# Patient Record
Sex: MALE | Race: WHITE | NOT HISPANIC OR LATINO | Employment: OTHER | URBAN - METROPOLITAN AREA
[De-identification: names, ages, dates, MRNs, and addresses within clinical notes are randomized per-mention and may not be internally consistent; named-entity substitution may affect disease eponyms.]

---

## 2019-02-04 ENCOUNTER — HOSPITAL ENCOUNTER (OUTPATIENT)
Dept: NUCLEAR MEDICINE | Facility: HOSPITAL | Age: 79
Discharge: HOME | End: 2019-02-04
Attending: NURSE PRACTITIONER | Admitting: NURSE PRACTITIONER

## 2019-04-15 ENCOUNTER — CONVERSION ENCOUNTER (OUTPATIENT)
Dept: FAMILY MEDICINE CLINIC | Facility: CLINIC | Age: 79
End: 2019-04-15

## 2019-04-25 ENCOUNTER — HOSPITAL ENCOUNTER (OUTPATIENT)
Dept: LAB | Facility: HOSPITAL | Age: 79
Discharge: HOME | End: 2019-04-25
Attending: INTERNAL MEDICINE | Admitting: INTERNAL MEDICINE

## 2019-04-25 LAB
ANION GAP SERPL CALC-SCNC: 14.6 MMOL/L (ref 10–20)
BASOPHILS # BLD AUTO: 0 10*3/UL (ref 0–0.2)
BASOPHILS NFR BLD AUTO: 1 % (ref 0–2)
BUN SERPL-MCNC: 20 MG/DL (ref 8–20)
BUN/CREAT SERPL: 15.4 (ref 6.2–20.3)
CALCIUM SERPL-MCNC: 9.1 MG/DL (ref 8.9–10.3)
CHLORIDE SERPL-SCNC: 103 MMOL/L (ref 101–111)
CONV CO2: 23 MMOL/L (ref 22–32)
CREAT UR-MCNC: 1.3 MG/DL (ref 0.7–1.2)
DIFFERENTIAL METHOD BLD: (no result)
EOSINOPHIL # BLD AUTO: 0.2 10*3/UL (ref 0–0.3)
EOSINOPHIL # BLD AUTO: 4 % (ref 0–3)
ERYTHROCYTE [DISTWIDTH] IN BLOOD BY AUTOMATED COUNT: 14 % (ref 11.5–14.5)
GLUCOSE SERPL-MCNC: 98 MG/DL (ref 65–99)
HCT VFR BLD AUTO: 42.3 % (ref 40–54)
HGB BLD-MCNC: 14.2 G/DL (ref 14–18)
INR PPP: 1
LYMPHOCYTES # BLD AUTO: 1.7 10*3/UL (ref 0.8–4.8)
LYMPHOCYTES NFR BLD AUTO: 27 % (ref 18–42)
MCH RBC QN AUTO: 30.6 PG (ref 26–32)
MCHC RBC AUTO-ENTMCNC: 33.6 G/DL (ref 32–36)
MCV RBC AUTO: 91.1 FL (ref 80–94)
MONOCYTES # BLD AUTO: 0.7 10*3/UL (ref 0.1–1.3)
MONOCYTES NFR BLD AUTO: 11 % (ref 2–11)
NEUTROPHILS # BLD AUTO: 3.8 10*3/UL (ref 2.3–8.6)
NEUTROPHILS NFR BLD AUTO: 57 % (ref 50–75)
NRBC BLD AUTO-RTO: 0 /100{WBCS}
NRBC/RBC NFR BLD MANUAL: 0 10*3/UL
PLATELET # BLD AUTO: 299 10*3/UL (ref 150–450)
PMV BLD AUTO: 8 FL (ref 7.4–10.4)
POTASSIUM SERPL-SCNC: 4.6 MMOL/L (ref 3.6–5.1)
PROTHROMBIN TIME: 10.3 SEC (ref 9.6–11.7)
RBC # BLD AUTO: 4.64 10*6/UL (ref 4.6–6)
SODIUM SERPL-SCNC: 136 MMOL/L (ref 136–144)
WBC # BLD AUTO: 6.5 10*3/UL (ref 4.5–11.5)

## 2019-06-04 VITALS
HEIGHT: 71 IN | HEART RATE: 61 BPM | BODY MASS INDEX: 29.15 KG/M2 | DIASTOLIC BLOOD PRESSURE: 68 MMHG | SYSTOLIC BLOOD PRESSURE: 119 MMHG | RESPIRATION RATE: 18 BRPM | WEIGHT: 208.2 LBS | OXYGEN SATURATION: 97 %

## 2019-07-15 ENCOUNTER — OFFICE VISIT (OUTPATIENT)
Dept: CARDIOLOGY | Facility: CLINIC | Age: 79
End: 2019-07-15

## 2019-07-15 VITALS
SYSTOLIC BLOOD PRESSURE: 115 MMHG | WEIGHT: 210 LBS | DIASTOLIC BLOOD PRESSURE: 70 MMHG | HEART RATE: 75 BPM | RESPIRATION RATE: 18 BRPM | BODY MASS INDEX: 29.4 KG/M2 | HEIGHT: 71 IN

## 2019-07-15 DIAGNOSIS — E78.5 HYPERLIPIDEMIA LDL GOAL <70: ICD-10-CM

## 2019-07-15 DIAGNOSIS — I25.118 CORONARY ARTERY DISEASE OF NATIVE ARTERY OF NATIVE HEART WITH STABLE ANGINA PECTORIS (HCC): Primary | ICD-10-CM

## 2019-07-15 DIAGNOSIS — I10 ESSENTIAL HYPERTENSION: ICD-10-CM

## 2019-07-15 PROCEDURE — 99214 OFFICE O/P EST MOD 30 MIN: CPT | Performed by: INTERNAL MEDICINE

## 2019-07-15 RX ORDER — RANOLAZINE 500 MG/1
500 TABLET, EXTENDED RELEASE ORAL 2 TIMES DAILY
COMMUNITY
End: 2019-07-15 | Stop reason: SDUPTHER

## 2019-07-15 RX ORDER — ISOSORBIDE MONONITRATE 60 MG/1
30 TABLET, EXTENDED RELEASE ORAL DAILY
COMMUNITY
End: 2023-03-10 | Stop reason: HOSPADM

## 2019-07-15 RX ORDER — METOPROLOL TARTRATE 100 MG/1
50 TABLET ORAL 2 TIMES DAILY
COMMUNITY

## 2019-07-15 RX ORDER — FENOFIBRATE 54 MG/1
54 TABLET ORAL DAILY
COMMUNITY
End: 2020-01-16

## 2019-07-15 RX ORDER — ATORVASTATIN CALCIUM 20 MG/1
20 TABLET, FILM COATED ORAL DAILY
COMMUNITY
End: 2020-01-16

## 2019-07-15 RX ORDER — AMLODIPINE BESYLATE 2.5 MG/1
2.5 TABLET ORAL NIGHTLY
Status: ON HOLD | COMMUNITY
End: 2022-01-27

## 2019-07-15 RX ORDER — RANOLAZINE 500 MG/1
500 TABLET, EXTENDED RELEASE ORAL 2 TIMES DAILY
Qty: 180 TABLET | Refills: 3 | Status: SHIPPED | OUTPATIENT
Start: 2019-07-15 | End: 2020-01-16

## 2019-07-15 NOTE — PROGRESS NOTES
Subjective:     Encounter Date:07/15/2019      Patient ID: Darrick Andrade is a 79 y.o. male.    Chief Complaint:      It is my pleasure seeing patient Darrick Andrade  in the office today.  is a pleasant 79-year-old male patient with past medical history that is significant for history of  hypertension hyperlipidemia, currently on maximal medical therapy including aspirin   statin beta-blocker and Imdur continued to have recurrent episodes of chest discomfort of the new suspicious for anginal equivalent.      he was tried on medical therapy including beta-blocker and isosorbide with the continued symptoms      echocardiogram with normal LV systolic function  Patient cardiac catheterization showed severe 2 vessel coronary artery disease involving LAD and ostium of the right coronary artery   Likely LAD is a culprit lesion for patient's symptoms of angina   Lad lesion is somewhat risky in terms of intervention plan to treat him medically at this time   patient likes to try conservative therapy at this time   continue Ranexa and close monitoring  If the patient continues to be symptomatic will consider high-risk PCI to the LAD   need for close monitoring and follow-up discussed with the patient   cath films reviewed with the patient  Patient denies any further symptoms of chest discomfort with Ranexa  Continue Ranexa and maximal medical therapy for now  Follow-up with primary care physician for labs   follow-up in office in 6 months        The following portions of the patient's history were reviewed and updated as appropriate: allergies, current medications, past family history, past medical history, past social history, past surgical history and problem list.    No Known Allergies      Current Outpatient Medications:   •  amLODIPine (NORVASC) 2.5 MG tablet, Take 2.5 mg by mouth Daily., Disp: , Rfl:   •  atorvastatin (LIPITOR) 20 MG tablet, Take 20 mg by mouth Daily., Disp: , Rfl:   •  fenofibrate (TRICOR) 54  MG tablet, Take 54 mg by mouth Daily., Disp: , Rfl:   •  isosorbide mononitrate (IMDUR) 60 MG 24 hr tablet, Take 60 mg by mouth Daily., Disp: , Rfl:   •  metoprolol tartrate (LOPRESSOR) 100 MG tablet, Take 100 mg by mouth 2 (Two) Times a Day., Disp: , Rfl:   •  ranolazine (RANEXA) 500 MG 12 hr tablet, Take 500 mg by mouth 2 (Two) Times a Day., Disp: , Rfl:     Family History   Problem Relation Age of Onset   • Hyperlipidemia Mother    • Hyperlipidemia Father    • Brain cancer Father        Past Medical History:   Diagnosis Date   • Glaucoma    • Hyperlipidemia    • Hypertension    • Skin cancer        Past Surgical History:   Procedure Laterality Date   • CARDIAC CATHETERIZATION  04/26/2019    No stents placed   • HERNIA REPAIR     • OTHER SURGICAL HISTORY      lump on back of head removed   • TOE SURGERY         Social History     Socioeconomic History   • Marital status:      Spouse name: Not on file   • Number of children: Not on file   • Years of education: Not on file   • Highest education level: Not on file   Tobacco Use   • Smoking status: Former Smoker   • Smokeless tobacco: Never Used   Substance and Sexual Activity   • Alcohol use: No     Frequency: Never   • Drug use: No       Review of Systems   Constitution: Negative for chills, decreased appetite and malaise/fatigue.   HENT: Negative for congestion.    Eyes: Negative for blurred vision and double vision.   Cardiovascular: Negative for chest pain, dyspnea on exertion, leg swelling, near-syncope, orthopnea, palpitations and syncope.   Respiratory: Negative for cough and shortness of breath.    Hematologic/Lymphatic: Negative for adenopathy. Does not bruise/bleed easily.   Skin: Negative for rash.   Gastrointestinal: Negative for bloating and abdominal pain.   Neurological: Negative for dizziness and focal weakness.            Objective:         Vitals:    07/15/19 1412   BP: 115/70   Pulse: 75   Resp: 18       Physical Exam   Constitutional: He is  oriented to person, place, and time. He appears well-developed and well-nourished.   HENT:   Head: Normocephalic and atraumatic.   Eyes: Conjunctivae are normal. Pupils are equal, round, and reactive to light.   Neck: Normal range of motion. Neck supple. No thyromegaly present.   Cardiovascular: Normal rate, regular rhythm, S1 normal, S2 normal and intact distal pulses.   Pulmonary/Chest: Effort normal and breath sounds normal.   Abdominal: Soft. Bowel sounds are normal.   Musculoskeletal: He exhibits no edema.   Neurological: He is alert and oriented to person, place, and time.   Skin: Skin is warm.   Nursing note and vitals reviewed.

## 2019-07-26 ENCOUNTER — TELEPHONE (OUTPATIENT)
Dept: CARDIOLOGY | Facility: CLINIC | Age: 79
End: 2019-07-26

## 2019-07-26 RX ORDER — RANOLAZINE 500 MG/1
500 TABLET, EXTENDED RELEASE ORAL 2 TIMES DAILY
Qty: 60 TABLET | Refills: 6 | Status: SHIPPED | OUTPATIENT
Start: 2019-07-26 | End: 2020-07-13

## 2020-01-16 ENCOUNTER — OFFICE VISIT (OUTPATIENT)
Dept: CARDIOLOGY | Facility: CLINIC | Age: 80
End: 2020-01-16

## 2020-01-16 VITALS
BODY MASS INDEX: 29.06 KG/M2 | DIASTOLIC BLOOD PRESSURE: 67 MMHG | RESPIRATION RATE: 18 BRPM | SYSTOLIC BLOOD PRESSURE: 127 MMHG | HEART RATE: 66 BPM | HEIGHT: 71 IN | WEIGHT: 207.6 LBS

## 2020-01-16 DIAGNOSIS — E78.2 MIXED HYPERLIPIDEMIA: ICD-10-CM

## 2020-01-16 DIAGNOSIS — I10 ESSENTIAL HYPERTENSION: ICD-10-CM

## 2020-01-16 DIAGNOSIS — I25.118 CORONARY ARTERY DISEASE OF NATIVE ARTERY OF NATIVE HEART WITH STABLE ANGINA PECTORIS (HCC): ICD-10-CM

## 2020-01-16 DIAGNOSIS — I20.9 ANGINA PECTORIS (HCC): Primary | ICD-10-CM

## 2020-01-16 PROCEDURE — 93000 ELECTROCARDIOGRAM COMPLETE: CPT | Performed by: INTERNAL MEDICINE

## 2020-01-16 PROCEDURE — 99214 OFFICE O/P EST MOD 30 MIN: CPT | Performed by: INTERNAL MEDICINE

## 2020-01-16 RX ORDER — MOXIFLOXACIN 5 MG/ML
SOLUTION/ DROPS OPHTHALMIC
COMMUNITY
Start: 2020-01-15 | End: 2020-05-09

## 2020-01-16 RX ORDER — LEVOTHYROXINE SODIUM 0.05 MG/1
50 TABLET ORAL DAILY
COMMUNITY

## 2020-01-16 RX ORDER — ASPIRIN 325 MG
325 TABLET ORAL DAILY
COMMUNITY
End: 2020-05-10 | Stop reason: HOSPADM

## 2020-01-16 RX ORDER — ROSUVASTATIN CALCIUM 20 MG/1
20 TABLET, COATED ORAL NIGHTLY
COMMUNITY
Start: 2020-01-14

## 2020-01-16 RX ORDER — OLMESARTAN MEDOXOMIL 40 MG/1
40 TABLET ORAL DAILY
COMMUNITY
Start: 2020-01-05 | End: 2020-05-09

## 2020-01-16 NOTE — PROGRESS NOTES
Cardiology Office Visit      Encounter Date:  01/16/2020    Patient ID:   Darrick Andrade is a 79 y.o. male.    Reason For Followup:  Coronary artery disease  Stable angina  Hypertension  Hyperlipidemia    Brief Clinical History:  Dear Emily Etienne APRN    I had the pleasure of seeing Darrick Andrade today. As you are well aware, this is a 79 y.o. male with past medical history that is significant for history of  hypertension hyperlipidemia, currently on maximal medical therapy including aspirin statin beta-blocker and Imdur continued to have recurrent episodes of chest discomfort of the new suspicious for anginal equivalent.     Echocardiogram with normal LV systolic function  Patient cardiac catheterization showed severe 2 vessel coronary artery disease involving LAD and ostium of the right coronary artery/ Likely LAD is a culprit lesion for patient's symptoms of angina    Interval History:  Patient denies any further symptoms of chest discomfort  Able to exercise without any significant limitation    Assessment & Plan    Impressions:  Coronary artery disease  Stable angina  Hypertension  Hyperlipidemia    Recommendations:  Lad lesion is somewhat risky in terms of intervention plan to treat him medically at this time   patient likes to try conservative therapy at this time   continue Ranexa and close monitoring  If the patient continues to be symptomatic will consider high-risk PCI to the LAD   need for close monitoring and follow-up discussed with the patient   cath films reviewed with the patient  Patient denies any further symptoms of chest discomfort with Ranexa  Continue Ranexa and maximal medical therapy for now  Patient brought labs with the LDL still around 100 recent increase in the dose of statin  Follow-up with primary care physician for labs  Follow-up in office in 6 months    Objective:    Vitals:  Vitals:    01/16/20 1320   BP: 127/67   BP Location: Left arm   Pulse: 66   Resp: 18  "  Weight: 94.2 kg (207 lb 9.6 oz)   Height: 179.1 cm (70.5\")       Physical Exam:    General: Alert, cooperative, no distress, appears stated age  Head:  Normocephalic, atraumatic, mucous membranes moist  Eyes:  Conjunctiva/corneas clear, EOM's intact     Neck:  Supple,  no adenopathy;      Lungs: Clear to auscultation bilaterally, no wheezes rhonchi rales are noted  Chest wall: No tenderness  Heart::  Regular rate and rhythm, S1 and S2 normal, no murmur, rub or gallop  Abdomen: Soft, non-tender, nondistended bowel sounds active  Extremities: No cyanosis, clubbing, or edema  Pulses: 2+ and symmetric all extremities  Skin:  No rashes or lesions  Neuro/psych: A&O x3. CN II through XII are grossly intact with appropriate affect      Allergies:  No Known Allergies    Medication Review:     Current Outpatient Medications:   •  amLODIPine (NORVASC) 2.5 MG tablet, Take 2.5 mg by mouth Daily., Disp: , Rfl:   •  aspirin 325 MG tablet, Take 325 mg by mouth Daily., Disp: , Rfl:   •  DULoxetine (CYMBALTA) 30 MG capsule, Take 30 mg by mouth Daily., Disp: , Rfl:   •  isosorbide mononitrate (IMDUR) 60 MG 24 hr tablet, Take 60 mg by mouth Daily., Disp: , Rfl:   •  levothyroxine (SYNTHROID, LEVOTHROID) 50 MCG tablet, Take 50 mcg by mouth Daily., Disp: , Rfl:   •  metoprolol tartrate (LOPRESSOR) 100 MG tablet, Take 100 mg by mouth 2 (Two) Times a Day., Disp: , Rfl:   •  moxifloxacin (VIGAMOX) 0.5 % ophthalmic solution, , Disp: , Rfl:   •  olmesartan (BENICAR) 40 MG tablet, Take 40 mg by mouth Daily. 1/2 tablet po at night, Disp: , Rfl:   •  ranolazine (RANEXA) 500 MG 12 hr tablet, Take 1 tablet by mouth 2 (Two) Times a Day., Disp: 60 tablet, Rfl: 6  •  rosuvastatin (CRESTOR) 20 MG tablet, Take 20 mg by mouth Every Night., Disp: , Rfl:     Family History:  Family History   Problem Relation Age of Onset   • Hyperlipidemia Mother    • Hyperlipidemia Father    • Brain cancer Father        Past Medical History:  Past Medical History: "   Diagnosis Date   • Coronary artery disease of native artery of native heart with stable angina pectoris (CMS/[facility]) 7/15/2019   • Essential hypertension 7/15/2019   • Glaucoma    • Hyperlipidemia    • Hyperlipidemia LDL goal <70 7/15/2019   • Hypertension    • Hypothyroidism    • Skin cancer        Past surgical History:  Past Surgical History:   Procedure Laterality Date   • CARDIAC CATHETERIZATION  04/26/2019    No stents placed   • HERNIA REPAIR     • OTHER SURGICAL HISTORY      lump on back of head removed   • TOE SURGERY         Social History:  Social History     Socioeconomic History   • Marital status:      Spouse name: Not on file   • Number of children: Not on file   • Years of education: Not on file   • Highest education level: Not on file   Tobacco Use   • Smoking status: Former Smoker   • Smokeless tobacco: Never Used   Substance and Sexual Activity   • Alcohol use: No     Frequency: Never   • Drug use: No   • Sexual activity: Defer       Review of Systems:  The following systems were reviewed as they relate to the cardiovascular system: Constitutional, Eyes, ENT, Cardiovascular, Respiratory, Gastrointestinal, Integumentary, Neurological, Psychiatric, Hematologic, Endocrine, Musculoskeletal, and Genitourinary. The pertinent cardiovascular findings are reported above with all other cardiovascular points within those systems being negative.    Diagnostic Study Review:     Current Electrocardiogram:    ECG 12 Lead  Date/Time: 1/16/2020 5:27 PM  Performed by: Hien Parisi MD  Authorized by: Hien Parisi MD   Comparison: compared with previous ECG   Similar to previous ECG  Rhythm: sinus rhythm  Rate: normal  BPM: 66  Conduction: conduction normal  T inversion: III and aVF  QRS axis: normal  Other findings: non-specific ST-T wave changes and left ventricular hypertrophy    Clinical impression: abnormal EKG              NOTE: The following portions of the patient's history were  reviewed and updated this visit as appropriate: allergies, current medications, past family history, past medical history, past social history, past surgical history and problem list.

## 2020-05-09 ENCOUNTER — APPOINTMENT (OUTPATIENT)
Dept: CT IMAGING | Facility: HOSPITAL | Age: 80
End: 2020-05-09

## 2020-05-09 ENCOUNTER — HOSPITAL ENCOUNTER (OUTPATIENT)
Facility: HOSPITAL | Age: 80
Discharge: HOME | End: 2020-05-10
Attending: INTERNAL MEDICINE | Admitting: INTERNAL MEDICINE

## 2020-05-09 DIAGNOSIS — R10.30 LOWER ABDOMINAL PAIN: ICD-10-CM

## 2020-05-09 DIAGNOSIS — K92.2 GASTROINTESTINAL HEMORRHAGE, UNSPECIFIED GASTROINTESTINAL HEMORRHAGE TYPE: Primary | ICD-10-CM

## 2020-05-09 DIAGNOSIS — K92.1 GASTROINTESTINAL HEMORRHAGE WITH MELENA: ICD-10-CM

## 2020-05-09 PROBLEM — I20.9 ANGINA PECTORIS: Status: ACTIVE | Noted: 2019-04-15

## 2020-05-09 PROBLEM — E78.5 HYPERLIPIDEMIA LDL GOAL <70: Chronic | Status: ACTIVE | Noted: 2019-07-15

## 2020-05-09 PROBLEM — I25.10 CORONARY ARTERY DISEASE: Status: ACTIVE | Noted: 2019-05-13

## 2020-05-09 PROBLEM — I10 ESSENTIAL HYPERTENSION: Chronic | Status: ACTIVE | Noted: 2019-07-15

## 2020-05-09 PROBLEM — R07.9 CHEST PAIN: Status: ACTIVE | Noted: 2019-04-16

## 2020-05-09 PROBLEM — I20.9 ANGINA PECTORIS: Chronic | Status: ACTIVE | Noted: 2019-04-15

## 2020-05-09 PROBLEM — I25.118 CORONARY ARTERY DISEASE OF NATIVE ARTERY OF NATIVE HEART WITH STABLE ANGINA PECTORIS: Chronic | Status: ACTIVE | Noted: 2019-07-15

## 2020-05-09 LAB
ABO GROUP BLD: NORMAL
ALBUMIN SERPL-MCNC: 4.2 G/DL (ref 3.5–5.2)
ALBUMIN/GLOB SERPL: 1.6 G/DL
ALP SERPL-CCNC: 36 U/L (ref 39–117)
ALT SERPL W P-5'-P-CCNC: 13 U/L (ref 1–41)
ANION GAP SERPL CALCULATED.3IONS-SCNC: 10 MMOL/L (ref 5–15)
AST SERPL-CCNC: 17 U/L (ref 1–40)
BASOPHILS # BLD AUTO: 0 10*3/MM3 (ref 0–0.2)
BASOPHILS NFR BLD AUTO: 0.4 % (ref 0–1.5)
BILIRUB SERPL-MCNC: 0.2 MG/DL (ref 0.2–1.2)
BILIRUB UR QL STRIP: NEGATIVE
BLD GP AB SCN SERPL QL: NEGATIVE
BUN BLD-MCNC: 32 MG/DL (ref 8–23)
BUN/CREAT SERPL: 37.2 (ref 7–25)
CALCIUM SPEC-SCNC: 8.5 MG/DL (ref 8.6–10.5)
CHLORIDE SERPL-SCNC: 100 MMOL/L (ref 98–107)
CLARITY UR: CLEAR
CO2 SERPL-SCNC: 25 MMOL/L (ref 22–29)
COLOR UR: YELLOW
CREAT BLD-MCNC: 0.86 MG/DL (ref 0.76–1.27)
DEPRECATED RDW RBC AUTO: 45.1 FL (ref 37–54)
EOSINOPHIL # BLD AUTO: 0.1 10*3/MM3 (ref 0–0.4)
EOSINOPHIL NFR BLD AUTO: 1.4 % (ref 0.3–6.2)
ERYTHROCYTE [DISTWIDTH] IN BLOOD BY AUTOMATED COUNT: 14 % (ref 12.3–15.4)
GFR SERPL CREATININE-BSD FRML MDRD: 86 ML/MIN/1.73
GLOBULIN UR ELPH-MCNC: 2.7 GM/DL
GLUCOSE BLD-MCNC: 144 MG/DL (ref 65–99)
GLUCOSE UR STRIP-MCNC: NEGATIVE MG/DL
HCT VFR BLD AUTO: 24.7 % (ref 37.5–51)
HGB BLD-MCNC: 8.7 G/DL (ref 13–17.7)
HGB UR QL STRIP.AUTO: NEGATIVE
HOLD SPECIMEN: NORMAL
INR PPP: 1.01 (ref 0.9–1.1)
KETONES UR QL STRIP: NEGATIVE
LEUKOCYTE ESTERASE UR QL STRIP.AUTO: NEGATIVE
LIPASE SERPL-CCNC: 42 U/L (ref 13–60)
LYMPHOCYTES # BLD AUTO: 1.6 10*3/MM3 (ref 0.7–3.1)
LYMPHOCYTES NFR BLD AUTO: 18.9 % (ref 19.6–45.3)
MCH RBC QN AUTO: 32.1 PG (ref 26.6–33)
MCHC RBC AUTO-ENTMCNC: 35.3 G/DL (ref 31.5–35.7)
MCV RBC AUTO: 90.9 FL (ref 79–97)
MONOCYTES # BLD AUTO: 0.7 10*3/MM3 (ref 0.1–0.9)
MONOCYTES NFR BLD AUTO: 7.6 % (ref 5–12)
NEUTROPHILS # BLD AUTO: 6.2 10*3/MM3 (ref 1.7–7)
NEUTROPHILS NFR BLD AUTO: 71.7 % (ref 42.7–76)
NITRITE UR QL STRIP: NEGATIVE
NRBC BLD AUTO-RTO: 0 /100 WBC (ref 0–0.2)
PH UR STRIP.AUTO: 6 [PH] (ref 5–8)
PLATELET # BLD AUTO: 232 10*3/MM3 (ref 140–450)
PMV BLD AUTO: 7.3 FL (ref 6–12)
POTASSIUM BLD-SCNC: 4.2 MMOL/L (ref 3.5–5.2)
PROT SERPL-MCNC: 6.9 G/DL (ref 6–8.5)
PROT UR QL STRIP: NEGATIVE
PROTHROMBIN TIME: 10.6 SECONDS (ref 9.6–11.7)
RBC # BLD AUTO: 2.72 10*6/MM3 (ref 4.14–5.8)
RH BLD: POSITIVE
SODIUM BLD-SCNC: 135 MMOL/L (ref 136–145)
SP GR UR STRIP: 1.02 (ref 1–1.03)
T&S EXPIRATION DATE: NORMAL
UROBILINOGEN UR QL STRIP: NORMAL
WBC NRBC COR # BLD: 8.7 10*3/MM3 (ref 3.4–10.8)
WHOLE BLOOD HOLD SPECIMEN: NORMAL
WHOLE BLOOD HOLD SPECIMEN: NORMAL

## 2020-05-09 PROCEDURE — 0 IOPAMIDOL PER 1 ML: Performed by: PHYSICIAN ASSISTANT

## 2020-05-09 PROCEDURE — G0378 HOSPITAL OBSERVATION PER HR: HCPCS

## 2020-05-09 PROCEDURE — 74177 CT ABD & PELVIS W/CONTRAST: CPT

## 2020-05-09 PROCEDURE — 86900 BLOOD TYPING SEROLOGIC ABO: CPT

## 2020-05-09 PROCEDURE — 96374 THER/PROPH/DIAG INJ IV PUSH: CPT

## 2020-05-09 PROCEDURE — 85025 COMPLETE CBC W/AUTO DIFF WBC: CPT | Performed by: PHYSICIAN ASSISTANT

## 2020-05-09 PROCEDURE — 99284 EMERGENCY DEPT VISIT MOD MDM: CPT

## 2020-05-09 PROCEDURE — 99219 PR INITIAL OBSERVATION CARE/DAY 50 MINUTES: CPT | Performed by: STUDENT IN AN ORGANIZED HEALTH CARE EDUCATION/TRAINING PROGRAM

## 2020-05-09 PROCEDURE — 85610 PROTHROMBIN TIME: CPT | Performed by: PHYSICIAN ASSISTANT

## 2020-05-09 PROCEDURE — 81003 URINALYSIS AUTO W/O SCOPE: CPT | Performed by: PHYSICIAN ASSISTANT

## 2020-05-09 PROCEDURE — 80053 COMPREHEN METABOLIC PANEL: CPT | Performed by: PHYSICIAN ASSISTANT

## 2020-05-09 PROCEDURE — 86850 RBC ANTIBODY SCREEN: CPT | Performed by: PHYSICIAN ASSISTANT

## 2020-05-09 PROCEDURE — 86901 BLOOD TYPING SEROLOGIC RH(D): CPT

## 2020-05-09 PROCEDURE — 86901 BLOOD TYPING SEROLOGIC RH(D): CPT | Performed by: PHYSICIAN ASSISTANT

## 2020-05-09 PROCEDURE — 93005 ELECTROCARDIOGRAM TRACING: CPT | Performed by: PHYSICIAN ASSISTANT

## 2020-05-09 PROCEDURE — 83690 ASSAY OF LIPASE: CPT | Performed by: STUDENT IN AN ORGANIZED HEALTH CARE EDUCATION/TRAINING PROGRAM

## 2020-05-09 PROCEDURE — 96361 HYDRATE IV INFUSION ADD-ON: CPT

## 2020-05-09 PROCEDURE — 86900 BLOOD TYPING SEROLOGIC ABO: CPT | Performed by: PHYSICIAN ASSISTANT

## 2020-05-09 RX ORDER — POTASSIUM CHLORIDE 20 MEQ/1
40 TABLET, EXTENDED RELEASE ORAL AS NEEDED
Status: DISCONTINUED | OUTPATIENT
Start: 2020-05-09 | End: 2020-05-10 | Stop reason: HOSPADM

## 2020-05-09 RX ORDER — ACETAMINOPHEN 325 MG/1
650 TABLET ORAL EVERY 4 HOURS PRN
Status: DISCONTINUED | OUTPATIENT
Start: 2020-05-09 | End: 2020-05-10 | Stop reason: HOSPADM

## 2020-05-09 RX ORDER — MAGNESIUM SULFATE HEPTAHYDRATE 40 MG/ML
2 INJECTION, SOLUTION INTRAVENOUS AS NEEDED
Status: DISCONTINUED | OUTPATIENT
Start: 2020-05-09 | End: 2020-05-10 | Stop reason: HOSPADM

## 2020-05-09 RX ORDER — LOSARTAN POTASSIUM 25 MG/1
12.5 TABLET ORAL NIGHTLY
COMMUNITY
End: 2023-03-10 | Stop reason: HOSPADM

## 2020-05-09 RX ORDER — SODIUM CHLORIDE 0.9 % (FLUSH) 0.9 %
10 SYRINGE (ML) INJECTION AS NEEDED
Status: DISCONTINUED | OUTPATIENT
Start: 2020-05-09 | End: 2020-05-10 | Stop reason: HOSPADM

## 2020-05-09 RX ORDER — PANTOPRAZOLE SODIUM 40 MG/10ML
80 INJECTION, POWDER, LYOPHILIZED, FOR SOLUTION INTRAVENOUS ONCE
Status: COMPLETED | OUTPATIENT
Start: 2020-05-09 | End: 2020-05-09

## 2020-05-09 RX ORDER — CHOLECALCIFEROL (VITAMIN D3) 125 MCG
5 CAPSULE ORAL NIGHTLY PRN
Status: DISCONTINUED | OUTPATIENT
Start: 2020-05-09 | End: 2020-05-10 | Stop reason: HOSPADM

## 2020-05-09 RX ORDER — BISACODYL 10 MG
10 SUPPOSITORY, RECTAL RECTAL DAILY PRN
Status: DISCONTINUED | OUTPATIENT
Start: 2020-05-09 | End: 2020-05-10 | Stop reason: HOSPADM

## 2020-05-09 RX ORDER — ONDANSETRON 4 MG/1
4 TABLET, FILM COATED ORAL EVERY 6 HOURS PRN
Status: DISCONTINUED | OUTPATIENT
Start: 2020-05-09 | End: 2020-05-10 | Stop reason: HOSPADM

## 2020-05-09 RX ORDER — METOPROLOL TARTRATE 50 MG/1
100 TABLET, FILM COATED ORAL 2 TIMES DAILY
Status: DISCONTINUED | OUTPATIENT
Start: 2020-05-09 | End: 2020-05-10 | Stop reason: HOSPADM

## 2020-05-09 RX ORDER — ACETAMINOPHEN 160 MG/5ML
650 SOLUTION ORAL EVERY 4 HOURS PRN
Status: DISCONTINUED | OUTPATIENT
Start: 2020-05-09 | End: 2020-05-10 | Stop reason: HOSPADM

## 2020-05-09 RX ORDER — RANOLAZINE 500 MG/1
500 TABLET, EXTENDED RELEASE ORAL 2 TIMES DAILY
Status: DISCONTINUED | OUTPATIENT
Start: 2020-05-09 | End: 2020-05-10 | Stop reason: HOSPADM

## 2020-05-09 RX ORDER — SODIUM CHLORIDE 0.9 % (FLUSH) 0.9 %
10 SYRINGE (ML) INJECTION EVERY 12 HOURS SCHEDULED
Status: DISCONTINUED | OUTPATIENT
Start: 2020-05-09 | End: 2020-05-10 | Stop reason: HOSPADM

## 2020-05-09 RX ORDER — SODIUM CHLORIDE 9 MG/ML
100 INJECTION, SOLUTION INTRAVENOUS CONTINUOUS
Status: DISCONTINUED | OUTPATIENT
Start: 2020-05-09 | End: 2020-05-10 | Stop reason: HOSPADM

## 2020-05-09 RX ORDER — NITROGLYCERIN 0.4 MG/1
0.4 TABLET SUBLINGUAL
Status: DISCONTINUED | OUTPATIENT
Start: 2020-05-09 | End: 2020-05-10 | Stop reason: HOSPADM

## 2020-05-09 RX ORDER — ONDANSETRON 2 MG/ML
4 INJECTION INTRAMUSCULAR; INTRAVENOUS EVERY 6 HOURS PRN
Status: DISCONTINUED | OUTPATIENT
Start: 2020-05-09 | End: 2020-05-10 | Stop reason: HOSPADM

## 2020-05-09 RX ORDER — MAGNESIUM SULFATE 1 G/100ML
1 INJECTION INTRAVENOUS AS NEEDED
Status: DISCONTINUED | OUTPATIENT
Start: 2020-05-09 | End: 2020-05-10 | Stop reason: HOSPADM

## 2020-05-09 RX ORDER — ISOSORBIDE MONONITRATE 60 MG/1
60 TABLET, EXTENDED RELEASE ORAL DAILY
Status: DISCONTINUED | OUTPATIENT
Start: 2020-05-10 | End: 2020-05-10 | Stop reason: HOSPADM

## 2020-05-09 RX ORDER — ROSUVASTATIN CALCIUM 10 MG/1
20 TABLET, COATED ORAL NIGHTLY
Status: DISCONTINUED | OUTPATIENT
Start: 2020-05-09 | End: 2020-05-10 | Stop reason: HOSPADM

## 2020-05-09 RX ORDER — LOSARTAN POTASSIUM 25 MG/1
12.5 TABLET ORAL NIGHTLY
Status: DISCONTINUED | OUTPATIENT
Start: 2020-05-09 | End: 2020-05-10 | Stop reason: HOSPADM

## 2020-05-09 RX ORDER — POTASSIUM CHLORIDE 1.5 G/1.77G
40 POWDER, FOR SOLUTION ORAL AS NEEDED
Status: DISCONTINUED | OUTPATIENT
Start: 2020-05-09 | End: 2020-05-10 | Stop reason: HOSPADM

## 2020-05-09 RX ORDER — POTASSIUM CHLORIDE 7.45 MG/ML
10 INJECTION INTRAVENOUS
Status: DISCONTINUED | OUTPATIENT
Start: 2020-05-09 | End: 2020-05-10 | Stop reason: HOSPADM

## 2020-05-09 RX ORDER — DULOXETIN HYDROCHLORIDE 30 MG/1
30 CAPSULE, DELAYED RELEASE ORAL DAILY
Status: DISCONTINUED | OUTPATIENT
Start: 2020-05-10 | End: 2020-05-10 | Stop reason: HOSPADM

## 2020-05-09 RX ORDER — LEVOTHYROXINE SODIUM 0.05 MG/1
50 TABLET ORAL
Status: DISCONTINUED | OUTPATIENT
Start: 2020-05-10 | End: 2020-05-10 | Stop reason: HOSPADM

## 2020-05-09 RX ORDER — PANTOPRAZOLE SODIUM 40 MG/10ML
40 INJECTION, POWDER, LYOPHILIZED, FOR SOLUTION INTRAVENOUS EVERY 12 HOURS SCHEDULED
Status: DISCONTINUED | OUTPATIENT
Start: 2020-05-10 | End: 2020-05-10

## 2020-05-09 RX ORDER — AMLODIPINE BESYLATE 5 MG/1
2.5 TABLET ORAL NIGHTLY
Status: DISCONTINUED | OUTPATIENT
Start: 2020-05-09 | End: 2020-05-10 | Stop reason: HOSPADM

## 2020-05-09 RX ORDER — ACETAMINOPHEN 650 MG/1
650 SUPPOSITORY RECTAL EVERY 4 HOURS PRN
Status: DISCONTINUED | OUTPATIENT
Start: 2020-05-09 | End: 2020-05-10 | Stop reason: HOSPADM

## 2020-05-09 RX ADMIN — RANOLAZINE 500 MG: 500 TABLET, FILM COATED, EXTENDED RELEASE ORAL at 22:22

## 2020-05-09 RX ADMIN — IOPAMIDOL 100 ML: 755 INJECTION, SOLUTION INTRAVENOUS at 20:02

## 2020-05-09 RX ADMIN — METOPROLOL TARTRATE 100 MG: 50 TABLET, FILM COATED ORAL at 22:22

## 2020-05-09 RX ADMIN — Medication 10 ML: at 19:34

## 2020-05-09 RX ADMIN — AMLODIPINE BESYLATE 2.5 MG: 5 TABLET ORAL at 22:22

## 2020-05-09 RX ADMIN — ROSUVASTATIN CALCIUM 20 MG: 10 TABLET, FILM COATED ORAL at 22:22

## 2020-05-09 RX ADMIN — SODIUM CHLORIDE 100 ML/HR: 900 INJECTION, SOLUTION INTRAVENOUS at 22:23

## 2020-05-09 RX ADMIN — SODIUM CHLORIDE 1000 ML: 900 INJECTION, SOLUTION INTRAVENOUS at 19:34

## 2020-05-09 RX ADMIN — PANTOPRAZOLE SODIUM 80 MG: 40 INJECTION, POWDER, FOR SOLUTION INTRAVENOUS at 19:34

## 2020-05-09 RX ADMIN — Medication 10 ML: at 22:23

## 2020-05-09 RX ADMIN — LOSARTAN POTASSIUM 12.5 MG: 25 TABLET, FILM COATED ORAL at 22:22

## 2020-05-09 NOTE — ED NOTES
Pt c/o black stools, abd discomfort x1 wk with mild dizziness x2 days.     Sussy Monterroso, RN  05/09/20 8889

## 2020-05-09 NOTE — ED PROVIDER NOTES
"Subjective   History of Present Illness  Patient is a 80-year-old male [facility] significant for hypertension hyperlipidemia CAD glaucoma who presents with complaints of black stools for the past week.  Patient denies any diarrhea.  Does report some intermittent lower abdominal discomfort that he rates at 3/10 in severity.  Denies any radiation of the pain from his abdomen.  Patient reports some slight \"loss of balance type dizziness\" but states this is been chronic for several years and states his primary care provider thinks it is due to his high blood pressure has any recent falls,injuries, or syncopal episodes.  Patient also denies any chest pain shortness of breath urinary symptoms include dysuria hematuria recent travel or recent antibiotic use.  Patient also denies any fever, history of GI bleeds.  Patient states he currently takes aspirin otherwise no other blood thinners.  Patient states he is never had a colonoscopy.   Review of Systems   Constitutional: Negative.    Respiratory: Negative.    Cardiovascular: Negative.    Gastrointestinal: Positive for abdominal pain and blood in stool. Negative for abdominal distention, constipation, diarrhea, nausea and vomiting.   Genitourinary: Negative.    Skin: Negative.    Neurological: Positive for dizziness. Negative for tremors, seizures, syncope, facial asymmetry, speech difficulty, weakness, light-headedness, numbness and headaches.       Past Medical History:   Diagnosis Date   • Coronary artery disease of native artery of native heart with stable angina pectoris (CMS/[facility]) 7/15/2019   • Essential hypertension 7/15/2019   • Glaucoma    • Hyperlipidemia    • Hyperlipidemia LDL goal <70 7/15/2019   • Hypertension    • Hypothyroidism    • Skin cancer        No Known Allergies    Past Surgical History:   Procedure Laterality Date   • CARDIAC CATHETERIZATION  04/26/2019    No stents placed   • HERNIA REPAIR     • OTHER SURGICAL HISTORY      lump on back of head removed   • " TOE SURGERY         Family History   Problem Relation Age of Onset   • Hyperlipidemia Mother    • Hyperlipidemia Father    • Brain cancer Father        Social History     Socioeconomic History   • Marital status:      Spouse name: Not on file   • Number of children: Not on file   • Years of education: Not on file   • Highest education level: Not on file   Tobacco Use   • Smoking status: Former Smoker   • Smokeless tobacco: Never Used   Substance and Sexual Activity   • Alcohol use: No     Frequency: Never   • Drug use: No   • Sexual activity: Defer           Objective   Physical Exam   Constitutional: He is oriented to person, place, and time. He appears well-developed and well-nourished. No distress.   HENT:   Head: Normocephalic and atraumatic.   Mouth/Throat: No oropharyngeal exudate.   Eyes: Pupils are equal, round, and reactive to light. EOM are normal. No scleral icterus.   Cardiovascular: Normal rate, regular rhythm and normal heart sounds. Exam reveals no gallop and no friction rub.   No murmur heard.  Pulmonary/Chest: Effort normal and breath sounds normal. No stridor. No respiratory distress. He has no wheezes. He has no rales.   Abdominal: Soft. Normal appearance and bowel sounds are normal. He exhibits no distension, no fluid wave and no mass. There is tenderness in the right lower quadrant, suprapubic area and left lower quadrant. There is no rigidity, no rebound, no guarding, no CVA tenderness, no tenderness at McBurney's point and negative Crump's sign.   Neurological: He is alert and oriented to person, place, and time. No cranial nerve deficit or sensory deficit.   Skin: Skin is warm. Capillary refill takes less than 2 seconds. No rash noted. He is not diaphoretic. No erythema. No pallor.   Psychiatric: He has a normal mood and affect. His behavior is normal.   Nursing note and vitals reviewed.      Procedures           ED Course    /68 (BP Location: Left arm, Patient Position:  "Sitting)   Pulse 66   Temp 97.3 °F (36.3 °C) (Oral)   Resp 20   Ht 177.8 cm (70\")   Wt 95.8 kg (211 lb 3.2 oz)   SpO2 97%   BMI 30.30 kg/m²   Medications   sodium chloride 0.9 % flush 10 mL (10 mL Intravenous Given 5/9/20 1934)   sodium chloride 0.9 % bolus 1,000 mL (1,000 mL Intravenous New Bag 5/9/20 1934)   pantoprazole (PROTONIX) injection 80 mg (80 mg Intravenous Given 5/9/20 1934)   iopamidol (ISOVUE-370) 76 % injection 100 mL (100 mL Intravenous Given 5/9/20 2002)     Labs Reviewed   COMPREHENSIVE METABOLIC PANEL - Abnormal; Notable for the following components:       Result Value    Glucose 144 (*)     BUN 32 (*)     Sodium 135 (*)     Calcium 8.5 (*)     Alkaline Phosphatase 36 (*)     BUN/Creatinine Ratio 37.2 (*)     All other components within normal limits    Narrative:     GFR Normal >60  Chronic Kidney Disease <60  Kidney Failure <15     CBC WITH AUTO DIFFERENTIAL - Abnormal; Notable for the following components:    RBC 2.72 (*)     Hemoglobin 8.7 (*)     Hematocrit 24.7 (*)     Lymphocyte % 18.9 (*)     All other components within normal limits   PROTIME-INR - Normal   RAINBOW DRAW    Narrative:     The following orders were created for panel order [place] Draw.  Procedure                               Abnormality         Status                     ---------                               -----------         ------                     Light Blue Top[[id number]]                                   Final result               Green Top (Gel)[[id number]]                                  Final result               Lavender Top[[id number]]                                     Final result               Gold Top - SST[[id number]]                                   In process                   Please view results for these tests on the individual orders.   URINALYSIS W/ MICROSCOPIC IF INDICATED (NO CULTURE)   TYPE AND SCREEN   BB ARMBAND CHECK   CBC AND DIFFERENTIAL    Narrative:     The following orders were " created for panel order CBC & Differential.  Procedure                               Abnormality         Status                     ---------                               -----------         ------                     CBC Auto Differential[[id number]]        Abnormal            Final result                 Please view results for these tests on the individual orders.   LIGHT BLUE TOP   GREEN TOP   LAVENDER TOP   GOLD TOP - SST   EXTRA TUBES    Narrative:     The following orders were created for panel order Extra Tubes.  Procedure                               Abnormality         Status                     ---------                               -----------         ------                     Green Top (Gel)[[id number]]                                  In process                   Please view results for these tests on the individual orders.   GREEN TOP     Ct Abdomen Pelvis With Contrast    Result Date: 5/9/2020  Impression: 1. Active gastrointestinal bleeding is not identified on this study. 2. Borderline cardiomegaly with coronary artery calcifications. 3. Diverticulosis. 4. Moderate constipation. 5. Small fat-containing right-sided periumbilical hernia. Slot 63 Electronically signed by:  Jaguar West M.D.  5/9/2020 6:18 PM                                           MDM  Number of Diagnoses or Management Options  Gastrointestinal hemorrhage, unspecified gastrointestinal hemorrhage type:   Lower abdominal pain:   Diagnosis management comments: Chart Review:  Comorbidity: Hypertension hyperlipidemia CAD glaucoma hypothyroidism  Differentials: Upper or lower GI bleed, intra-abdominal infection, dissection, peptic ulcer disease,  ischemic bowel, bowel obstruction;this list is not all inclusive and does not constitute the entirety of considered causes  ECG: Turbid by myself and Dr. Strange shows sinus rhythm rate 63 with borderline prolonged ME interval previous EKG was reviewed from 4/25/2019  Labs: CBC shows WBC 8.7  hemoglobin 8.7 hematocrit 24.7 platelets 232.  CMP shows glucose 144 BUN 32 creatinine 0.86 sodium 135 potassium 4.2.  Pro time 10.6 INR 1.01.  Type and screen as described above.  Urinalysis pending upon admission  Imaging: Was interpreted by physician and reviewed by myself:  Ct Abdomen Pelvis With Contrast  Result Date: 5/9/2020  Impression: 1. Active gastrointestinal bleeding is not identified on this study. 2. Borderline cardiomegaly with coronary artery calcifications. 3. Diverticulosis. 4. Moderate constipation. 5. Small fat-containing right-sided periumbilical hernia. Slot 63 Electronically signed by:  Jaguar West M.D.  5/9/2020 6:18 PM    Disposition/Treatment:  Appropriate PPE was worn during exam and throughout all encounters with the patient.  While in the ED IV was placed and labs were obtained.  Patient was given fluids and Protonix patient's bedside Hemoccult was positive patient was given fluids and Protonix.  Patient was not hypotensive upon arrival to the ED was also afebrile and appeared nontoxic.  Patient was placed on monitor lab results as described above hemoglobin 8.7 hematocrit 24.7 CT of abdomen and pelvis as described above.  Lab results and findings were discussed with the patient and family via telephone per his request voiced understanding admission through hospitalist group.  Spoke to Tangela QUIGLEY for Dr. Ozuna who agreed for admission.  GI was also consulted spoke to Dr. Esparza who agreed to see the patient in the morning.  Patient remained stable throughout ED stay.       Amount and/or Complexity of Data Reviewed  Clinical lab tests: reviewed  Tests in the radiology section of CPT®: reviewed  Tests in the medicine section of CPT®: reviewed        Final diagnoses:   Gastrointestinal hemorrhage, unspecified gastrointestinal hemorrhage type   Lower abdominal pain            Radha Haynes PA  05/09/20 2032

## 2020-05-10 ENCOUNTER — ON CAMPUS - OUTPATIENT (OUTPATIENT)
Dept: URBAN - METROPOLITAN AREA HOSPITAL 85 | Facility: HOSPITAL | Age: 80
End: 2020-05-10

## 2020-05-10 ENCOUNTER — ANESTHESIA EVENT (OUTPATIENT)
Dept: GASTROENTEROLOGY | Facility: HOSPITAL | Age: 80
End: 2020-05-10

## 2020-05-10 ENCOUNTER — ANESTHESIA (OUTPATIENT)
Dept: GASTROENTEROLOGY | Facility: HOSPITAL | Age: 80
End: 2020-05-10

## 2020-05-10 VITALS — HEART RATE: 65 BPM | OXYGEN SATURATION: 100 % | SYSTOLIC BLOOD PRESSURE: 87 MMHG | DIASTOLIC BLOOD PRESSURE: 51 MMHG

## 2020-05-10 VITALS
TEMPERATURE: 97.4 F | WEIGHT: 207.67 LBS | HEIGHT: 70 IN | RESPIRATION RATE: 13 BRPM | DIASTOLIC BLOOD PRESSURE: 66 MMHG | OXYGEN SATURATION: 92 % | SYSTOLIC BLOOD PRESSURE: 106 MMHG | HEART RATE: 62 BPM | BODY MASS INDEX: 29.73 KG/M2

## 2020-05-10 DIAGNOSIS — K25.9 GASTRIC ULCER, UNSPECIFIED AS ACUTE OR CHRONIC, WITHOUT HEMO: ICD-10-CM

## 2020-05-10 DIAGNOSIS — K59.00 CONSTIPATION, UNSPECIFIED: ICD-10-CM

## 2020-05-10 DIAGNOSIS — K57.90 DIVERTICULOSIS OF INTESTINE, PART UNSPECIFIED, WITHOUT PERFO: ICD-10-CM

## 2020-05-10 DIAGNOSIS — K44.9 DIAPHRAGMATIC HERNIA WITHOUT OBSTRUCTION OR GANGRENE: ICD-10-CM

## 2020-05-10 DIAGNOSIS — K92.1 MELENA: ICD-10-CM

## 2020-05-10 DIAGNOSIS — K22.2 ESOPHAGEAL OBSTRUCTION: ICD-10-CM

## 2020-05-10 LAB
ANION GAP SERPL CALCULATED.3IONS-SCNC: 8 MMOL/L (ref 5–15)
BASOPHILS # BLD AUTO: 0 10*3/MM3 (ref 0–0.2)
BASOPHILS NFR BLD AUTO: 0.3 % (ref 0–1.5)
BUN BLD-MCNC: 20 MG/DL (ref 8–23)
BUN/CREAT SERPL: 25 (ref 7–25)
CALCIUM SPEC-SCNC: 8.5 MG/DL (ref 8.6–10.5)
CHLORIDE SERPL-SCNC: 106 MMOL/L (ref 98–107)
CO2 SERPL-SCNC: 26 MMOL/L (ref 22–29)
CREAT BLD-MCNC: 0.8 MG/DL (ref 0.76–1.27)
DEPRECATED RDW RBC AUTO: 45.1 FL (ref 37–54)
EOSINOPHIL # BLD AUTO: 0.1 10*3/MM3 (ref 0–0.4)
EOSINOPHIL NFR BLD AUTO: 2.1 % (ref 0.3–6.2)
ERYTHROCYTE [DISTWIDTH] IN BLOOD BY AUTOMATED COUNT: 13.9 % (ref 12.3–15.4)
GFR SERPL CREATININE-BSD FRML MDRD: 93 ML/MIN/1.73
GLUCOSE BLD-MCNC: 102 MG/DL (ref 65–99)
HCT VFR BLD AUTO: 24.3 % (ref 37.5–51)
HGB BLD-MCNC: 8.3 G/DL (ref 13–17.7)
HGB BLD-MCNC: 8.6 G/DL (ref 13–17.7)
LYMPHOCYTES # BLD AUTO: 1.4 10*3/MM3 (ref 0.7–3.1)
LYMPHOCYTES NFR BLD AUTO: 20.4 % (ref 19.6–45.3)
MAGNESIUM SERPL-MCNC: 1.8 MG/DL (ref 1.6–2.4)
MCH RBC QN AUTO: 32.5 PG (ref 26.6–33)
MCHC RBC AUTO-ENTMCNC: 35.6 G/DL (ref 31.5–35.7)
MCV RBC AUTO: 91.2 FL (ref 79–97)
MONOCYTES # BLD AUTO: 0.6 10*3/MM3 (ref 0.1–0.9)
MONOCYTES NFR BLD AUTO: 9.3 % (ref 5–12)
NEUTROPHILS # BLD AUTO: 4.7 10*3/MM3 (ref 1.7–7)
NEUTROPHILS NFR BLD AUTO: 67.9 % (ref 42.7–76)
NRBC BLD AUTO-RTO: 0 /100 WBC (ref 0–0.2)
PLATELET # BLD AUTO: 218 10*3/MM3 (ref 140–450)
PMV BLD AUTO: 7.9 FL (ref 6–12)
POTASSIUM BLD-SCNC: 4.2 MMOL/L (ref 3.5–5.2)
RBC # BLD AUTO: 2.66 10*6/MM3 (ref 4.14–5.8)
SODIUM BLD-SCNC: 140 MMOL/L (ref 136–145)
TSH SERPL DL<=0.05 MIU/L-ACNC: 2.98 UIU/ML (ref 0.27–4.2)
WBC NRBC COR # BLD: 6.8 10*3/MM3 (ref 3.4–10.8)

## 2020-05-10 PROCEDURE — 88342 IMHCHEM/IMCYTCHM 1ST ANTB: CPT | Performed by: INTERNAL MEDICINE

## 2020-05-10 PROCEDURE — 80048 BASIC METABOLIC PNL TOTAL CA: CPT | Performed by: STUDENT IN AN ORGANIZED HEALTH CARE EDUCATION/TRAINING PROGRAM

## 2020-05-10 PROCEDURE — 63710000001 PANTOPRAZOLE 40 MG TABLET DELAYED-RELEASE: Performed by: INTERNAL MEDICINE

## 2020-05-10 PROCEDURE — 83036 HEMOGLOBIN GLYCOSYLATED A1C: CPT | Performed by: STUDENT IN AN ORGANIZED HEALTH CARE EDUCATION/TRAINING PROGRAM

## 2020-05-10 PROCEDURE — 84443 ASSAY THYROID STIM HORMONE: CPT | Performed by: STUDENT IN AN ORGANIZED HEALTH CARE EDUCATION/TRAINING PROGRAM

## 2020-05-10 PROCEDURE — G0378 HOSPITAL OBSERVATION PER HR: HCPCS

## 2020-05-10 PROCEDURE — 96376 TX/PRO/DX INJ SAME DRUG ADON: CPT

## 2020-05-10 PROCEDURE — 85025 COMPLETE CBC W/AUTO DIFF WBC: CPT | Performed by: STUDENT IN AN ORGANIZED HEALTH CARE EDUCATION/TRAINING PROGRAM

## 2020-05-10 PROCEDURE — A9270 NON-COVERED ITEM OR SERVICE: HCPCS | Performed by: INTERNAL MEDICINE

## 2020-05-10 PROCEDURE — 83735 ASSAY OF MAGNESIUM: CPT | Performed by: STUDENT IN AN ORGANIZED HEALTH CARE EDUCATION/TRAINING PROGRAM

## 2020-05-10 PROCEDURE — 88305 TISSUE EXAM BY PATHOLOGIST: CPT | Performed by: INTERNAL MEDICINE

## 2020-05-10 PROCEDURE — 25010000002 PROPOFOL 10 MG/ML EMULSION: Performed by: ANESTHESIOLOGY

## 2020-05-10 PROCEDURE — 85018 HEMOGLOBIN: CPT | Performed by: STUDENT IN AN ORGANIZED HEALTH CARE EDUCATION/TRAINING PROGRAM

## 2020-05-10 PROCEDURE — 43239 EGD BIOPSY SINGLE/MULTIPLE: CPT | Mod: 59 | Performed by: INTERNAL MEDICINE

## 2020-05-10 PROCEDURE — 99217 PR OBSERVATION CARE DISCHARGE MANAGEMENT: CPT | Performed by: INTERNAL MEDICINE

## 2020-05-10 RX ORDER — PANTOPRAZOLE SODIUM 40 MG/1
40 TABLET, DELAYED RELEASE ORAL
Qty: 60 TABLET | Refills: 2 | Status: SHIPPED | OUTPATIENT
Start: 2020-05-10

## 2020-05-10 RX ORDER — ONDANSETRON 2 MG/ML
4 INJECTION INTRAMUSCULAR; INTRAVENOUS EVERY 6 HOURS PRN
Status: DISCONTINUED | OUTPATIENT
Start: 2020-05-10 | End: 2020-05-10

## 2020-05-10 RX ORDER — PANTOPRAZOLE SODIUM 40 MG/1
40 TABLET, DELAYED RELEASE ORAL
Status: DISCONTINUED | OUTPATIENT
Start: 2020-05-10 | End: 2020-05-10 | Stop reason: HOSPADM

## 2020-05-10 RX ORDER — PROPOFOL 10 MG/ML
VIAL (ML) INTRAVENOUS AS NEEDED
Status: DISCONTINUED | OUTPATIENT
Start: 2020-05-10 | End: 2020-05-10

## 2020-05-10 RX ORDER — ONDANSETRON 4 MG/1
4 TABLET, FILM COATED ORAL EVERY 6 HOURS PRN
Status: DISCONTINUED | OUTPATIENT
Start: 2020-05-10 | End: 2020-05-10

## 2020-05-10 RX ORDER — PROPOFOL 10 MG/ML
VIAL (ML) INTRAVENOUS AS NEEDED
Status: DISCONTINUED | OUTPATIENT
Start: 2020-05-10 | End: 2020-05-10 | Stop reason: SURG

## 2020-05-10 RX ADMIN — PANTOPRAZOLE SODIUM 40 MG: 40 TABLET, DELAYED RELEASE ORAL at 17:38

## 2020-05-10 RX ADMIN — PANTOPRAZOLE SODIUM 40 MG: 40 INJECTION, POWDER, FOR SOLUTION INTRAVENOUS at 09:25

## 2020-05-10 RX ADMIN — ISOSORBIDE MONONITRATE 60 MG: 60 TABLET, EXTENDED RELEASE ORAL at 09:23

## 2020-05-10 RX ADMIN — PROPOFOL 100 MG: 10 INJECTION, EMULSION INTRAVENOUS at 12:26

## 2020-05-10 RX ADMIN — PROPOFOL 25 MG: 10 INJECTION, EMULSION INTRAVENOUS at 12:28

## 2020-05-10 RX ADMIN — LEVOTHYROXINE SODIUM 50 MCG: 50 TABLET ORAL at 09:22

## 2020-05-10 RX ADMIN — SODIUM CHLORIDE 100 ML/HR: 900 INJECTION, SOLUTION INTRAVENOUS at 10:28

## 2020-05-10 RX ADMIN — RANOLAZINE 500 MG: 500 TABLET, FILM COATED, EXTENDED RELEASE ORAL at 09:21

## 2020-05-10 RX ADMIN — DULOXETINE HYDROCHLORIDE 30 MG: 30 CAPSULE, DELAYED RELEASE ORAL at 09:23

## 2020-05-10 RX ADMIN — METOPROLOL TARTRATE 100 MG: 50 TABLET, FILM COATED ORAL at 09:21

## 2020-05-10 NOTE — OP NOTE
ESOPHAGOGASTRODUODENOSCOPY Procedure Report    Patient Name:  Darrick Andrade  YOB: 1940    Date of Surgery:  5/10/2020     Pre-Op Diagnosis:  Melena  Acute blood loss anemia    Post-Op Diagnosis:  Nonobstructing distal esophageal stricture  3 cm hiatal hernia  Prepyloric ulcer without bleeding  Duodenal ulcer without bleeding    Procedure/CPT® Codes:  EGD with biopsy    Staff:  Surgeon(s):  Chava Wood MD         Anesthesia: Monitored Anesthesia Care    Implants:    Nothing was implanted during the procedure    Specimen:        See Below    Complications:  None    Description of Procedure:  Informed consent was obtained for the procedure, including sedation.  Risks of perforation, hemorrhage, adverse drug reaction and aspiration were discussed.  The patient was brought into the endoscopy suite. Continuous cardiopulmonary monitoring was performed. The patient was placed in the left lateral decubitus position.  The bite block was inserted into the patient's mouth. After adequate sedation was attained, the Olympus gastroscope was inserted into the patient's mouth and advanced to the second portion of the duodenum without difficulty.  Circumferential examination was performed. A retroflex exam was performed in the patient's stomach.  On completion of the exam, the bowel was decompressed, the scope was removed from the patient, the patient tolerated the procedure well, there were no immediate post-operative complications.     Examination of the esophagus: GE junction at 35 cm with asymptomatic stricture with approximate diameter of 15 mm.  3 cm hiatal hernia was also noted.    Examination of the stomach: Chronic appearing, oblong ulceration in prepyloric area, clean-based, no bleeding, nonobstructing.  Cold forceps biopsies were performed for histology.  Examination of the duodenum: 8 mm, chronic appearing duodenal ulcer, clean base, not bleeding.  Diffuse  duodenitis      Impression:  80-year-old male presenting with melena and hemoglobin of 8.6.  EGD shows ulcers in stomach and duodenum likely the source of anemia and melena.  No active bleeding.  Low risk for rebleeding.    Recommendations:  Follow-up on pathology  If H. pylori is positive we will treat with twice daily PPI Pylera x10 days next number pantoprazole 40 mg p.o. twice daily x2 to 3 months  Repeat EGD in 2 to 3 months with screening colonoscopy at that time  Resume diet  Lesions are low risk for rebleeding so patient may be discharged home today or in a.m., please call with questions    We appreciate the referral    Chava Wood MD     Date: 5/10/2020  Time: 12:36

## 2020-05-10 NOTE — CONSULTS
GI CONSULT  NOTE:    Referring Provider:  [place]    Chief complaint: Melena    Subjective .     History of present illness: Darrick Andrade is a 80 y.o. male who presents with 7 to 10 days of black tarry stools.  Yesterday, he had moderately severe lower abdominal cramping and passed more black tarry stools.  He has never had GI bleeding before.  He denies taking NSAIDs.  He has a history of coronary disease and is on aspirin 81 mg daily.  He denies a history of heartburn, dysphasia, unintentional weight loss, constipation, or diarrhea.  No fevers, cough.  Hemoglobin was 8.7.  BUN was elevated.  CT abdomen pelvis was unremarkable except for a small fat-containing umbilical hernia.  He has never had an EGD and denies previous colon cancer screening.      Endo History:  None    Past Medical History:  Past Medical History:   Diagnosis Date   • Coronary artery disease of native artery of native heart with stable angina pectoris (CMS/[facility]) 7/15/2019   • Essential hypertension 7/15/2019   • Glaucoma    • Hyperlipidemia    • Hyperlipidemia LDL goal <70 7/15/2019   • Hypertension    • Hypothyroidism    • Skin cancer        Past Surgical History:  Past Surgical History:   Procedure Laterality Date   • CARDIAC CATHETERIZATION  04/26/2019    No stents placed   • HERNIA REPAIR     • OTHER SURGICAL HISTORY      lump on back of head removed   • TOE SURGERY         Social History:  Social History     Tobacco Use   • Smoking status: Former Smoker   • Smokeless tobacco: Never Used   Substance Use Topics   • Alcohol use: No     Frequency: Never   • Drug use: No       Family History:  Family History   Problem Relation Age of Onset   • Hyperlipidemia Mother    • Hyperlipidemia Father    • Brain cancer Father        Medications:  Medications Prior to Admission   Medication Sig Dispense Refill Last Dose   • amLODIPine (NORVASC) 2.5 MG tablet Take 2.5 mg by mouth Every Night.   5/8/2020 at Unknown time   • aspirin 325 MG tablet Take 325  mg by mouth Daily.   5/9/2020 at Unknown time   • DULoxetine HCl (DRIZALMA) 20 MG capsule Take 30 mg by mouth Daily.   5/9/2020 at Unknown time   • isosorbide mononitrate (IMDUR) 60 MG 24 hr tablet Take 60 mg by mouth Daily.   5/9/2020 at Unknown time   • levothyroxine (SYNTHROID, LEVOTHROID) 50 MCG tablet Take 50 mcg by mouth Daily.   5/9/2020 at Unknown time   • losartan (COZAAR) 25 MG tablet Take 12.5 mg by mouth Every Night.   5/8/2020 at Unknown time   • metoprolol tartrate (LOPRESSOR) 100 MG tablet Take 100 mg by mouth 2 (Two) Times a Day.   5/9/2020 at Unknown time   • ranolazine (RANEXA) 500 MG 12 hr tablet Take 1 tablet by mouth 2 (Two) Times a Day. 60 tablet 6 5/9/2020 at Unknown time   • rosuvastatin (CRESTOR) 20 MG tablet Take 20 mg by mouth Every Night.   5/8/2020 at Unknown time       Scheduled Meds:  amLODIPine 2.5 mg Oral Nightly   DULoxetine 30 mg Oral Daily   isosorbide mononitrate 60 mg Oral Daily   levothyroxine 50 mcg Oral Q AM   losartan 12.5 mg Oral Nightly   metoprolol tartrate 100 mg Oral BID   pantoprazole 40 mg Intravenous Q12H   ranolazine 500 mg Oral BID   rosuvastatin 20 mg Oral Nightly   sodium chloride 10 mL Intravenous Q12H     Continuous Infusions:  sodium chloride 100 mL/hr Last Rate: 100 mL/hr (05/10/20 1028)     PRN Meds:.•  acetaminophen **OR** acetaminophen **OR** acetaminophen  •  bisacodyl  •  magnesium sulfate **OR** magnesium sulfate in D5W 1g/100mL (PREMIX)  •  melatonin  •  nitroglycerin  •  ondansetron **OR** ondansetron  •  potassium chloride **OR** potassium chloride **OR** potassium chloride  •  sodium chloride  •  sodium chloride    ALLERGIES:  Patient has no known allergies.    ROS:  The following systems were reviewed and negative;   Constitution:  No fevers, chills, no unintentional weight loss  Skin: no rash, no jaundice  Eyes:  No blurry vision, no eye pain  HENT:  No change in hearing or smell  Resp:  No dyspnea or cough  CV:  No chest pain or palpitations  :  " No dysuria, hematuria  Musculoskeletal:  No leg cramps or arthralgias  Neuro:  No tremor, no numbness  Psych:  No depression or confusion    Objective     Vital Signs:   Vitals:    05/09/20 2154 05/10/20 0059 05/10/20 0420 05/10/20 0851   BP: 150/74 135/71 112/66 117/69   BP Location: Left arm Left arm Left arm Left arm   Patient Position: Lying Lying Lying Lying   Pulse: 78 74 70 68   Resp: 21 18 16 16   Temp: 97.6 °F (36.4 °C) 97.9 °F (36.6 °C) 98 °F (36.7 °C) 98.2 °F (36.8 °C)   TempSrc: Oral Oral Oral Oral   SpO2: 95% 96% 93% 97%   Weight: 94 kg (207 lb 3.7 oz)  94.2 kg (207 lb 10.8 oz)    Height: 177.8 cm (70\")          Physical Exam:       General Appearance:    Awake and alert, in no acute distress   Head:    Normocephalic, without obvious abnormality, atraumatic   Throat:   No oral lesions, no thrush, oral mucosa moist   Lungs:     Respirations regular, even and unlabored   Chest Wall:    No abnormalities observed   Abdomen:     Soft, non-tender, no rebound or guarding, non-distended, no hepatosplenomegaly -reducible umbilical hernia nontender   Rectal:     Deferred   Extremities:   Moves all extremities, no edema, no cyanosis   Pulses:   Pulses palpable and equal bilaterally   Skin:   No rash, no jaundice, normal palpation   Lymph nodes:   No cervical, supraclavicular or submandibular palpable adenopathy   Neurologic:   Cranial nerves 2 - 12 grossly intact, no asterixis       Results Review:   I reviewed the patient's labs and imaging.  Lab Results (last 24 hours)     Procedure Component Value Units Date/Time    TSH [[id number]]  (Normal) Collected:  05/10/20 0704    Specimen:  Blood Updated:  05/10/20 0854     TSH 2.980 uIU/mL      Comment: TSH results may be falsely decreased if patient taking Biotin.       Basic Metabolic Panel [[id number]]  (Abnormal) Collected:  05/10/20 0704    Specimen:  Blood Updated:  05/10/20 0847     Glucose 102 mg/dL      BUN 20 mg/dL      Creatinine 0.80 mg/dL      Sodium 140 " mmol/L      Potassium 4.2 mmol/L      Chloride 106 mmol/L      CO2 26.0 mmol/L      Calcium 8.5 mg/dL      eGFR Non African Amer 93 mL/min/1.73      BUN/Creatinine Ratio 25.0     Anion Gap 8.0 mmol/L     Narrative:       GFR Normal >60  Chronic Kidney Disease <60  Kidney Failure <15      Magnesium [[id number]]  (Normal) Collected:  05/10/20 0704    Specimen:  Blood Updated:  05/10/20 0847     Magnesium 1.8 mg/dL     CBC Auto Differential [[id number]]  (Abnormal) Collected:  05/10/20 0704    Specimen:  Blood Updated:  05/10/20 0818     WBC 6.80 10*3/mm3      RBC 2.66 10*6/mm3      Hemoglobin 8.6 g/dL      Hematocrit 24.3 %      MCV 91.2 fL      MCH 32.5 pg      MCHC 35.6 g/dL      RDW 13.9 %      RDW-SD 45.1 fl      MPV 7.9 fL      Platelets 218 10*3/mm3      Neutrophil % 67.9 %      Lymphocyte % 20.4 %      Monocyte % 9.3 %      Eosinophil % 2.1 %      Basophil % 0.3 %      Neutrophils, Absolute 4.70 10*3/mm3      Lymphocytes, Absolute 1.40 10*3/mm3      Monocytes, Absolute 0.60 10*3/mm3      Eosinophils, Absolute 0.10 10*3/mm3      Basophils, Absolute 0.00 10*3/mm3      nRBC 0.0 /100 WBC     Hemoglobin A1c [[id number]] Collected:  05/10/20 0704    Specimen:  Blood Updated:  05/10/20 0815    Hemoglobin [[id number]]  (Abnormal) Collected:  05/10/20 0018    Specimen:  Blood Updated:  05/10/20 0037     Hemoglobin 8.3 g/dL     Lipase [[id number]]  (Normal) Collected:  05/09/20 1901    Specimen:  Blood Updated:  05/09/20 2229     Lipase 42 U/L     Urinalysis With Microscopic If Indicated (No Culture) - Urine, Clean Catch [[id number]]  (Normal) Collected:  05/09/20 2022    Specimen:  Urine, Clean Catch Updated:  05/09/20 2032     Color, UA Yellow     Appearance, UA Clear     pH, UA 6.0     Specific Gravity, UA 1.020     Glucose, UA Negative     Ketones, UA Negative     Bilirubin, UA Negative     Blood, UA Negative     Protein, UA Negative     Leuk Esterase, UA Negative     Nitrite, UA Negative     Urobilinogen, UA 0.2 E.U./dL     Narrative:       Urine microscopic not indicated.    [place] Draw [[id number]] Collected:  05/09/20 1901    Specimen:  Blood Updated:  05/09/20 2030    Narrative:       The following orders were created for panel order [place] Draw.  Procedure                               Abnormality         Status                     ---------                               -----------         ------                     Light Blue Top[[id number]]                                   Final result               Green Top (Gel)[[id number]]                                  Final result               Lavender Top[[id number]]                                     Final result               Gold Top - SST[[id number]]                                   Final result                 Please view results for these tests on the individual orders.    Gold Top - SST [[id number]] Collected:  05/09/20 1927    Specimen:  Blood from Arm, Left Updated:  05/09/20 2030     Extra Tube Hold for add-ons.     Comment: Auto resulted.       Extra Tubes [[id number]] Collected:  05/09/20 1927    Specimen:  Blood from Arm, Left Updated:  05/09/20 2030    Narrative:       The following orders were created for panel order Extra Tubes.  Procedure                               Abnormality         Status                     ---------                               -----------         ------                     Green Top (Gel)[[id number]]                                  Final result                 Please view results for these tests on the individual orders.    Green Top (Gel) [[id number]] Collected:  05/09/20 1927    Specimen:  Blood from Arm, Left Updated:  05/09/20 2030     Extra Tube Hold for add-ons.     Comment: Auto resulted.       Light Blue Top [[id number]] Collected:  05/09/20 1901    Specimen:  Blood Updated:  05/09/20 2015     Extra Tube hold for add-on     Comment: Auto resulted       Green Top (Gel) [[id number]] Collected:  05/09/20 1901    Specimen:  Blood Updated:   05/09/20 2015     Extra Tube Hold for add-ons.     Comment: Auto resulted.       Lavender Top [[id number]] Collected:  05/09/20 1901    Specimen:  Blood Updated:  05/09/20 2015     Extra Tube hold for add-on     Comment: Auto resulted       Protime-INR [[id number]]  (Normal) Collected:  05/09/20 1901    Specimen:  Blood Updated:  05/09/20 1940     Protime 10.6 Seconds      INR 1.01    Comprehensive Metabolic Panel [[id number]]  (Abnormal) Collected:  05/09/20 1901    Specimen:  Blood Updated:  05/09/20 1923     Glucose 144 mg/dL      BUN 32 mg/dL      Creatinine 0.86 mg/dL      Sodium 135 mmol/L      Potassium 4.2 mmol/L      Chloride 100 mmol/L      CO2 25.0 mmol/L      Calcium 8.5 mg/dL      Total Protein 6.9 g/dL      Albumin 4.20 g/dL      ALT (SGPT) 13 U/L      AST (SGOT) 17 U/L      Alkaline Phosphatase 36 U/L      Total Bilirubin 0.2 mg/dL      eGFR Non African Amer 86 mL/min/1.73      Globulin 2.7 gm/dL      A/G Ratio 1.6 g/dL      BUN/Creatinine Ratio 37.2     Anion Gap 10.0 mmol/L     Narrative:       GFR Normal >60  Chronic Kidney Disease <60  Kidney Failure <15      CBC & Differential [[id number]] Collected:  05/09/20 1901    Specimen:  Blood Updated:  05/09/20 1908    Narrative:       The following orders were created for panel order CBC & Differential.  Procedure                               Abnormality         Status                     ---------                               -----------         ------                     CBC Auto Differential[[id number]]        Abnormal            Final result                 Please view results for these tests on the individual orders.    CBC Auto Differential [[id number]]  (Abnormal) Collected:  05/09/20 1901    Specimen:  Blood Updated:  05/09/20 1908     WBC 8.70 10*3/mm3      RBC 2.72 10*6/mm3      Hemoglobin 8.7 g/dL      Hematocrit 24.7 %      MCV 90.9 fL      MCH 32.1 pg      MCHC 35.3 g/dL      RDW 14.0 %      RDW-SD 45.1 fl      MPV 7.3 fL      Platelets 232  10*3/mm3      Neutrophil % 71.7 %      Lymphocyte % 18.9 %      Monocyte % 7.6 %      Eosinophil % 1.4 %      Basophil % 0.4 %      Neutrophils, Absolute 6.20 10*3/mm3      Lymphocytes, Absolute 1.60 10*3/mm3      Monocytes, Absolute 0.70 10*3/mm3      Eosinophils, Absolute 0.10 10*3/mm3      Basophils, Absolute 0.00 10*3/mm3      nRBC 0.0 /100 WBC           Imaging Results (Last 24 Hours)     Procedure Component Value Units Date/Time    CT Abdomen Pelvis With Contrast [[id number]] Collected:  05/09/20 1813     Updated:  05/09/20 2019    Narrative:       Exam: CT abdomen and pelvis with contrast    Date: May 9, 2020    History: Abdominal pain, possible gastrointestinal bleeding    Comparison: None available    Technique: Contiguous axial CT images were obtained of the abdomen and pelvis following the uneventful administration of 100 mL Isovue-370 contrast. Additionally, sagittal and coronal reformatted images were obtained.  CT dose lowering techniques were   used, to include: automated exposure control, adjustment for patient size, and or use of iterative reconstruction.    Findings:    Lung bases: There is bandlike atelectasis in the right lower lobe. The heart size is prominent. There are coronary artery calcifications.    Liver: The liver is hypodense. There is a 2.2 cm cyst in the left lobe of the liver.    Spleen: Normal.    Pancreas: Normal.    Adrenal glands: Normal.    Gallbladder: Normal.    Kidneys: There are multiple small bilateral renal cysts. There is no hydronephrosis or obstructive uropathy.    Abdominal mesentery: There is a fat-containing right-sided periumbilical hernia. There is no pathologic intra-abdominal mass or adenopathy.    Bowel loops: There is extensive colonic diverticulosis. There is at least a moderate amount retained colonic fecal debris. Correlate for constipation. The appendix is normal. There is no small bowel obstruction. Active gastrointestinal bleeding is not   identified on  this study.    CT PELVIS:  The genitourinary structures are intact.    Abdominal aorta: There are diffuse atherosclerotic changes. There is no aneurysm or dissection.    Osseous structures: There is degenerative grade 1 anterolisthesis of L4 on L5. There is degenerative grade 1 retrolisthesis of L1 on L2. There are moderate degenerative changes throughout the spine. There are mild-to-moderate degenerative changes   involving both hips.      Impression:       Impression:  1. Active gastrointestinal bleeding is not identified on this study.  2. Borderline cardiomegaly with coronary artery calcifications.  3. Diverticulosis.  4. Moderate constipation.  5. Small fat-containing right-sided periumbilical hernia.      Slot 63    Electronically signed by:  Jaguar [place] M.D.    5/9/2020 6:18 PM             ASSESSMENT AND PLAN:  Acute blood loss anemia with melena -differential diagnosis includes peptic ulcer, AVM, Dieulafoy lesion, esophagitis, small bowel bleed, or right colon lesion    Principal Problem:    Gastrointestinal hemorrhage  Active Problems:    Coronary artery disease of native artery of native heart with stable angina pectoris (CMS/HCC)    Essential hypertension    Hyperlipidemia LDL goal <70    Angina pectoris (CMS/HCC)    Gastrointestinal hemorrhage with melena     Twice daily PPI  Monitor hemoglobin  Hold aspirin  EGD today  If negative will consider colonoscopy, otherwise recommend outpatient screening colonoscopy when appropriate      I discussed the patients findings and my recommendations with the patient.    We appreciate the referral    Chava Wood MD  05/10/20  11:10    Time:

## 2020-05-10 NOTE — ANESTHESIA POSTPROCEDURE EVALUATION
Patient: Darrick Andrade    Procedure Summary     Date:  05/10/20 Room / Location:  [facility] ENDOSCOPY 1 / [facility] ENDOSCOPY    Anesthesia Start:  1218 Anesthesia Stop:  1237    Procedure:  ESOPHAGOGASTRODUODENOSCOPY (N/A ) Diagnosis:       Gastrointestinal hemorrhage with melena      (Gastrointestinal hemorrhage with melena [K92.1])    Surgeon:  Chava Wood MD Provider:  Ezekiel Kelly MD    Anesthesia Type:  MAC ASA Status:  3          Anesthesia Type: MAC    Vitals  Vitals Value Taken Time   BP 87/51 5/10/2020 12:32 PM   Temp     Pulse 65 5/10/2020 12:33 PM   Resp     SpO2 100 % 5/10/2020 12:33 PM           Post Anesthesia Care and Evaluation    Patient location during evaluation: PACU  Patient participation: complete - patient participated  Level of consciousness: awake  Pain scale: See nurse's notes for pain score.  Pain management: adequate  Airway patency: patent  Anesthetic complications: No anesthetic complications  PONV Status: none  Cardiovascular status: acceptable  Respiratory status: acceptable  Hydration status: acceptable    Comments: Patient seen and examined postoperatively; vital signs stable; SpO2 greater than or equal to 90%; cardiopulmonary status stable; nausea/vomiting adequately controlled; pain adequately controlled; no apparent anesthesia complications; patient discharged from anesthesia care when discharge criteria were met

## 2020-05-10 NOTE — ANESTHESIA PREPROCEDURE EVALUATION
Anesthesia Evaluation     Patient summary reviewed and Nursing notes reviewed   NPO Solid Status: > 8 hours  NPO Liquid Status: > 8 hours           Airway   Mallampati: II  TM distance: >3 FB  Neck ROM: full  No difficulty expected  Dental - normal exam     Pulmonary - normal exam   Cardiovascular - normal exam    ECG reviewed    (+) hypertension, angina, hyperlipidemia,       Neuro/Psych  GI/Hepatic/Renal/Endo    (+)  GI bleeding ,     Musculoskeletal     Abdominal  - normal exam    Bowel sounds: normal.   Substance History      OB/GYN          Other      history of cancer                    Anesthesia Plan    ASA 3     MAC     intravenous induction     Anesthetic plan, all risks, benefits, and alternatives have been provided, discussed and informed consent has been obtained with: patient.

## 2020-05-10 NOTE — H&P
"      [facility] Medicine Services      Patient Name: Darrick Andrade  : 1940  MRN: [id number]  Primary Care Physician: Emily Batres APRN  Date of admission: 2020    Patient Care Team:  Emily Batres APRN as PCP - General  Emily Batres APRN as PCP - Family Medicine          Subjective   History Present Illness     Chief Complaint:   Chief Complaint   Patient presents with   • Rectal Bleeding       Mr. Andrade is a 80 y.o. male who  presents to [facility] ED with a history of coronary artery disease with angina, hypertension, hyperlipidemia complaining of melena.         Mr. Andrade is a 80 y.o. male who presents to [facility] ED with a history of coronary artery disease with angina, hypertension, and hyperlipidemia complaining of melena for approximately 1 week with associated intermittent, diffuse, crampy, lower abdominal pain. Patient states he assumed his dark-colored stool was from something he ate like chocolate peanut M&Ms or chocolate ice cream. When it did not dissipate he came to the ER.  He denies hematochezia, hematemesis, nausea, and vomiting.  He states he has had no changes to his normal bowel movements. He denies any unusual straining with bowel movements. He takes over-the-counter stool softeners 2 in the AM and 2 in the PM because he is, \"not as active as he used to be\".  He denies fever, chills, and shortness of breath. Patient states he thinks he is on the blood thinner but cannot remember the name.    In the ED, Hemoccult stool positive, hemoglobin 8.7, hematocrit 24.7, WBC 8.70, glucose 144, sodium 135, creatinine 0.86, BUN 32, alk phos 36. EKG shows SR 63, CT abdomen/ pelvis shows no active gastrointestinal bleeding, diverticulosis, moderate constipation, small fat-containing right-sided periumbilical hernia.  Patient admitted for further evaluation and treatment.      Review of Systems   Constitution: " Negative for chills and fever.   Cardiovascular: Positive for leg swelling. Negative for chest pain.   Respiratory: Negative for cough and shortness of breath.    Gastrointestinal: Positive for abdominal pain, constipation and melena. Negative for change in bowel habit, diarrhea, hematemesis, hematochezia, nausea and vomiting.   Genitourinary: Positive for frequency. Negative for dysuria and urgency.   All other systems reviewed and are negative.        Personal History     Past Medical History:   Past Medical History:   Diagnosis Date   • Coronary artery disease of native artery of native heart with stable angina pectoris (CMS/HCC) 7/15/2019   • Essential hypertension 7/15/2019   • Glaucoma    • Hyperlipidemia    • Hyperlipidemia LDL goal <70 7/15/2019   • Hypertension    • Hypothyroidism    • Skin cancer        Surgical History:      Past Surgical History:   Procedure Laterality Date   • CARDIAC CATHETERIZATION  04/26/2019    No stents placed   • HERNIA REPAIR     • OTHER SURGICAL HISTORY      lump on back of head removed   • TOE SURGERY           Family History: family history includes Brain cancer in his father; Hyperlipidemia in his father and mother. Otherwise pertinent FHx was reviewed and unremarkable.     Social History:  reports that he has quit smoking. He has never used smokeless tobacco. He reports that he does not drink alcohol or use drugs.      Medications:  Prior to Admission medications    Medication Sig Start Date End Date Taking? Authorizing Provider   amLODIPine (NORVASC) 2.5 MG tablet Take 2.5 mg by mouth Every Night.   Yes Martin Field MD   aspirin 325 MG tablet Take 325 mg by mouth Daily.   Yes ProviderMartin MD   DULoxetine HCl (DRIZALMA) 20 MG capsule Take 30 mg by mouth Daily.   Yes Martin Field MD   isosorbide mononitrate (IMDUR) 60 MG 24 hr tablet Take 60 mg by mouth Daily.   Yes Martin Field MD   levothyroxine (SYNTHROID, LEVOTHROID) 50 MCG tablet Take  50 mcg by mouth Daily.   Yes Provider, MD Martin   losartan (COZAAR) 25 MG tablet Take 12.5 mg by mouth Every Night.   Yes ProviderMartin MD   metoprolol tartrate (LOPRESSOR) 100 MG tablet Take 100 mg by mouth 2 (Two) Times a Day.   Yes ProviderMartin MD   ranolazine (RANEXA) 500 MG 12 hr tablet Take 1 tablet by mouth 2 (Two) Times a Day. 7/26/19  Yes Hien Parisi MD   rosuvastatin (CRESTOR) 20 MG tablet Take 20 mg by mouth Every Night. 1/14/20  Yes Martin Field MD   moxifloxacin (VIGAMOX) 0.5 % ophthalmic solution  1/15/20 5/9/20  Martin Field MD   olmesartan (BENICAR) 40 MG tablet Take 40 mg by mouth Daily. 1/2 tablet po at night 1/5/20 5/9/20  Martin Field MD       Allergies:  No Known Allergies    Objective   Objective     Vital Signs  Temp:  [97.3 °F (36.3 °C)] 97.3 °F (36.3 °C)  Heart Rate:  [66-74] 74  Resp:  [20] 20  BP: (124-143)/(68) 124/68  SpO2:  [97 %-99 %] 99 %  on   ;   Device (Oxygen Therapy): room air  Body mass index is 30.3 kg/m².    Physical Exam   Constitutional: He is oriented to person, place, and time. He appears well-developed. No distress.   HENT:   Head: Normocephalic and atraumatic.   Mouth/Throat: Oropharynx is clear and moist.   Eyes: Pupils are equal, round, and reactive to light. Conjunctivae and EOM are normal.   Neck: Normal range of motion. Neck supple.   Cardiovascular: Normal rate, regular rhythm, normal heart sounds and intact distal pulses.   Pulmonary/Chest: Effort normal and breath sounds normal. No respiratory distress.   Abdominal: Soft. He exhibits no distension. There is no tenderness.   Hyperactive bowel sounds   Musculoskeletal: Normal range of motion. He exhibits edema.   Neurological: He is alert and oriented to person, place, and time.   Skin: Skin is warm and dry. Capillary refill takes 2 to 3 seconds. He is not diaphoretic.   Psychiatric: He has a normal mood and affect. His behavior is normal. Judgment and  thought content normal.   Vitals reviewed.      Results Review:  I have personally reviewed most recent cardiac tracings, lab results and radiology images and interpretations and agree with findings.    Results from last 7 days   Lab Units 05/09/20 1901   WBC 10*3/mm3 8.70   HEMOGLOBIN g/dL 8.7*   HEMATOCRIT % 24.7*   PLATELETS 10*3/mm3 232   INR  1.01     Results from last 7 days   Lab Units 05/09/20 1901   SODIUM mmol/L 135*   POTASSIUM mmol/L 4.2   CHLORIDE mmol/L 100   CO2 mmol/L 25.0   BUN mg/dL 32*   CREATININE mg/dL 0.86   GLUCOSE mg/dL 144*   CALCIUM mg/dL 8.5*   ALT (SGPT) U/L 13   AST (SGOT) U/L 17     Estimated Creatinine Clearance: 80.9 mL/min (by C-G formula based on SCr of 0.86 mg/dL).  Brief Urine Lab Results  (Last result in the past 365 days)      Color   Clarity   Blood   Leuk Est   Nitrite   Protein   CREAT   Urine HCG        05/09/20 2022 Yellow Clear Negative Negative Negative Negative               Microbiology Results (last 10 days)     ** No results found for the last 240 hours. **          ECG/EMG Results (most recent)     Procedure Component Value Units Date/Time    ECG 12 Lead [[id number]] Collected:  05/09/20 1916     Updated:  05/09/20 1918    Narrative:       HEART RATE= 63  bpm  RR Interval= 960  ms  KY Interval= 213  ms  P Horizontal Axis= 23  deg  P Front Axis= 53  deg  QRSD Interval= 95  ms  QT Interval= 421  ms  QRS Axis= -29  deg  T Wave Axis= 17  deg  - BORDERLINE ECG -  Sinus rhythm  Borderline prolonged KY interval  Electronically Signed By:   Date and Time of Study: 2020-05-09 19:16:50                  Ct Abdomen Pelvis With Contrast    Result Date: 5/9/2020  Impression: 1. Active gastrointestinal bleeding is not identified on this study. 2. Borderline cardiomegaly with coronary artery calcifications. 3. Diverticulosis. 4. Moderate constipation. 5. Small fat-containing right-sided periumbilical hernia. Slot 63 Electronically signed by:  Jaguar West M.D.  5/9/2020 6:18  PM        Estimated Creatinine Clearance: 80.9 mL/min (by C-G formula based on SCr of 0.86 mg/dL).    Assessment/Plan   Assessment/Plan       Active Hospital Problems    Diagnosis  POA   • **Gastrointestinal hemorrhage [K92.2]  Yes     Priority: High   • Coronary artery disease of native artery of native heart with stable angina pectoris (CMS/HCC) [I25.118]  Yes   • Essential hypertension [I10]  Yes   • Hyperlipidemia LDL goal <70 [E78.5]  Yes   • Angina pectoris (CMS/HCC) [I20.9]  Yes      Resolved Hospital Problems   No resolved problems to display.     GI Bleed  -Lipase  -Hgb 8.7, HCT 24.7, trend  -Type and Screen complete  -IV Protonix 40mg BID  -GI Consult  -NS @ 100cc/hr  -Hold anticoags  -NPO    Essential Hypertension, Chronic, Controlled   -Continue home Norvasc, losartan, Lopressor  - Monitor with routine vital signs     Hypothyroidism  -TSH   -Continue levothyroxine, give on an empty stomach at least 30 minutes before meals or calcium carbonate therapy    CAD with chronic angina    --Patient thinks he is on a blood thinner however unable to obtain name from pharmacy fill history or old records  -Continue Imdur, Ranexa  -Hold aspirin    Elevated blood glucose level  -Glucose 144  -A1C in a.m.  -Repeat BMP in am    Hyperlipidemia  -Continue rosuvastatin    Anxiety/Depression  -Continue duloxetine      VTE Prophylaxis -   Mechanical Order History:     SCDs      Pharmalogical Order History:     None          CODE STATUS:    Code Status and Medical Interventions:   Ordered at: 05/09/20 9706     Code Status:    CPR     Medical Interventions (Level of Support Prior to Arrest):    Full       This patient has been examined wearing appropriate Personal Protective Equipment. 05/09/20      I discussed the patient's findings and my recommendations with patient.        Electronically signed by LALI Crowell, 05/09/20, 8:55 PM.  [facility] Hospitalist Team

## 2020-05-10 NOTE — DISCHARGE INSTR - APPOINTMENTS
Follow up with [facility] GI in 2 months for follow up GI studies.   Will call for ant test results    [phone]

## 2020-05-10 NOTE — DISCHARGE SUMMARY
[facility] Medicine Services  DISCHARGE SUMMARY        Prepared For PCP:  Emily Batres APRN    Patient Name: Darrick Andrade  : 1940  MRN: [id number]      Date of Admission:   2020    Date of Discharge:  5/10/2020    Length of stay:  LOS: 0 days     Hospital Course     Presenting Problem:   Lower abdominal pain [R10.30]  Gastrointestinal hemorrhage, unspecified gastrointestinal hemorrhage type [K92.2]      Active Hospital Problems    Diagnosis  POA   • **Gastrointestinal hemorrhage [K92.2]  Yes   • Gastrointestinal hemorrhage with melena [K92.1]  Unknown   • Coronary artery disease of native artery of native heart with stable angina pectoris (CMS/HCC) [I25.118]  Yes   • Essential hypertension [I10]  Yes   • Hyperlipidemia LDL goal <70 [E78.5]  Yes   • Angina pectoris (CMS/HCC) [I20.9]  Yes      Resolved Hospital Problems   No resolved problems to display.     GI Bleed  -Hgb 8.7, HCT 24.7, trend hgl 8.6  - Protonix 40mg BID  -GI Consulted  -Hold anticoags     Essential Hypertension, Chronic, Controlled   -Continue home Norvasc, losartan, Lopressor     Hypothyroidism-tsh2.9  -Continue levothyroxine, give on an empty stomach at least 30 minutes before meals or calcium carbonate therapy     CAD with chronic angina    --Patient thinks he is on a blood thinner however unable to obtain name from pharmacy fill history or old records  -Continue Imdur, Ranexa  -Hold aspirin     Elevated blood glucose level  -Glucose 144     Hyperlipidemia  -Continue rosuvastatin     Anxiety/Depression  -Continue duloxetine        Hospital Course:  Darrick Andrade is a 80 y.o. male who presents to [facility] ED with a history of coronary artery disease with angina, hypertension, and hyperlipidemia complaining of melena for approximately 1 week with associated intermittent, diffuse, crampy, lower abdominal pain. Patient states he assumed his dark-colored stool was from something he  "ate like chocolate peanut M&Ms or chocolate ice cream. When it did not dissipate he came to the ER.  He denies hematochezia, hematemesis, nausea, and vomiting.  He states he has had no changes to his normal bowel movements. He denies any unusual straining with bowel movements. He takes over-the-counter stool softeners 2 in the AM and 2 in the PM because he is, \"not as active as he used to be\".  He denies fever, chills, and shortness of breath. Patient states he thinks he is on the blood thinner but cannot remember the name.     In the ED, Hemoccult stool positive, hemoglobin 8.7, hematocrit 24.7, WBC 8.70, glucose 144, sodium 135, creatinine 0.86, BUN 32, alk phos 36. EKG shows SR 63, CT abdomen/ pelvis shows no active gastrointestinal bleeding, diverticulosis, moderate constipation, small fat-containing right-sided periumbilical hernia.  Patient admitted for further evaluation and treatment.        5/10- EGD shows ulcers in stomach and duodenum likely the source of anemia and melena.  No active bleeding.  Low risk for rebleeding.    Will dc home        Reasons For Change In Medications and Indications for New Medications:    protonix bid    Day of Discharge     HPI:       Vital Signs:   Temp:  [97.3 °F (36.3 °C)-98.2 °F (36.8 °C)] 97.4 °F (36.3 °C)  Heart Rate:  [60-78] 62  Resp:  [10-21] 13  BP: ()/(46-74) 106/66     Physical Exam:  Physical Exam   Constitutional: He is oriented to person, place, and time. He appears well-developed and well-nourished. No distress.   HENT:   Head: Normocephalic and atraumatic.   Mouth/Throat: No oropharyngeal exudate.   Eyes: Pupils are equal, round, and reactive to light. Conjunctivae and EOM are normal.   Neck: Normal range of motion.   Cardiovascular: Normal rate, regular rhythm, normal heart sounds and intact distal pulses.   Pulmonary/Chest: Effort normal and breath sounds normal.   Abdominal: Soft. Bowel sounds are normal.   Musculoskeletal: Normal range of motion. He " exhibits no edema.   Neurological: He is alert and oriented to person, place, and time. Coordination normal.   Skin: Skin is warm and dry. He is not diaphoretic. No erythema. There is pallor.   Psychiatric: He has a normal mood and affect. His behavior is normal.   Nursing note and vitals reviewed.      Pertinent  and/or Most Recent Results     Results from last 7 days   Lab Units 05/10/20  0704 05/10/20  0018 05/09/20  1901   WBC 10*3/mm3 6.80  --  8.70   HEMOGLOBIN g/dL 8.6* 8.3* 8.7*   HEMATOCRIT % 24.3*  --  24.7*   PLATELETS 10*3/mm3 218  --  232   SODIUM mmol/L 140  --  135*   POTASSIUM mmol/L 4.2  --  4.2   CHLORIDE mmol/L 106  --  100   CO2 mmol/L 26.0  --  25.0   BUN mg/dL 20  --  32*   CREATININE mg/dL 0.80  --  0.86   GLUCOSE mg/dL 102*  --  144*   CALCIUM mg/dL 8.5*  --  8.5*     Results from last 7 days   Lab Units 05/09/20  1901   BILIRUBIN mg/dL 0.2   ALK PHOS U/L 36*   ALT (SGPT) U/L 13   AST (SGOT) U/L 17   PROTIME Seconds 10.6   INR  1.01           Invalid input(s): TG, LDLCALC, LDLREALC  Results from last 7 days   Lab Units 05/10/20  0704   TSH uIU/mL 2.980       Brief Urine Lab Results  (Last result in the past 365 days)      Color   Clarity   Blood   Leuk Est   Nitrite   Protein   CREAT   Urine HCG        05/09/20 2022 Yellow Clear Negative Negative Negative Negative               Microbiology Results Abnormal     None          Ct Abdomen Pelvis With Contrast    Result Date: 5/9/2020  Impression: Impression: 1. Active gastrointestinal bleeding is not identified on this study. 2. Borderline cardiomegaly with coronary artery calcifications. 3. Diverticulosis. 4. Moderate constipation. 5. Small fat-containing right-sided periumbilical hernia. Slot 63 Electronically signed by:  Jaguar West M.D.  5/9/2020 6:18 PM                             Test Results Pending at Discharge   Order Current Status    Tissue Pathology Exam Collected (05/10/20 1230)    Hemoglobin A1c In process            Procedures  Performed  Procedure(s):  ESOPHAGOGASTRODUODENOSCOPY         Consults:   Consults     Date and Time Order Name Status Description    5/9/2020 2022 Gastroenterology (on-call MD unless specified) Completed     5/9/2020 2022 Hospitalist (on-call MD unless specified) Completed         Principal Problem:    Gastrointestinal hemorrhage  Active Problems:    Coronary artery disease of native artery of native heart with stable angina pectoris (CMS/HCC)    Essential hypertension    Hyperlipidemia LDL goal <70    Angina pectoris (CMS/HCC)    Gastrointestinal hemorrhage with melena     Twice daily PPI  Monitor hemoglobin  Hold aspirin  EGD today  If negative will consider colonoscopy, otherwise recommend outpatient screening colonoscopy when appropriate     I discussed the patients findings and my recommendations with the patient.  We appreciate the referral  Chava Wood MD  05/10/20    Impression:  80-year-old male presenting with melena and hemoglobin of 8.6.  EGD shows ulcers in stomach and duodenum likely the source of anemia and melena.  No active bleeding.  Low risk for rebleeding.     Recommendations:  Follow-up on pathology  If H. pylori is positive we will treat with twice daily PPI Pylera x10 days next number pantoprazole 40 mg p.o. twice daily x2 to 3 months  Repeat EGD in 2 to 3 months with screening colonoscopy at that time  Resume diet  Lesions are low risk for rebleeding so patient may be discharged home today or in a.m., please call with questions     We appreciate the referral     Chava Wood MD      Date: 5/10/2020  Time: 12:36     Discharge Details        Discharge Medications      ASK your doctor about these medications      Instructions Start Date   amLODIPine 2.5 MG tablet  Commonly known as:  NORVASC   2.5 mg, Oral, Nightly      aspirin 325 MG tablet   325 mg, Oral, Daily      DULoxetine HCl 20 MG capsule  Commonly known as:  DRIZALMA   30 mg, Oral, Daily      isosorbide  mononitrate 60 MG 24 hr tablet  Commonly known as:  IMDUR   60 mg, Oral, Daily      levothyroxine 50 MCG tablet  Commonly known as:  SYNTHROID, LEVOTHROID   50 mcg, Oral, Daily      losartan 25 MG tablet  Commonly known as:  COZAAR   12.5 mg, Oral, Nightly      metoprolol tartrate 100 MG tablet  Commonly known as:  LOPRESSOR   100 mg, Oral, 2 Times Daily      ranolazine 500 MG 12 hr tablet  Commonly known as:  Ranexa   500 mg, Oral, 2 Times Daily      rosuvastatin 20 MG tablet  Commonly known as:  CRESTOR   20 mg, Oral, Nightly             No Known Allergies      Discharge Disposition:      Diet:  Hospital:  Diet Order   Procedures   • Diet Cardiac; Healthy Heart         Discharge Activity:         CODE STATUS:    Code Status and Medical Interventions:   Ordered at: 05/09/20 2118     Code Status:    CPR     Medical Interventions (Level of Support Prior to Arrest):    Full         Follow-up Appointments  Future Appointments   Date Time Provider Department Center   7/16/2020  1:45 PM Hien Parisi MD [facility] None             Condition on Discharge:      Stable      This patient has been examined wearing appropriate Personal Protective Equipment and discussed with hospital infection control department. 05/10/20      Electronically signed by Dinesh Ozuna MD, 05/10/20, 3:11 PM.      Time: I spent  34  minutes on this discharge activity which included face-to-face encounter with the patient/reviewing the data in the system/coordination of the care with the nursing staff as well as consultants/documentation/entering orders.

## 2020-05-10 NOTE — NURSING NOTE
Routine consult called to Dr Wood's  Office to see pt in the am. Message left with the answering service.

## 2020-05-11 LAB — HBA1C MFR BLD: 5.4 % (ref 3.5–5.6)

## 2020-05-11 NOTE — PROGRESS NOTES
Case Management Discharge Note      Final Note: Discharged home self care                   Final Discharge Disposition Code: 01 - home or self-care

## 2020-05-12 ENCOUNTER — NURSE TRIAGE (OUTPATIENT)
Dept: CALL CENTER | Facility: HOSPITAL | Age: 80
End: 2020-05-12

## 2020-05-12 LAB
LAB AP CASE REPORT: NORMAL
PATH REPORT.FINAL DX SPEC: NORMAL
PATH REPORT.GROSS SPEC: NORMAL

## 2020-05-12 NOTE — TELEPHONE ENCOUNTER
"Caller states that his prescription was sent to the wrong pharmacy.  He states that he called back with the correct insurance and which pharmacy it should go to but it still isn't there.  Epic records reviewed, prescription called to the correct pharmacy for patient.  Prescription information in Epic records.  Pantoprazole 40mg bid before meal dispense 60 with 2 refills called to Esthela Perez at [facility] in [facility] [place], read back.  Caller notified that prescription has been called in.    Reason for Disposition  • Caller requesting a refill, no triage required, and triager able to refill per unit policy    Additional Information  • Negative: Drug overdose and nurse unable to answer question  • Negative: Caller requesting information not related to medicine  • Negative: Caller requesting a prescription for Strep throat and has a positive culture result  • Negative: Rash while taking a medication or within 3 days of stopping it  • Negative: Immunization reaction suspected  • Negative: [1] Asthma and [2] having symptoms of asthma (cough, wheezing, etc)  • Negative: MORE THAN A DOUBLE DOSE of a prescription or over-the-counter (OTC) drug  • Negative: [1] DOUBLE DOSE (an extra dose or lesser amount) of over-the-counter (OTC) drug AND [2] any symptoms (e.g., dizziness, nausea, pain, sleepiness)  • Negative: [1] DOUBLE DOSE (an extra dose or lesser amount) of prescription drug AND [2] any symptoms (e.g., dizziness, nausea, pain, sleepiness)  • Negative: Took another person's prescription drug  • Negative: [1] DOUBLE DOSE (an extra dose or lesser amount) of prescription drug AND [2] NO symptoms (Exception: a double dose of antibiotics)  • Negative: Diabetes drug error or overdose (e.g., insulin or extra dose)  • Negative: [1] Request for URGENT new prescription or refill of \"essential\" medication (i.e., likelihood of harm to patient if not taken) AND [2] triager unable to fill per unit policy  • Negative: [1] " "Prescription not at pharmacy AND [2] was prescribed today by PCP  • Negative: Pharmacy calling with prescription questions and triager unable to answer question  • Negative: Caller has URGENT medication question about med that PCP prescribed and triager unable to answer question  • Negative: Caller has NON-URGENT medication question about med that PCP prescribed and triager unable to answer question  • Negative: Caller requesting a NON-URGENT new prescription or refill and triager unable to refill per unit policy  • Negative: Caller has medication question about med not prescribed by PCP and triager unable to answer question (e.g., compatibility with other med, storage)  • Negative: [1] DOUBLE DOSE (an extra dose or lesser amount) of over-the-counter (OTC) drug AND [2] NO symptoms  • Negative: [1] DOUBLE DOSE (an extra dose or lesser amount) of antibiotic drug AND [2] NO symptoms  • Negative: Caller has medication question only, adult not sick, and triager answers question  • Negative: Caller has medication question, adult has minor symptoms, caller declines triage, and triager answers question  • Negative: Caller requesting information about medication during pregnancy; adult is not ill and triager answers question  • Negative: Caller requesting information about medication use with breastfeeding; neither adult nor infant is ill, and triager answers question  • Negative: Pharmacy calling with prescription question and triager answers question    Answer Assessment - Initial Assessment Questions  1. SYMPTOMS: \"Do you have any symptoms?\"      My prescription was called to the wrong pharmacy.  I called back and gave them the correct information but it still isn't there.  2. SEVERITY: If symptoms are present, ask \"Are they mild, moderate or severe?\"      na    Protocols used: MEDICATION QUESTION CALL-ADULT-      "

## 2020-07-13 RX ORDER — RANOLAZINE 500 MG/1
TABLET, EXTENDED RELEASE ORAL
Qty: 60 TABLET | Refills: 5 | Status: SHIPPED | OUTPATIENT
Start: 2020-07-13 | End: 2021-01-14

## 2020-07-24 ENCOUNTER — OFFICE (OUTPATIENT)
Dept: URBAN - METROPOLITAN AREA CLINIC 64 | Facility: CLINIC | Age: 80
End: 2020-07-24

## 2020-07-24 VITALS
SYSTOLIC BLOOD PRESSURE: 112 MMHG | HEART RATE: 55 BPM | HEIGHT: 70 IN | WEIGHT: 204 LBS | DIASTOLIC BLOOD PRESSURE: 63 MMHG

## 2020-07-24 DIAGNOSIS — K25.9 GASTRIC ULCER, UNSPECIFIED AS ACUTE OR CHRONIC, WITHOUT HEMO: ICD-10-CM

## 2020-07-24 DIAGNOSIS — I25.10 ATHEROSCLEROTIC HEART DISEASE OF NATIVE CORONARY ARTERY WITH: ICD-10-CM

## 2020-07-24 DIAGNOSIS — D50.0 IRON DEFICIENCY ANEMIA SECONDARY TO BLOOD LOSS (CHRONIC): ICD-10-CM

## 2020-07-24 DIAGNOSIS — Z12.11 ENCOUNTER FOR SCREENING FOR MALIGNANT NEOPLASM OF COLON: ICD-10-CM

## 2020-07-24 PROCEDURE — 99214 OFFICE O/P EST MOD 30 MIN: CPT | Performed by: INTERNAL MEDICINE

## 2020-09-09 ENCOUNTER — OFFICE VISIT (OUTPATIENT)
Dept: CARDIOLOGY | Facility: CLINIC | Age: 80
End: 2020-09-09

## 2020-09-09 VITALS
BODY MASS INDEX: 29.27 KG/M2 | HEART RATE: 65 BPM | WEIGHT: 204 LBS | OXYGEN SATURATION: 95 % | DIASTOLIC BLOOD PRESSURE: 57 MMHG | SYSTOLIC BLOOD PRESSURE: 99 MMHG

## 2020-09-09 DIAGNOSIS — I20.9 ANGINA PECTORIS (HCC): Chronic | ICD-10-CM

## 2020-09-09 DIAGNOSIS — E78.5 HYPERLIPIDEMIA LDL GOAL <70: Chronic | ICD-10-CM

## 2020-09-09 DIAGNOSIS — I25.118 CORONARY ARTERY DISEASE OF NATIVE ARTERY OF NATIVE HEART WITH STABLE ANGINA PECTORIS (HCC): Primary | Chronic | ICD-10-CM

## 2020-09-09 DIAGNOSIS — I10 ESSENTIAL HYPERTENSION: Chronic | ICD-10-CM

## 2020-09-09 PROCEDURE — 99214 OFFICE O/P EST MOD 30 MIN: CPT | Performed by: INTERNAL MEDICINE

## 2020-09-09 PROCEDURE — 93000 ELECTROCARDIOGRAM COMPLETE: CPT | Performed by: INTERNAL MEDICINE

## 2020-09-09 RX ORDER — FERROUS SULFATE 325(65) MG
325 TABLET ORAL
Status: ON HOLD | COMMUNITY
End: 2022-01-27

## 2020-09-09 NOTE — PROGRESS NOTES
Cardiology Office Visit      Encounter Date:  09/09/2020    Patient ID:   Darrick Andrade is a 80 y.o. male.      Reason For Followup:  Coronary artery disease  Stable angina  Hypertension  Hyperlipidemia    Brief Clinical History:  Dear Emily Etienne APRN    I had the pleasure of seeing Darrick Andrade today. As you are well aware, this is a 80 y.o. male with past medical history that is significant for history of  hypertension hyperlipidemia, currently on maximal medical therapy including aspirin statin beta-blocker and Imdur continued to have recurrent episodes of chest discomfort of the new suspicious for anginal equivalent.     Echocardiogram with normal LV systolic function  Patient cardiac catheterization showed severe 2 vessel coronary artery disease involving LAD and ostium of the right coronary artery/ Likely LAD is a culprit lesion for patient's symptoms of angina    Interval History:  Patient denies any further symptoms of chest discomfort  Able to exercise without any significant limitation    Assessment & Plan    Impressions:  Coronary artery disease  Stable angina  Hypertension  Hyperlipidemia    Recommendations:  Lad lesion is somewhat risky in terms of intervention plan to treat him medically at this time   patient likes to try conservative therapy at this time   continue Ranexa and close monitoring  If the patient continues to be symptomatic will consider high-risk PCI to the LAD   need for close monitoring and follow-up discussed with the patient   cath films reviewed with the patient  Patient denies any further symptoms of chest discomfort with Ranexa  Continue Ranexa and maximal medical therapy for now  If patient fails medical therapy consider PCI and stenting of the mid LAD at the diagonal bifurcation  Stable from cardiac standpoint  Follow-up with primary care physician for labs  Follow-up in office in 6 months    Objective:    Vitals:  Vitals:    09/09/20 1311   BP: 99/57    Pulse: 65   SpO2: 95%   Weight: 92.5 kg (204 lb)       Physical Exam:    General: Alert, cooperative, no distress, appears stated age  Head:  Normocephalic, atraumatic, mucous membranes moist  Eyes:  Conjunctiva/corneas clear, EOM's intact     Neck:  Supple,  no adenopathy;      Lungs: Clear to auscultation bilaterally, no wheezes rhonchi rales are noted  Chest wall: No tenderness  Heart::  Regular rate and rhythm, S1 and S2 normal, no murmur, rub or gallop  Abdomen: Soft, non-tender, nondistended bowel sounds active  Extremities: No cyanosis, clubbing, or edema  Pulses: 2+ and symmetric all extremities  Skin:  No rashes or lesions  Neuro/psych: A&O x3. CN II through XII are grossly intact with appropriate affect      Allergies:  No Known Allergies    Medication Review:     Current Outpatient Medications:   •  amLODIPine (NORVASC) 2.5 MG tablet, Take 2.5 mg by mouth Every Night., Disp: , Rfl:   •  DULoxetine HCl (DRIZALMA) 20 MG capsule, Take 20 mg by mouth Daily., Disp: , Rfl:   •  ferrous sulfate 325 (65 FE) MG tablet, Take 325 mg by mouth Daily With Breakfast., Disp: , Rfl:   •  isosorbide mononitrate (IMDUR) 60 MG 24 hr tablet, Take 60 mg by mouth Daily., Disp: , Rfl:   •  levothyroxine (SYNTHROID, LEVOTHROID) 50 MCG tablet, Take 50 mcg by mouth Daily., Disp: , Rfl:   •  losartan (COZAAR) 25 MG tablet, Take 12.5 mg by mouth Every Night., Disp: , Rfl:   •  metoprolol tartrate (LOPRESSOR) 100 MG tablet, Take 100 mg by mouth 2 (Two) Times a Day., Disp: , Rfl:   •  pantoprazole (PROTONIX) 40 MG EC tablet, Take 1 tablet by mouth 2 (Two) Times a Day Before Meals., Disp: 60 tablet, Rfl: 2  •  ranolazine (RANEXA) 500 MG 12 hr tablet, TAKE ONE TABLET BY MOUTH TWO TIMES A DAY, Disp: 60 tablet, Rfl: 5  •  rosuvastatin (CRESTOR) 20 MG tablet, Take 20 mg by mouth Every Night., Disp: , Rfl:     Family History:  Family History   Problem Relation Age of Onset   • Hyperlipidemia Mother    • Hyperlipidemia Father    • Brain  cancer Father        Past Medical History:  Past Medical History:   Diagnosis Date   • Coronary artery disease of native artery of native heart with stable angina pectoris (CMS/HCC) 7/15/2019   • Essential hypertension 7/15/2019   • Glaucoma    • Hyperlipidemia    • Hyperlipidemia LDL goal <70 7/15/2019   • Hypertension    • Hypothyroidism    • Skin cancer        Past surgical History:  Past Surgical History:   Procedure Laterality Date   • CARDIAC CATHETERIZATION  04/26/2019    No stents placed   • ENDOSCOPY N/A 5/10/2020    Procedure: ESOPHAGOGASTRODUODENOSCOPY  WITH BIOPSY X 1 AREA;  Surgeon: Chava Wood MD;  Location: [facility] ENDOSCOPY;  Service: Gastroenterology;  Laterality: N/A;  POST OP: DUODENAL ULCER, GASTRIC ULCER, HITAL HERNIA, ESOPHAGEAL STRICTURE   • HERNIA REPAIR     • OTHER SURGICAL HISTORY      lump on back of head removed   • TOE SURGERY         Social History:  Social History     Socioeconomic History   • Marital status:      Spouse name: Not on file   • Number of children: Not on file   • Years of education: Not on file   • Highest education level: Not on file   Tobacco Use   • Smoking status: Former Smoker   • Smokeless tobacco: Never Used   Substance and Sexual Activity   • Alcohol use: No     Frequency: Never   • Drug use: No   • Sexual activity: Defer       Review of Systems:  The following systems were reviewed as they relate to the cardiovascular system: Constitutional, Eyes, ENT, Cardiovascular, Respiratory, Gastrointestinal, Integumentary, Neurological, Psychiatric, Hematologic, Endocrine, Musculoskeletal, and Genitourinary. The pertinent cardiovascular findings are reported above with all other cardiovascular points within those systems being negative.    Diagnostic Study Review:     Current Electrocardiogram:    ECG 12 Lead  Date/Time: 9/9/2020 4:03 PM  Performed by: Hien Parisi MD  Authorized by: Hien Parisi MD   Comparison: compared with  previous ECG   Similar to previous ECG  Rhythm: sinus rhythm  Rate: normal  BPM: 65  Conduction: conduction normal  QRS axis: left  Other findings: non-specific ST-T wave changes    Clinical impression: abnormal EKG              NOTE: The following portions of the patient's history were reviewed and updated this visit as appropriate: allergies, current medications, past family history, past medical history, past social history, past surgical history and problem list.

## 2020-10-08 ENCOUNTER — ON CAMPUS - OUTPATIENT (OUTPATIENT)
Dept: URBAN - METROPOLITAN AREA HOSPITAL 2 | Facility: HOSPITAL | Age: 80
End: 2020-10-08
Payer: COMMERCIAL

## 2020-10-08 ENCOUNTER — OFFICE (OUTPATIENT)
Dept: URBAN - METROPOLITAN AREA PATHOLOGY 4 | Facility: PATHOLOGY | Age: 80
End: 2020-10-08
Payer: COMMERCIAL

## 2020-10-08 VITALS
RESPIRATION RATE: 19 BRPM | SYSTOLIC BLOOD PRESSURE: 88 MMHG | DIASTOLIC BLOOD PRESSURE: 69 MMHG | TEMPERATURE: 95.5 F | DIASTOLIC BLOOD PRESSURE: 84 MMHG | HEART RATE: 60 BPM | WEIGHT: 209 LBS | SYSTOLIC BLOOD PRESSURE: 101 MMHG | HEART RATE: 56 BPM | SYSTOLIC BLOOD PRESSURE: 111 MMHG | OXYGEN SATURATION: 96 % | OXYGEN SATURATION: 98 % | HEART RATE: 63 BPM | OXYGEN SATURATION: 99 % | SYSTOLIC BLOOD PRESSURE: 115 MMHG | DIASTOLIC BLOOD PRESSURE: 119 MMHG | SYSTOLIC BLOOD PRESSURE: 114 MMHG | DIASTOLIC BLOOD PRESSURE: 66 MMHG | DIASTOLIC BLOOD PRESSURE: 80 MMHG | SYSTOLIC BLOOD PRESSURE: 112 MMHG | SYSTOLIC BLOOD PRESSURE: 92 MMHG | HEART RATE: 64 BPM | SYSTOLIC BLOOD PRESSURE: 137 MMHG | DIASTOLIC BLOOD PRESSURE: 65 MMHG | RESPIRATION RATE: 16 BRPM | HEART RATE: 62 BPM | SYSTOLIC BLOOD PRESSURE: 129 MMHG | HEART RATE: 61 BPM | HEART RATE: 57 BPM | DIASTOLIC BLOOD PRESSURE: 82 MMHG | OXYGEN SATURATION: 90 % | HEIGHT: 70 IN | RESPIRATION RATE: 17 BRPM | DIASTOLIC BLOOD PRESSURE: 55 MMHG | DIASTOLIC BLOOD PRESSURE: 57 MMHG | OXYGEN SATURATION: 93 % | SYSTOLIC BLOOD PRESSURE: 121 MMHG | DIASTOLIC BLOOD PRESSURE: 62 MMHG | OXYGEN SATURATION: 95 % | HEART RATE: 65 BPM | RESPIRATION RATE: 18 BRPM

## 2020-10-08 DIAGNOSIS — K64.1 SECOND DEGREE HEMORRHOIDS: ICD-10-CM

## 2020-10-08 DIAGNOSIS — D50.0 IRON DEFICIENCY ANEMIA SECONDARY TO BLOOD LOSS (CHRONIC): ICD-10-CM

## 2020-10-08 DIAGNOSIS — D12.3 BENIGN NEOPLASM OF TRANSVERSE COLON: ICD-10-CM

## 2020-10-08 DIAGNOSIS — K63.3 ULCER OF INTESTINE: ICD-10-CM

## 2020-10-08 DIAGNOSIS — D12.2 BENIGN NEOPLASM OF ASCENDING COLON: ICD-10-CM

## 2020-10-08 DIAGNOSIS — Z12.11 ENCOUNTER FOR SCREENING FOR MALIGNANT NEOPLASM OF COLON: ICD-10-CM

## 2020-10-08 DIAGNOSIS — K44.9 DIAPHRAGMATIC HERNIA WITHOUT OBSTRUCTION OR GANGRENE: ICD-10-CM

## 2020-10-08 DIAGNOSIS — K57.30 DIVERTICULOSIS OF LARGE INTESTINE WITHOUT PERFORATION OR ABS: ICD-10-CM

## 2020-10-08 DIAGNOSIS — K25.9 GASTRIC ULCER, UNSPECIFIED AS ACUTE OR CHRONIC, WITHOUT HEMO: ICD-10-CM

## 2020-10-08 PROBLEM — K63.5 POLYP OF COLON: Status: ACTIVE | Noted: 2020-10-08

## 2020-10-08 LAB
GI HISTOLOGY: A. UNSPECIFIED: (no result)
GI HISTOLOGY: B. UNSPECIFIED: (no result)
GI HISTOLOGY: C. UNSPECIFIED: (no result)
GI HISTOLOGY: PDF REPORT: (no result)

## 2020-10-08 PROCEDURE — 43235 EGD DIAGNOSTIC BRUSH WASH: CPT | Performed by: INTERNAL MEDICINE

## 2020-10-08 PROCEDURE — 45385 COLONOSCOPY W/LESION REMOVAL: CPT | Mod: PT | Performed by: INTERNAL MEDICINE

## 2020-10-08 PROCEDURE — 88305 TISSUE EXAM BY PATHOLOGIST: CPT | Mod: 26 | Performed by: INTERNAL MEDICINE

## 2020-10-08 PROCEDURE — 45380 COLONOSCOPY AND BIOPSY: CPT | Mod: 59,PT | Performed by: INTERNAL MEDICINE

## 2020-10-08 NOTE — SERVICEHPINOTES
JERONIMO FERRARA  is a  80  male   who presents today for a  EGD-Colonoscopy   for   the indications listed below. The updated Patient Profile was reviewed prior to the procedure, in conjunction with the Physical Exam, including medical conditions, surgical procedures, medications, allergies, family history and social history. See Physical Exam time stamp below for date and time of HPI completion.Pre-operatively, I reviewed the indication(s) for the procedure, the risks of the procedure [including but not limited to: unexpected bleeding possibly requiring hospitalization and/or unplanned repeat procedures, perforation possibly requiring surgical treatment, missed lesions and complications of sedation/MAC (also explained by anesthesia staff)]. I have evaluated the patient for risks associated with the planned anesthesia and the procedure to be performed and find the patient an acceptable candidate for IV sedation.Multiple opportunities were provided for any questions or concerns, and all questions were answered satisfactorily before any anesthesia was administered. We will proceed with the planned procedure.BR

## 2021-01-14 RX ORDER — RANOLAZINE 500 MG/1
TABLET, EXTENDED RELEASE ORAL
Qty: 60 TABLET | Refills: 4 | Status: SHIPPED | OUTPATIENT
Start: 2021-01-14

## 2021-03-15 ENCOUNTER — TRANSCRIBE ORDERS (OUTPATIENT)
Dept: ADMINISTRATIVE | Facility: HOSPITAL | Age: 81
End: 2021-03-15

## 2021-03-15 DIAGNOSIS — R60.9 SWELLING: Primary | ICD-10-CM

## 2021-03-24 ENCOUNTER — OFFICE VISIT (OUTPATIENT)
Dept: CARDIOLOGY | Facility: CLINIC | Age: 81
End: 2021-03-24

## 2021-03-24 VITALS
OXYGEN SATURATION: 95 % | HEART RATE: 68 BPM | WEIGHT: 225 LBS | BODY MASS INDEX: 32.28 KG/M2 | SYSTOLIC BLOOD PRESSURE: 140 MMHG | DIASTOLIC BLOOD PRESSURE: 82 MMHG

## 2021-03-24 DIAGNOSIS — E78.5 HYPERLIPIDEMIA LDL GOAL <70: Chronic | ICD-10-CM

## 2021-03-24 DIAGNOSIS — R07.9 CHEST PAIN, UNSPECIFIED TYPE: ICD-10-CM

## 2021-03-24 DIAGNOSIS — I20.9 ANGINA PECTORIS (HCC): Chronic | ICD-10-CM

## 2021-03-24 DIAGNOSIS — I25.118 CORONARY ARTERY DISEASE OF NATIVE ARTERY OF NATIVE HEART WITH STABLE ANGINA PECTORIS (HCC): Primary | Chronic | ICD-10-CM

## 2021-03-24 DIAGNOSIS — I10 ESSENTIAL HYPERTENSION: Chronic | ICD-10-CM

## 2021-03-24 PROCEDURE — 93000 ELECTROCARDIOGRAM COMPLETE: CPT | Performed by: INTERNAL MEDICINE

## 2021-03-24 PROCEDURE — 99214 OFFICE O/P EST MOD 30 MIN: CPT | Performed by: INTERNAL MEDICINE

## 2021-03-24 NOTE — PROGRESS NOTES
Cardiology Office Visit      Encounter Date:  03/24/2021    Patient ID:   Darrick Andrade is a 81 y.o. male.      Reason For Followup:  Coronary artery disease  Hypertension  Hyperlipidemia    Brief Clinical History:  Dear Emily Etienne APRN    I had the pleasure of seeing Darrick Adnrade today. As you are well aware, this is a 81 y.o. male with past medical history that is significant for history of  hypertension hyperlipidemia, currently on maximal medical therapy including aspirin statin beta-blocker and Imdur continued to have recurrent episodes of chest discomfort of the new suspicious for anginal equivalent.     Echocardiogram with normal LV systolic function  Patient cardiac catheterization showed severe 2 vessel coronary artery disease involving LAD and ostium of the right coronary artery/ Likely LAD is a culprit lesion for patient's symptoms of angina    Interval History:  Patient denies any further symptoms of chest discomfort  Able to exercise without any significant limitation  Patient denies any new cardiac complaints  No further GI bleeding  Hospitalization in May with GI bleeding  Assessment & Plan    Impressions:  Coronary artery disease  Stable angina  Hypertension  Hyperlipidemia  Nonobstructing distal esophageal stricture  3 cm hiatal hernia  Prepyloric ulcer without bleeding  Duodenal ulcer without bleeding    Recommendations:  Lad lesion is somewhat risky in terms of intervention plan to treat him medically at this time   patient likes to try conservative therapy at this time   continue Ranexa and close monitoring  If the patient continues to be symptomatic will consider high-risk PCI to the LAD   need for close monitoring and follow-up discussed with the patient   cath films reviewed with the patient  Patient denies any further symptoms of chest discomfort with Ranexa  Continue Ranexa and maximal medical therapy for now  If patient fails medical therapy consider PCI and  stenting of the mid LAD at the diagonal bifurcation  Stable from cardiac standpoint  Follow-up with primary care physician for labs  Follow-up in office in 6 months    Objective:    Vitals:  Vitals:    03/24/21 1139   BP: 140/82   Pulse: 68   SpO2: 95%   Weight: 102 kg (225 lb)       Physical Exam:    General: Alert, cooperative, no distress, appears stated age  Head:  Normocephalic, atraumatic, mucous membranes moist  Eyes:  Conjunctiva/corneas clear, EOM's intact     Neck:  Supple,  no adenopathy;      Lungs: Clear to auscultation bilaterally, no wheezes rhonchi rales are noted  Chest wall: No tenderness  Heart::  Regular rate and rhythm, S1 and S2 normal, no murmur, rub or gallop  Abdomen: Soft, non-tender, nondistended bowel sounds active  Extremities: No cyanosis, clubbing, or edema  Pulses: 2+ and symmetric all extremities  Skin:  No rashes or lesions  Neuro/psych: A&O x3. CN II through XII are grossly intact with appropriate affect      Allergies:  No Known Allergies    Medication Review:     Current Outpatient Medications:   •  amLODIPine (NORVASC) 2.5 MG tablet, Take 2.5 mg by mouth Every Night., Disp: , Rfl:   •  DULoxetine HCl (DRIZALMA) 20 MG capsule, Take 20 mg by mouth Daily., Disp: , Rfl:   •  ferrous sulfate 325 (65 FE) MG tablet, Take 325 mg by mouth Daily With Breakfast., Disp: , Rfl:   •  isosorbide mononitrate (IMDUR) 60 MG 24 hr tablet, Take 60 mg by mouth Daily., Disp: , Rfl:   •  levothyroxine (SYNTHROID, LEVOTHROID) 50 MCG tablet, Take 50 mcg by mouth Daily., Disp: , Rfl:   •  losartan (COZAAR) 25 MG tablet, Take 12.5 mg by mouth Every Night., Disp: , Rfl:   •  metoprolol tartrate (LOPRESSOR) 100 MG tablet, Take 100 mg by mouth 2 (Two) Times a Day., Disp: , Rfl:   •  pantoprazole (PROTONIX) 40 MG EC tablet, Take 1 tablet by mouth 2 (Two) Times a Day Before Meals., Disp: 60 tablet, Rfl: 2  •  ranolazine (RANEXA) 500 MG 12 hr tablet, TAKE ONE TABLET BY MOUTH TWICE A DAY, Disp: 60 tablet, Rfl:  4  •  rosuvastatin (CRESTOR) 20 MG tablet, Take 20 mg by mouth Every Night., Disp: , Rfl:     Family History:  Family History   Problem Relation Age of Onset   • Hyperlipidemia Mother    • Hyperlipidemia Father    • Brain cancer Father        Past Medical History:  Past Medical History:   Diagnosis Date   • Coronary artery disease of native artery of native heart with stable angina pectoris (CMS/HCC) 7/15/2019   • Essential hypertension 7/15/2019   • Glaucoma    • Hyperlipidemia    • Hyperlipidemia LDL goal <70 7/15/2019   • Hypertension    • Hypothyroidism    • Skin cancer        Past surgical History:  Past Surgical History:   Procedure Laterality Date   • CARDIAC CATHETERIZATION  04/26/2019    No stents placed   • ENDOSCOPY N/A 5/10/2020    Procedure: ESOPHAGOGASTRODUODENOSCOPY  WITH BIOPSY X 1 AREA;  Surgeon: Chava Wood MD;  Location: [facility] ENDOSCOPY;  Service: Gastroenterology;  Laterality: N/A;  POST OP: DUODENAL ULCER, GASTRIC ULCER, HITAL HERNIA, ESOPHAGEAL STRICTURE   • HERNIA REPAIR     • OTHER SURGICAL HISTORY      lump on back of head removed   • TOE SURGERY         Social History:  Social History     Socioeconomic History   • Marital status:      Spouse name: Not on file   • Number of children: Not on file   • Years of education: Not on file   • Highest education level: Not on file   Tobacco Use   • Smoking status: Former Smoker   • Smokeless tobacco: Never Used   Vaping Use   • Vaping Use: Never used   Substance and Sexual Activity   • Alcohol use: No   • Drug use: No   • Sexual activity: Defer       Review of Systems:  The following systems were reviewed as they relate to the cardiovascular system: Constitutional, Eyes, ENT, Cardiovascular, Respiratory, Gastrointestinal, Integumentary, Neurological, Psychiatric, Hematologic, Endocrine, Musculoskeletal, and Genitourinary. The pertinent cardiovascular findings are reported above with all other cardiovascular points within those  systems being negative.    Diagnostic Study Review:     Current Electrocardiogram:    ECG 12 Lead    Date/Time: 3/24/2021 12:04 PM  Performed by: Hien Parisi MD  Authorized by: Hien Parisi MD   Comparison: compared with previous ECG   Similar to previous ECG  Rhythm: sinus rhythm  Ectopy: atrial premature contractions  Rate: normal  BPM: 68  Conduction: conduction normal  QRS axis: normal  Other findings: non-specific ST-T wave changes    Clinical impression: abnormal EKG              NOTE: The following portions of the patient's history were reviewed and updated this visit as appropriate: allergies, current medications, past family history, past medical history, past social history, past surgical history and problem list.

## 2021-03-25 ENCOUNTER — HOSPITAL ENCOUNTER (OUTPATIENT)
Dept: CARDIOLOGY | Facility: HOSPITAL | Age: 81
Discharge: HOME | End: 2021-03-25
Admitting: NURSE PRACTITIONER

## 2021-03-25 DIAGNOSIS — R60.9 SWELLING: ICD-10-CM

## 2021-03-25 LAB

## 2021-03-25 PROCEDURE — 93970 EXTREMITY STUDY: CPT

## 2021-04-26 ENCOUNTER — HOSPITAL ENCOUNTER (OUTPATIENT)
Dept: GENERAL RADIOLOGY | Facility: HOSPITAL | Age: 81
Discharge: HOME | End: 2021-04-26
Admitting: NURSE PRACTITIONER

## 2021-04-26 ENCOUNTER — TRANSCRIBE ORDERS (OUTPATIENT)
Dept: ADMINISTRATIVE | Facility: HOSPITAL | Age: 81
End: 2021-04-26

## 2021-04-26 DIAGNOSIS — M25.461 PAIN AND SWELLING OF RIGHT KNEE: Primary | ICD-10-CM

## 2021-04-26 DIAGNOSIS — M25.561 PAIN AND SWELLING OF RIGHT KNEE: ICD-10-CM

## 2021-04-26 DIAGNOSIS — M25.461 PAIN AND SWELLING OF RIGHT KNEE: ICD-10-CM

## 2021-04-26 DIAGNOSIS — M25.561 PAIN AND SWELLING OF RIGHT KNEE: Primary | ICD-10-CM

## 2021-04-26 PROCEDURE — 73564 X-RAY EXAM KNEE 4 OR MORE: CPT

## 2021-06-17 ENCOUNTER — TELEPHONE (OUTPATIENT)
Dept: CARDIOLOGY | Facility: CLINIC | Age: 81
End: 2021-06-17

## 2021-06-17 NOTE — TELEPHONE ENCOUNTER
Pt called stating that he is having weight gain and swelling in his ankles. He thinks the Ranexa may be causing this. Per Dr. Massey okay to stop Ranexa and call back if swelling in the ankles continue and we may start him on water pill. The Pt stated understanding and is agreeable to this plan.

## 2021-06-30 ENCOUNTER — TRANSCRIBE ORDERS (OUTPATIENT)
Dept: ADMINISTRATIVE | Facility: HOSPITAL | Age: 81
End: 2021-06-30

## 2021-06-30 DIAGNOSIS — I25.10 CAD IN NATIVE ARTERY: Primary | ICD-10-CM

## 2021-07-21 ENCOUNTER — HOSPITAL ENCOUNTER (OUTPATIENT)
Dept: NUCLEAR MEDICINE | Facility: HOSPITAL | Age: 81
Discharge: HOME | End: 2021-07-21

## 2021-07-21 DIAGNOSIS — I25.10 CAD IN NATIVE ARTERY: ICD-10-CM

## 2021-07-21 PROCEDURE — 0 TECHNETIUM SESTAMIBI: Performed by: NURSE PRACTITIONER

## 2021-07-21 PROCEDURE — A9500 TC99M SESTAMIBI: HCPCS | Performed by: NURSE PRACTITIONER

## 2021-07-21 PROCEDURE — 78452 HT MUSCLE IMAGE SPECT MULT: CPT | Performed by: INTERNAL MEDICINE

## 2021-07-21 PROCEDURE — 93016 CV STRESS TEST SUPVJ ONLY: CPT | Performed by: INTERNAL MEDICINE

## 2021-07-21 PROCEDURE — 93018 CV STRESS TEST I&R ONLY: CPT | Performed by: INTERNAL MEDICINE

## 2021-07-21 PROCEDURE — 78452 HT MUSCLE IMAGE SPECT MULT: CPT

## 2021-07-21 PROCEDURE — 25010000002 REGADENOSON 0.4 MG/5ML SOLUTION: Performed by: NURSE PRACTITIONER

## 2021-07-21 PROCEDURE — 93017 CV STRESS TEST TRACING ONLY: CPT

## 2021-07-21 RX ADMIN — REGADENOSON 0.4 MG: 0.08 INJECTION, SOLUTION INTRAVENOUS at 10:45

## 2021-07-21 RX ADMIN — TECHNETIUM TC 99M SESTAMIBI 1 DOSE: 1 INJECTION INTRAVENOUS at 09:10

## 2021-07-21 RX ADMIN — TECHNETIUM TC 99M SESTAMIBI 1 DOSE: 1 INJECTION INTRAVENOUS at 10:45

## 2021-07-22 LAB
BH CV REST NUCLEAR ISOTOPE DOSE: 7.8 MCI
BH CV STRESS BP STAGE 1: NORMAL
BH CV STRESS BP STAGE 2: NORMAL
BH CV STRESS BP STAGE 3: NORMAL
BH CV STRESS BP STAGE 4: NORMAL
BH CV STRESS COMMENTS STAGE 1: NORMAL
BH CV STRESS COMMENTS STAGE 2: NORMAL
BH CV STRESS DOSE REGADENOSON STAGE 1: 0.4
BH CV STRESS DURATION MIN STAGE 1: 0
BH CV STRESS DURATION MIN STAGE 2: 4
BH CV STRESS DURATION SEC STAGE 1: 10
BH CV STRESS DURATION SEC STAGE 2: 0
BH CV STRESS HR STAGE 1: 62
BH CV STRESS HR STAGE 2: 75
BH CV STRESS HR STAGE 3: 81
BH CV STRESS HR STAGE 4: 83
BH CV STRESS NUCLEAR ISOTOPE DOSE: 22 MCI
BH CV STRESS PROTOCOL 1: NORMAL
BH CV STRESS RECOVERY BP: NORMAL MMHG
BH CV STRESS RECOVERY HR: 79 BPM
BH CV STRESS STAGE 1: 1
BH CV STRESS STAGE 2: 2
BH CV STRESS STAGE 3: 3
BH CV STRESS STAGE 4: 4
LV EF NUC BP: 70 %
MAXIMAL PREDICTED HEART RATE: 140 BPM
PERCENT MAX PREDICTED HR: 59.29 %
STRESS BASELINE BP: NORMAL MMHG
STRESS BASELINE HR: 67 BPM
STRESS PERCENT HR: 70 %
STRESS POST PEAK BP: NORMAL MMHG
STRESS POST PEAK HR: 83 BPM
STRESS TARGET HR: 119 BPM

## 2021-09-30 ENCOUNTER — HOSPITAL ENCOUNTER (OUTPATIENT)
Dept: GENERAL RADIOLOGY | Facility: HOSPITAL | Age: 81
Discharge: HOME | End: 2021-09-30
Admitting: NURSE PRACTITIONER

## 2021-09-30 ENCOUNTER — TRANSCRIBE ORDERS (OUTPATIENT)
Dept: ADMINISTRATIVE | Facility: HOSPITAL | Age: 81
End: 2021-09-30

## 2021-09-30 DIAGNOSIS — R05.9 COUGH: ICD-10-CM

## 2021-09-30 DIAGNOSIS — R05.9 COUGH: Primary | ICD-10-CM

## 2021-09-30 PROCEDURE — 71046 X-RAY EXAM CHEST 2 VIEWS: CPT

## 2021-11-03 ENCOUNTER — OFFICE VISIT (OUTPATIENT)
Dept: CARDIOLOGY | Facility: CLINIC | Age: 81
End: 2021-11-03

## 2021-11-03 VITALS
RESPIRATION RATE: 18 BRPM | SYSTOLIC BLOOD PRESSURE: 109 MMHG | HEART RATE: 70 BPM | OXYGEN SATURATION: 97 % | WEIGHT: 220 LBS | DIASTOLIC BLOOD PRESSURE: 65 MMHG | HEIGHT: 71 IN | BODY MASS INDEX: 30.8 KG/M2

## 2021-11-03 DIAGNOSIS — I25.118 CORONARY ARTERY DISEASE OF NATIVE ARTERY OF NATIVE HEART WITH STABLE ANGINA PECTORIS (HCC): Primary | Chronic | ICD-10-CM

## 2021-11-03 DIAGNOSIS — I20.9 ANGINA PECTORIS (HCC): Chronic | ICD-10-CM

## 2021-11-03 DIAGNOSIS — I10 ESSENTIAL HYPERTENSION: Chronic | ICD-10-CM

## 2021-11-03 DIAGNOSIS — E78.5 HYPERLIPIDEMIA LDL GOAL <70: Chronic | ICD-10-CM

## 2021-11-03 PROCEDURE — 93000 ELECTROCARDIOGRAM COMPLETE: CPT | Performed by: INTERNAL MEDICINE

## 2021-11-03 PROCEDURE — 99214 OFFICE O/P EST MOD 30 MIN: CPT | Performed by: INTERNAL MEDICINE

## 2021-11-03 RX ORDER — MECLIZINE HYDROCHLORIDE 25 MG/1
12.5 TABLET ORAL 3 TIMES DAILY PRN
COMMUNITY
Start: 2021-11-02

## 2021-11-03 RX ORDER — ASPIRIN 325 MG
325 TABLET ORAL DAILY
Qty: 30 TABLET | Refills: 11 | Status: ON HOLD | COMMUNITY
End: 2022-01-27

## 2021-11-03 NOTE — PROGRESS NOTES
Cardiology Office Visit      Encounter Date:  11/03/2021    Patient ID:   Darrick Andrade is a 81 y.o. male.    Reason For Followup:  Coronary artery disease  Hypertension  Hyperlipidemia    Brief Clinical History:  Dear Emily Etienne APRN    I had the pleasure of seeing Darrick Andrade today. As you are well aware, this is a 81 y.o. male with past medical history that is significant for history of  hypertension hyperlipidemia, currently on maximal medical therapy including aspirin statin beta-blocker and Imdur continued to have recurrent episodes of chest discomfort of the new suspicious for anginal equivalent.     Echocardiogram with normal LV systolic function  Patient cardiac catheterization showed severe 2 vessel coronary artery disease involving LAD and ostium of the right coronary artery/ Likely LAD is a culprit lesion for patient's symptoms of angina    Interval History:  Patient denies any further symptoms of chest discomfort  Able to exercise without any significant limitation  Patient denies any new cardiac complaints  No further GI bleeding  Hospitalization in May with GI bleeding  Assessment & Plan    Impressions:  Coronary artery disease  Stable angina  Hypertension  Hyperlipidemia  Nonobstructing distal esophageal stricture  3 cm hiatal hernia  Prepyloric ulcer without bleeding  Duodenal ulcer without bleeding    Recommendations:  LAD lesion is somewhat risky in terms of intervention plan to treat him medically at this time   patient likes to try conservative therapy at this time   continue Ranexa and close monitoring  If the patient continues to be symptomatic will consider high-risk PCI to the LAD   need for close monitoring and follow-up discussed with the patient   cath films reviewed with the patient  Patient denies any further symptoms of chest discomfort with Ranexa  Continue Ranexa and maximal medical therapy for now  If patient fails medical therapy consider PCI and stenting  "of the mid LAD at the diagonal bifurcation  Add aspirin 81 mg p.o. once a day with food  Risk benefits and alternatives were aspirin reviewed and discussed with the patient  Stable from cardiac standpoint  Follow-up with primary care physician for labs  Medical records from prior GI work-up reviewed and discussed with patient  Follow-up in office in 6 months  Objective:    Vitals:  Vitals:    11/03/21 1122   BP: 109/65   BP Location: Left arm   Patient Position: Sitting   Cuff Size: Large Adult   Pulse: 70   Resp: 18   SpO2: 97%   Weight: 99.8 kg (220 lb)   Height: 180.3 cm (71\")       Physical Exam:    General: Alert, cooperative, no distress, appears stated age  Head:  Normocephalic, atraumatic, mucous membranes moist  Eyes:  Conjunctiva/corneas clear, EOM's intact     Neck:  Supple,  no adenopathy;      Lungs: Clear to auscultation bilaterally, no wheezes rhonchi rales are noted  Chest wall: No tenderness  Heart::  Regular rate and rhythm, S1 and S2 normal, no murmur, rub or gallop  Abdomen: Soft, non-tender, nondistended bowel sounds active  Extremities: No cyanosis, clubbing, or edema  Pulses: 2+ and symmetric all extremities  Skin:  No rashes or lesions  Neuro/psych: A&O x3. CN II through XII are grossly intact with appropriate affect      Allergies:  No Known Allergies    Medication Review:     Current Outpatient Medications:   •  amLODIPine (NORVASC) 2.5 MG tablet, Take 2.5 mg by mouth Every Night., Disp: , Rfl:   •  DULoxetine HCl (DRIZALMA) 20 MG capsule, Take 20 mg by mouth Daily., Disp: , Rfl:   •  ferrous sulfate 325 (65 FE) MG tablet, Take 325 mg by mouth Daily With Breakfast., Disp: , Rfl:   •  isosorbide mononitrate (IMDUR) 60 MG 24 hr tablet, Take 60 mg by mouth Daily., Disp: , Rfl:   •  levothyroxine (SYNTHROID, LEVOTHROID) 50 MCG tablet, Take 50 mcg by mouth Daily., Disp: , Rfl:   •  losartan (COZAAR) 25 MG tablet, Take 12.5 mg by mouth Every Night., Disp: , Rfl:   •  meclizine (ANTIVERT) 25 MG " tablet, , Disp: , Rfl:   •  metoprolol tartrate (LOPRESSOR) 100 MG tablet, Take 100 mg by mouth 2 (Two) Times a Day., Disp: , Rfl:   •  pantoprazole (PROTONIX) 40 MG EC tablet, Take 1 tablet by mouth 2 (Two) Times a Day Before Meals., Disp: 60 tablet, Rfl: 2  •  ranolazine (RANEXA) 500 MG 12 hr tablet, TAKE ONE TABLET BY MOUTH TWICE A DAY, Disp: 60 tablet, Rfl: 4  •  rosuvastatin (CRESTOR) 20 MG tablet, Take 20 mg by mouth Every Night., Disp: , Rfl:   •  aspirin 325 MG tablet, Take 1 tablet by mouth Daily., Disp: 30 tablet, Rfl: 11    Family History:  Family History   Problem Relation Age of Onset   • Hyperlipidemia Mother    • Hyperlipidemia Father    • Brain cancer Father        Past Medical History:  Past Medical History:   Diagnosis Date   • Coronary artery disease of native artery of native heart with stable angina pectoris (HCC) 7/15/2019   • Essential hypertension 7/15/2019   • Glaucoma    • Hyperlipidemia    • Hyperlipidemia LDL goal <70 7/15/2019   • Hypertension    • Hypothyroidism    • Skin cancer        Past surgical History:  Past Surgical History:   Procedure Laterality Date   • CARDIAC CATHETERIZATION  04/26/2019    No stents placed   • ENDOSCOPY N/A 5/10/2020    Procedure: ESOPHAGOGASTRODUODENOSCOPY  WITH BIOPSY X 1 AREA;  Surgeon: Chava Wood MD;  Location: [facility] ENDOSCOPY;  Service: Gastroenterology;  Laterality: N/A;  POST OP: DUODENAL ULCER, GASTRIC ULCER, HITAL HERNIA, ESOPHAGEAL STRICTURE   • HERNIA REPAIR     • OTHER SURGICAL HISTORY      lump on back of head removed   • TOE SURGERY         Social History:  Social History     Socioeconomic History   • Marital status:    Tobacco Use   • Smoking status: Former Smoker   • Smokeless tobacco: Never Used   Vaping Use   • Vaping Use: Never used   Substance and Sexual Activity   • Alcohol use: No   • Drug use: No   • Sexual activity: Defer       Review of Systems:  The following systems were reviewed as they relate to the  cardiovascular system: Constitutional, Eyes, ENT, Cardiovascular, Respiratory, Gastrointestinal, Integumentary, Neurological, Psychiatric, Hematologic, Endocrine, Musculoskeletal, and Genitourinary. The pertinent cardiovascular findings are reported above with all other cardiovascular points within those systems being negative.    Diagnostic Study Review:     Current Electrocardiogram:    ECG 12 Lead    Date/Time: 11/3/2021 12:29 PM  Performed by: Hien Parisi MD  Authorized by: Hien Parisi MD   Comparison: compared with previous ECG   Similar to previous ECG  Rate: normal  BPM: 67  Conduction: non-specific intraventricular conduction delay  QRS axis: normal  Other findings: non-specific ST-T wave changes    Clinical impression: abnormal EKG              NOTE: The following portions of the patient's history were reviewed and updated this visit as appropriate: allergies, current medications, past family history, past medical history, past social history, past surgical history and problem list.

## 2022-01-27 ENCOUNTER — HOSPITAL ENCOUNTER (OUTPATIENT)
Facility: HOSPITAL | Age: 82
Setting detail: OBSERVATION
LOS: 1 days | Discharge: HOME | End: 2022-01-28
Attending: HOSPITALIST | Admitting: HOSPITALIST

## 2022-01-27 ENCOUNTER — APPOINTMENT (OUTPATIENT)
Dept: GENERAL RADIOLOGY | Facility: HOSPITAL | Age: 82
End: 2022-01-27

## 2022-01-27 DIAGNOSIS — U07.1 COVID: Primary | ICD-10-CM

## 2022-01-27 DIAGNOSIS — R55 SYNCOPE, UNSPECIFIED SYNCOPE TYPE: ICD-10-CM

## 2022-01-27 PROBLEM — J96.01 ACUTE HYPOXEMIC RESPIRATORY FAILURE DUE TO COVID-19: Status: ACTIVE | Noted: 2022-01-27

## 2022-01-27 PROBLEM — I95.9 HYPOTENSION: Status: ACTIVE | Noted: 2022-01-27

## 2022-01-27 PROBLEM — E66.3 OVERWEIGHT (BMI 25.0-29.9): Chronic | Status: ACTIVE | Noted: 2022-01-27

## 2022-01-27 PROBLEM — K92.1 GASTROINTESTINAL HEMORRHAGE WITH MELENA: Status: RESOLVED | Noted: 2020-05-09 | Resolved: 2022-01-27

## 2022-01-27 LAB
ALBUMIN SERPL-MCNC: 3.8 G/DL (ref 3.5–5.2)
ALP SERPL-CCNC: 46 U/L (ref 39–117)
ALT SERPL W P-5'-P-CCNC: 15 U/L (ref 1–41)
ANION GAP SERPL CALCULATED.3IONS-SCNC: 14 MMOL/L (ref 5–15)
APTT PPP: 29.6 SECONDS (ref 24–31)
AST SERPL-CCNC: 25 U/L (ref 1–40)
BASOPHILS # BLD AUTO: 0 10*3/MM3 (ref 0–0.2)
BASOPHILS NFR BLD AUTO: 0.4 % (ref 0–1.5)
BILIRUB CONJ SERPL-MCNC: <0.2 MG/DL (ref 0–0.3)
BILIRUB INDIRECT SERPL-MCNC: NORMAL MG/DL
BILIRUB SERPL-MCNC: 0.5 MG/DL (ref 0–1.2)
BUN SERPL-MCNC: 20 MG/DL (ref 8–23)
BUN/CREAT SERPL: 16.5 (ref 7–25)
CALCIUM SPEC-SCNC: 8.7 MG/DL (ref 8.6–10.5)
CHLORIDE SERPL-SCNC: 99 MMOL/L (ref 98–107)
CK SERPL-CCNC: 217 U/L (ref 20–200)
CO2 SERPL-SCNC: 22 MMOL/L (ref 22–29)
CREAT SERPL-MCNC: 1.21 MG/DL (ref 0.76–1.27)
CRP SERPL-MCNC: 2.75 MG/DL (ref 0–0.5)
D DIMER PPP FEU-MCNC: 0.67 MG/L (FEU) (ref 0–0.59)
DEPRECATED RDW RBC AUTO: 43.8 FL (ref 37–54)
EOSINOPHIL # BLD AUTO: 0 10*3/MM3 (ref 0–0.4)
EOSINOPHIL NFR BLD AUTO: 0.4 % (ref 0.3–6.2)
ERYTHROCYTE [DISTWIDTH] IN BLOOD BY AUTOMATED COUNT: 13.7 % (ref 12.3–15.4)
FERRITIN SERPL-MCNC: 117.2 NG/ML (ref 30–400)
GFR SERPL CREATININE-BSD FRML MDRD: 58 ML/MIN/1.73
GLUCOSE SERPL-MCNC: 115 MG/DL (ref 65–99)
HCT VFR BLD AUTO: 43.4 % (ref 37.5–51)
HGB BLD-MCNC: 14.7 G/DL (ref 13–17.7)
HOLD SPECIMEN: NORMAL
HOLD SPECIMEN: NORMAL
INR PPP: 1.05 (ref 0.93–1.1)
LDH SERPL-CCNC: 242 U/L (ref 135–225)
LYMPHOCYTES # BLD AUTO: 0.7 10*3/MM3 (ref 0.7–3.1)
LYMPHOCYTES NFR BLD AUTO: 13.6 % (ref 19.6–45.3)
MCH RBC QN AUTO: 31.2 PG (ref 26.6–33)
MCHC RBC AUTO-ENTMCNC: 33.9 G/DL (ref 31.5–35.7)
MCV RBC AUTO: 91.9 FL (ref 79–97)
MONOCYTES # BLD AUTO: 0.7 10*3/MM3 (ref 0.1–0.9)
MONOCYTES NFR BLD AUTO: 12.5 % (ref 5–12)
NEUTROPHILS NFR BLD AUTO: 3.8 10*3/MM3 (ref 1.7–7)
NEUTROPHILS NFR BLD AUTO: 73.1 % (ref 42.7–76)
NRBC BLD AUTO-RTO: 0.1 /100 WBC (ref 0–0.2)
NT-PROBNP SERPL-MCNC: 695.5 PG/ML (ref 0–1800)
PLATELET # BLD AUTO: 160 10*3/MM3 (ref 140–450)
PMV BLD AUTO: 7.8 FL (ref 6–12)
POTASSIUM SERPL-SCNC: 4.1 MMOL/L (ref 3.5–5.2)
PROCALCITONIN SERPL-MCNC: 0.08 NG/ML (ref 0–0.25)
PROT SERPL-MCNC: 7 G/DL (ref 6–8.5)
PROTHROMBIN TIME: 11.6 SECONDS (ref 9.6–11.7)
RBC # BLD AUTO: 4.73 10*6/MM3 (ref 4.14–5.8)
SARS-COV-2 RNA PNL SPEC NAA+PROBE: DETECTED
SODIUM SERPL-SCNC: 135 MMOL/L (ref 136–145)
TROPONIN T SERPL-MCNC: <0.01 NG/ML (ref 0–0.03)
WBC NRBC COR # BLD: 5.2 10*3/MM3 (ref 3.4–10.8)
WHOLE BLOOD HOLD SPECIMEN: NORMAL
WHOLE BLOOD HOLD SPECIMEN: NORMAL

## 2022-01-27 PROCEDURE — 84145 PROCALCITONIN (PCT): CPT | Performed by: NURSE PRACTITIONER

## 2022-01-27 PROCEDURE — U0003 INFECTIOUS AGENT DETECTION BY NUCLEIC ACID (DNA OR RNA); SEVERE ACUTE RESPIRATORY SYNDROME CORONAVIRUS 2 (SARS-COV-2) (CORONAVIRUS DISEASE [COVID-19]), AMPLIFIED PROBE TECHNIQUE, MAKING USE OF HIGH THROUGHPUT TECHNOLOGIES AS DESCRIBED BY CMS-2020-01-R: HCPCS | Performed by: NURSE PRACTITIONER

## 2022-01-27 PROCEDURE — 99222 1ST HOSP IP/OBS MODERATE 55: CPT | Performed by: INTERNAL MEDICINE

## 2022-01-27 PROCEDURE — C9803 HOPD COVID-19 SPEC COLLECT: HCPCS

## 2022-01-27 PROCEDURE — 86140 C-REACTIVE PROTEIN: CPT | Performed by: NURSE PRACTITIONER

## 2022-01-27 PROCEDURE — 25010000002 ENOXAPARIN PER 10 MG: Performed by: NURSE PRACTITIONER

## 2022-01-27 PROCEDURE — 84484 ASSAY OF TROPONIN QUANT: CPT | Performed by: NURSE PRACTITIONER

## 2022-01-27 PROCEDURE — 85379 FIBRIN DEGRADATION QUANT: CPT | Performed by: NURSE PRACTITIONER

## 2022-01-27 PROCEDURE — 85610 PROTHROMBIN TIME: CPT | Performed by: NURSE PRACTITIONER

## 2022-01-27 PROCEDURE — 83880 ASSAY OF NATRIURETIC PEPTIDE: CPT | Performed by: NURSE PRACTITIONER

## 2022-01-27 PROCEDURE — G0378 HOSPITAL OBSERVATION PER HR: HCPCS

## 2022-01-27 PROCEDURE — 93005 ELECTROCARDIOGRAM TRACING: CPT

## 2022-01-27 PROCEDURE — 85025 COMPLETE CBC W/AUTO DIFF WBC: CPT | Performed by: NURSE PRACTITIONER

## 2022-01-27 PROCEDURE — 99285 EMERGENCY DEPT VISIT HI MDM: CPT

## 2022-01-27 PROCEDURE — 83615 LACTATE (LD) (LDH) ENZYME: CPT | Performed by: NURSE PRACTITIONER

## 2022-01-27 PROCEDURE — 85730 THROMBOPLASTIN TIME PARTIAL: CPT | Performed by: NURSE PRACTITIONER

## 2022-01-27 PROCEDURE — 80048 BASIC METABOLIC PNL TOTAL CA: CPT | Performed by: NURSE PRACTITIONER

## 2022-01-27 PROCEDURE — 82728 ASSAY OF FERRITIN: CPT | Performed by: NURSE PRACTITIONER

## 2022-01-27 PROCEDURE — 71045 X-RAY EXAM CHEST 1 VIEW: CPT

## 2022-01-27 PROCEDURE — 96372 THER/PROPH/DIAG INJ SC/IM: CPT

## 2022-01-27 PROCEDURE — 80076 HEPATIC FUNCTION PANEL: CPT | Performed by: NURSE PRACTITIONER

## 2022-01-27 PROCEDURE — 99219 PR INITIAL OBSERVATION CARE/DAY 50 MINUTES: CPT | Performed by: NURSE PRACTITIONER

## 2022-01-27 PROCEDURE — 82550 ASSAY OF CK (CPK): CPT | Performed by: NURSE PRACTITIONER

## 2022-01-27 PROCEDURE — 25010000002 REMDESIVIR 100 MG RECONSTITUTED SOLUTION: Performed by: NURSE PRACTITIONER

## 2022-01-27 RX ORDER — AMOXICILLIN 250 MG
2 CAPSULE ORAL 2 TIMES DAILY
Status: DISCONTINUED | OUTPATIENT
Start: 2022-01-27 | End: 2022-01-28 | Stop reason: HOSPADM

## 2022-01-27 RX ORDER — ACETAMINOPHEN 325 MG/1
650 TABLET ORAL EVERY 4 HOURS PRN
Status: DISCONTINUED | OUTPATIENT
Start: 2022-01-27 | End: 2022-01-28 | Stop reason: HOSPADM

## 2022-01-27 RX ORDER — MAGNESIUM SULFATE HEPTAHYDRATE 40 MG/ML
2 INJECTION, SOLUTION INTRAVENOUS AS NEEDED
Status: DISCONTINUED | OUTPATIENT
Start: 2022-01-27 | End: 2022-01-28 | Stop reason: HOSPADM

## 2022-01-27 RX ORDER — KETOROLAC TROMETHAMINE 30 MG/ML
15 INJECTION, SOLUTION INTRAMUSCULAR; INTRAVENOUS EVERY 6 HOURS PRN
Status: DISCONTINUED | OUTPATIENT
Start: 2022-01-27 | End: 2022-01-28 | Stop reason: HOSPADM

## 2022-01-27 RX ORDER — POLYETHYLENE GLYCOL 3350 17 G/17G
17 POWDER, FOR SOLUTION ORAL DAILY PRN
Status: DISCONTINUED | OUTPATIENT
Start: 2022-01-27 | End: 2022-01-28 | Stop reason: HOSPADM

## 2022-01-27 RX ORDER — BISACODYL 5 MG/1
5 TABLET, DELAYED RELEASE ORAL DAILY PRN
Status: DISCONTINUED | OUTPATIENT
Start: 2022-01-27 | End: 2022-01-28 | Stop reason: HOSPADM

## 2022-01-27 RX ORDER — ACETAMINOPHEN 650 MG/1
650 SUPPOSITORY RECTAL EVERY 4 HOURS PRN
Status: DISCONTINUED | OUTPATIENT
Start: 2022-01-27 | End: 2022-01-28 | Stop reason: HOSPADM

## 2022-01-27 RX ORDER — SODIUM CHLORIDE 9 MG/ML
100 INJECTION, SOLUTION INTRAVENOUS CONTINUOUS
Status: DISCONTINUED | OUTPATIENT
Start: 2022-01-27 | End: 2022-01-28 | Stop reason: HOSPADM

## 2022-01-27 RX ORDER — MAGNESIUM SULFATE 1 G/100ML
1 INJECTION INTRAVENOUS AS NEEDED
Status: DISCONTINUED | OUTPATIENT
Start: 2022-01-27 | End: 2022-01-28 | Stop reason: HOSPADM

## 2022-01-27 RX ORDER — SODIUM CHLORIDE 0.9 % (FLUSH) 0.9 %
10 SYRINGE (ML) INJECTION EVERY 12 HOURS SCHEDULED
Status: DISCONTINUED | OUTPATIENT
Start: 2022-01-27 | End: 2022-01-28 | Stop reason: HOSPADM

## 2022-01-27 RX ORDER — BENZONATATE 100 MG/1
100 CAPSULE ORAL 3 TIMES DAILY PRN
Status: DISCONTINUED | OUTPATIENT
Start: 2022-01-27 | End: 2022-01-28 | Stop reason: HOSPADM

## 2022-01-27 RX ORDER — DEXAMETHASONE SODIUM PHOSPHATE 4 MG/ML
6 INJECTION, SOLUTION INTRA-ARTICULAR; INTRALESIONAL; INTRAMUSCULAR; INTRAVENOUS; SOFT TISSUE DAILY
Status: DISCONTINUED | OUTPATIENT
Start: 2022-01-27 | End: 2022-01-28 | Stop reason: HOSPADM

## 2022-01-27 RX ORDER — ONDANSETRON 2 MG/ML
4 INJECTION INTRAMUSCULAR; INTRAVENOUS EVERY 6 HOURS PRN
Status: DISCONTINUED | OUTPATIENT
Start: 2022-01-27 | End: 2022-01-28 | Stop reason: HOSPADM

## 2022-01-27 RX ORDER — SODIUM CHLORIDE 0.9 % (FLUSH) 0.9 %
10 SYRINGE (ML) INJECTION AS NEEDED
Status: DISCONTINUED | OUTPATIENT
Start: 2022-01-27 | End: 2022-01-28 | Stop reason: HOSPADM

## 2022-01-27 RX ORDER — POTASSIUM CHLORIDE 1.5 G/1.77G
40 POWDER, FOR SOLUTION ORAL AS NEEDED
Status: DISCONTINUED | OUTPATIENT
Start: 2022-01-27 | End: 2022-01-28 | Stop reason: HOSPADM

## 2022-01-27 RX ORDER — NITROGLYCERIN 0.4 MG/1
0.4 TABLET SUBLINGUAL
Status: DISCONTINUED | OUTPATIENT
Start: 2022-01-27 | End: 2022-01-28 | Stop reason: HOSPADM

## 2022-01-27 RX ORDER — CHOLECALCIFEROL (VITAMIN D3) 125 MCG
5 CAPSULE ORAL NIGHTLY PRN
Status: DISCONTINUED | OUTPATIENT
Start: 2022-01-27 | End: 2022-01-28 | Stop reason: HOSPADM

## 2022-01-27 RX ORDER — ONDANSETRON 4 MG/1
4 TABLET, FILM COATED ORAL EVERY 6 HOURS PRN
Status: DISCONTINUED | OUTPATIENT
Start: 2022-01-27 | End: 2022-01-28 | Stop reason: HOSPADM

## 2022-01-27 RX ORDER — POTASSIUM CHLORIDE 20 MEQ/1
40 TABLET, EXTENDED RELEASE ORAL AS NEEDED
Status: DISCONTINUED | OUTPATIENT
Start: 2022-01-27 | End: 2022-01-28 | Stop reason: HOSPADM

## 2022-01-27 RX ORDER — ALUMINA, MAGNESIA, AND SIMETHICONE 2400; 2400; 240 MG/30ML; MG/30ML; MG/30ML
15 SUSPENSION ORAL EVERY 6 HOURS PRN
Status: DISCONTINUED | OUTPATIENT
Start: 2022-01-27 | End: 2022-01-28 | Stop reason: HOSPADM

## 2022-01-27 RX ORDER — ALBUTEROL SULFATE 90 UG/1
2 AEROSOL, METERED RESPIRATORY (INHALATION) EVERY 6 HOURS PRN
Status: DISCONTINUED | OUTPATIENT
Start: 2022-01-27 | End: 2022-01-28 | Stop reason: HOSPADM

## 2022-01-27 RX ORDER — DEXAMETHASONE 6 MG/1
6 TABLET ORAL DAILY
Status: DISCONTINUED | OUTPATIENT
Start: 2022-01-27 | End: 2022-01-28 | Stop reason: HOSPADM

## 2022-01-27 RX ORDER — ACETAMINOPHEN 160 MG/5ML
650 SOLUTION ORAL EVERY 4 HOURS PRN
Status: DISCONTINUED | OUTPATIENT
Start: 2022-01-27 | End: 2022-01-28 | Stop reason: HOSPADM

## 2022-01-27 RX ORDER — BISACODYL 10 MG
10 SUPPOSITORY, RECTAL RECTAL DAILY PRN
Status: DISCONTINUED | OUTPATIENT
Start: 2022-01-27 | End: 2022-01-28 | Stop reason: HOSPADM

## 2022-01-27 RX ADMIN — SODIUM CHLORIDE 1000 ML: 9 INJECTION, SOLUTION INTRAVENOUS at 11:22

## 2022-01-27 RX ADMIN — REMDESIVIR 200 MG: 100 INJECTION, POWDER, LYOPHILIZED, FOR SOLUTION INTRAVENOUS at 16:10

## 2022-01-27 RX ADMIN — SODIUM CHLORIDE 100 ML/HR: 9 INJECTION, SOLUTION INTRAVENOUS at 16:10

## 2022-01-27 RX ADMIN — DEXAMETHASONE 6 MG: 6 TABLET ORAL at 16:11

## 2022-01-27 RX ADMIN — SODIUM CHLORIDE, PRESERVATIVE FREE 10 ML: 5 INJECTION INTRAVENOUS at 16:11

## 2022-01-27 RX ADMIN — SODIUM CHLORIDE, PRESERVATIVE FREE 10 ML: 5 INJECTION INTRAVENOUS at 21:07

## 2022-01-27 RX ADMIN — ENOXAPARIN SODIUM 40 MG: 40 INJECTION SUBCUTANEOUS at 16:11

## 2022-01-28 ENCOUNTER — READMISSION MANAGEMENT (OUTPATIENT)
Dept: CALL CENTER | Facility: HOSPITAL | Age: 82
End: 2022-01-28

## 2022-01-28 ENCOUNTER — APPOINTMENT (OUTPATIENT)
Dept: CARDIOLOGY | Facility: HOSPITAL | Age: 82
End: 2022-01-28

## 2022-01-28 VITALS
DIASTOLIC BLOOD PRESSURE: 100 MMHG | WEIGHT: 218.26 LBS | TEMPERATURE: 97.3 F | SYSTOLIC BLOOD PRESSURE: 170 MMHG | RESPIRATION RATE: 16 BRPM | BODY MASS INDEX: 29.56 KG/M2 | OXYGEN SATURATION: 94 % | HEIGHT: 72 IN | HEART RATE: 106 BPM

## 2022-01-28 PROBLEM — J96.01 ACUTE HYPOXEMIC RESPIRATORY FAILURE DUE TO COVID-19: Status: RESOLVED | Noted: 2022-01-27 | Resolved: 2022-01-28

## 2022-01-28 PROBLEM — U07.1 COVID: Status: ACTIVE | Noted: 2022-01-28

## 2022-01-28 PROBLEM — U07.1 ACUTE HYPOXEMIC RESPIRATORY FAILURE DUE TO COVID-19: Status: RESOLVED | Noted: 2022-01-27 | Resolved: 2022-01-28

## 2022-01-28 PROBLEM — I95.9 HYPOTENSION: Status: RESOLVED | Noted: 2022-01-27 | Resolved: 2022-01-28

## 2022-01-28 PROBLEM — R55 SYNCOPE: Status: RESOLVED | Noted: 2022-01-27 | Resolved: 2022-01-28

## 2022-01-28 PROBLEM — D89.831 CYTOKINE RELEASE SYNDROME, GRADE 1: Status: ACTIVE | Noted: 2022-01-28

## 2022-01-28 LAB
ALBUMIN SERPL-MCNC: 3.8 G/DL (ref 3.5–5.2)
ALBUMIN/GLOB SERPL: 1.1 G/DL
ALP SERPL-CCNC: 51 U/L (ref 39–117)
ALT SERPL W P-5'-P-CCNC: 17 U/L (ref 1–41)
ANION GAP SERPL CALCULATED.3IONS-SCNC: 12 MMOL/L (ref 5–15)
AST SERPL-CCNC: 27 U/L (ref 1–40)
BASOPHILS # BLD AUTO: 0 10*3/MM3 (ref 0–0.2)
BASOPHILS NFR BLD AUTO: 0.2 % (ref 0–1.5)
BH CV XLRA MEAS LEFT CCA RATIO VEL: 111 CM/SEC
BH CV XLRA MEAS LEFT DIST CCA EDV: 17.2 CM/SEC
BH CV XLRA MEAS LEFT DIST CCA PSV: 111 CM/SEC
BH CV XLRA MEAS LEFT DIST ICA EDV: -15.9 CM/SEC
BH CV XLRA MEAS LEFT DIST ICA PSV: -60.9 CM/SEC
BH CV XLRA MEAS LEFT ICA RATIO VEL: -92.5 CM/SEC
BH CV XLRA MEAS LEFT ICA/CCA RATIO: -0.83
BH CV XLRA MEAS LEFT PROX CCA EDV: 14.9 CM/SEC
BH CV XLRA MEAS LEFT PROX CCA PSV: 109 CM/SEC
BH CV XLRA MEAS LEFT PROX ECA PSV: -98.8 CM/SEC
BH CV XLRA MEAS LEFT PROX ICA EDV: -18 CM/SEC
BH CV XLRA MEAS LEFT PROX ICA PSV: -92.5 CM/SEC
BH CV XLRA MEAS LEFT PROX SCLA PSV: 135 CM/SEC
BH CV XLRA MEAS LEFT VERTEBRAL A PSV: 57.6 CM/SEC
BH CV XLRA MEAS RIGHT CCA RATIO VEL: 95.1 CM/SEC
BH CV XLRA MEAS RIGHT DIST CCA EDV: -16.2 CM/SEC
BH CV XLRA MEAS RIGHT DIST CCA PSV: -82 CM/SEC
BH CV XLRA MEAS RIGHT DIST ICA EDV: -9.9 CM/SEC
BH CV XLRA MEAS RIGHT DIST ICA PSV: -45.5 CM/SEC
BH CV XLRA MEAS RIGHT ICA RATIO VEL: -100 CM/SEC
BH CV XLRA MEAS RIGHT ICA/CCA RATIO: -1.1
BH CV XLRA MEAS RIGHT PROX CCA EDV: 14.9 CM/SEC
BH CV XLRA MEAS RIGHT PROX CCA PSV: 95.1 CM/SEC
BH CV XLRA MEAS RIGHT PROX ECA PSV: -110 CM/SEC
BH CV XLRA MEAS RIGHT PROX ICA EDV: -20.9 CM/SEC
BH CV XLRA MEAS RIGHT PROX ICA PSV: -100 CM/SEC
BH CV XLRA MEAS RIGHT PROX SCLA PSV: 151 CM/SEC
BH CV XLRA MEAS RIGHT VERTEBRAL A PSV: 57.1 CM/SEC
BILIRUB SERPL-MCNC: 0.4 MG/DL (ref 0–1.2)
BUN SERPL-MCNC: 20 MG/DL (ref 8–23)
BUN/CREAT SERPL: 19.4 (ref 7–25)
CALCIUM SPEC-SCNC: 8.7 MG/DL (ref 8.6–10.5)
CHLORIDE SERPL-SCNC: 101 MMOL/L (ref 98–107)
CO2 SERPL-SCNC: 22 MMOL/L (ref 22–29)
CREAT SERPL-MCNC: 1.03 MG/DL (ref 0.76–1.27)
CRP SERPL-MCNC: 2.74 MG/DL (ref 0–0.5)
DEPRECATED RDW RBC AUTO: 44.6 FL (ref 37–54)
EOSINOPHIL # BLD AUTO: 0 10*3/MM3 (ref 0–0.4)
EOSINOPHIL NFR BLD AUTO: 0 % (ref 0.3–6.2)
ERYTHROCYTE [DISTWIDTH] IN BLOOD BY AUTOMATED COUNT: 13.9 % (ref 12.3–15.4)
GFR SERPL CREATININE-BSD FRML MDRD: 69 ML/MIN/1.73
GLOBULIN UR ELPH-MCNC: 3.6 GM/DL
GLUCOSE SERPL-MCNC: 150 MG/DL (ref 65–99)
HCT VFR BLD AUTO: 46.1 % (ref 37.5–51)
HGB BLD-MCNC: 15.6 G/DL (ref 13–17.7)
LYMPHOCYTES # BLD AUTO: 1 10*3/MM3 (ref 0.7–3.1)
LYMPHOCYTES NFR BLD AUTO: 18.7 % (ref 19.6–45.3)
MAGNESIUM SERPL-MCNC: 1.9 MG/DL (ref 1.6–2.4)
MAXIMAL PREDICTED HEART RATE: 139 BPM
MCH RBC QN AUTO: 30.7 PG (ref 26.6–33)
MCHC RBC AUTO-ENTMCNC: 33.9 G/DL (ref 31.5–35.7)
MCV RBC AUTO: 90.7 FL (ref 79–97)
MONOCYTES # BLD AUTO: 0.2 10*3/MM3 (ref 0.1–0.9)
MONOCYTES NFR BLD AUTO: 3.3 % (ref 5–12)
NEUTROPHILS NFR BLD AUTO: 4.2 10*3/MM3 (ref 1.7–7)
NEUTROPHILS NFR BLD AUTO: 77.8 % (ref 42.7–76)
NRBC BLD AUTO-RTO: 0.1 /100 WBC (ref 0–0.2)
PLATELET # BLD AUTO: 165 10*3/MM3 (ref 140–450)
PMV BLD AUTO: 8.1 FL (ref 6–12)
POTASSIUM SERPL-SCNC: 4 MMOL/L (ref 3.5–5.2)
PROT SERPL-MCNC: 7.4 G/DL (ref 6–8.5)
QT INTERVAL: 442 MS
RBC # BLD AUTO: 5.08 10*6/MM3 (ref 4.14–5.8)
SODIUM SERPL-SCNC: 135 MMOL/L (ref 136–145)
STRESS TARGET HR: 118 BPM
WBC NRBC COR # BLD: 5.3 10*3/MM3 (ref 3.4–10.8)

## 2022-01-28 PROCEDURE — 85025 COMPLETE CBC W/AUTO DIFF WBC: CPT | Performed by: NURSE PRACTITIONER

## 2022-01-28 PROCEDURE — 93880 EXTRACRANIAL BILAT STUDY: CPT

## 2022-01-28 PROCEDURE — 99213 OFFICE O/P EST LOW 20 MIN: CPT | Performed by: NURSE PRACTITIONER

## 2022-01-28 PROCEDURE — G0378 HOSPITAL OBSERVATION PER HR: HCPCS

## 2022-01-28 PROCEDURE — 36415 COLL VENOUS BLD VENIPUNCTURE: CPT | Performed by: NURSE PRACTITIONER

## 2022-01-28 PROCEDURE — 83735 ASSAY OF MAGNESIUM: CPT | Performed by: NURSE PRACTITIONER

## 2022-01-28 PROCEDURE — 86140 C-REACTIVE PROTEIN: CPT | Performed by: NURSE PRACTITIONER

## 2022-01-28 PROCEDURE — 99217 PR OBSERVATION CARE DISCHARGE MANAGEMENT: CPT | Performed by: HOSPITALIST

## 2022-01-28 PROCEDURE — 80053 COMPREHEN METABOLIC PANEL: CPT | Performed by: NURSE PRACTITIONER

## 2022-01-28 RX ORDER — PANTOPRAZOLE SODIUM 40 MG/1
40 TABLET, DELAYED RELEASE ORAL
Status: DISCONTINUED | OUTPATIENT
Start: 2022-01-28 | End: 2022-01-28 | Stop reason: HOSPADM

## 2022-01-28 RX ORDER — METOPROLOL TARTRATE 50 MG/1
100 TABLET, FILM COATED ORAL 2 TIMES DAILY
Status: DISCONTINUED | OUTPATIENT
Start: 2022-01-28 | End: 2022-01-28 | Stop reason: HOSPADM

## 2022-01-28 RX ORDER — LOSARTAN POTASSIUM 25 MG/1
12.5 TABLET ORAL NIGHTLY
Status: DISCONTINUED | OUTPATIENT
Start: 2022-01-28 | End: 2022-01-28 | Stop reason: HOSPADM

## 2022-01-28 RX ORDER — ROSUVASTATIN CALCIUM 10 MG/1
20 TABLET, COATED ORAL NIGHTLY
Status: DISCONTINUED | OUTPATIENT
Start: 2022-01-28 | End: 2022-01-28 | Stop reason: HOSPADM

## 2022-01-28 RX ORDER — LEVOTHYROXINE SODIUM 0.05 MG/1
50 TABLET ORAL
Status: DISCONTINUED | OUTPATIENT
Start: 2022-01-28 | End: 2022-01-28 | Stop reason: HOSPADM

## 2022-01-28 RX ORDER — MECLIZINE HYDROCHLORIDE 25 MG/1
12.5 TABLET ORAL 3 TIMES DAILY PRN
Status: DISCONTINUED | OUTPATIENT
Start: 2022-01-28 | End: 2022-01-28 | Stop reason: HOSPADM

## 2022-01-28 RX ORDER — ISOSORBIDE MONONITRATE 30 MG/1
30 TABLET, EXTENDED RELEASE ORAL DAILY
Status: DISCONTINUED | OUTPATIENT
Start: 2022-01-28 | End: 2022-01-28 | Stop reason: HOSPADM

## 2022-01-28 RX ORDER — RANOLAZINE 500 MG/1
500 TABLET, EXTENDED RELEASE ORAL 2 TIMES DAILY
Status: DISCONTINUED | OUTPATIENT
Start: 2022-01-28 | End: 2022-01-28 | Stop reason: HOSPADM

## 2022-01-28 RX ADMIN — LEVOTHYROXINE SODIUM 50 MCG: 0.05 TABLET ORAL at 09:27

## 2022-01-28 RX ADMIN — METOPROLOL TARTRATE 100 MG: 50 TABLET, FILM COATED ORAL at 09:27

## 2022-01-28 RX ADMIN — RANOLAZINE 500 MG: 500 TABLET, FILM COATED, EXTENDED RELEASE ORAL at 09:27

## 2022-01-28 RX ADMIN — ISOSORBIDE MONONITRATE 30 MG: 30 TABLET, EXTENDED RELEASE ORAL at 09:27

## 2022-01-28 RX ADMIN — PANTOPRAZOLE SODIUM 40 MG: 40 TABLET, DELAYED RELEASE ORAL at 09:27

## 2022-01-28 RX ADMIN — DOCUSATE SODIUM 50 MG AND SENNOSIDES 8.6 MG 2 TABLET: 8.6; 5 TABLET, FILM COATED ORAL at 09:27

## 2022-01-28 RX ADMIN — DEXAMETHASONE 6 MG: 6 TABLET ORAL at 09:27

## 2022-01-28 RX ADMIN — SODIUM CHLORIDE, PRESERVATIVE FREE 10 ML: 5 INJECTION INTRAVENOUS at 09:27

## 2022-01-29 ENCOUNTER — READMISSION MANAGEMENT (OUTPATIENT)
Dept: CALL CENTER | Facility: HOSPITAL | Age: 82
End: 2022-01-29

## 2022-01-29 NOTE — OUTREACH NOTE
Prep Survey      Responses   [facility]? [facility]   Is LACE score < 7 ? Yes   Emergency Room discharge w/ pulse ox? No   Eligibility Readm Mgmt   Discharge diagnosis acute hypoxic resp failure d/t COVID-19, syncope   Does the patient have one of the following disease processes/diagnoses(primary or secondary)? COVID-19   Does the patient have Home health ordered? No   Is there a DME ordered? No   Prep survey completed? Yes          Libby Zhu RN

## 2022-01-29 NOTE — OUTREACH NOTE
COVID-19 Week 1 Survey      Responses   [facility] patient discharged from? [facility]   Does the patient have one of the following disease processes/diagnoses(primary or secondary)? COVID-19   COVID-19 underlying condition? None   Call Number Call 1   Week 1 Call successful? Yes   Call start time 1253   Call end time 1259   Discharge diagnosis acute hypoxic resp failure d/t COVID-19, syncope   Meds reviewed with patient/caregiver? Yes   Is the patient having any side effects they believe may be caused by any medication additions or changes? No   Does the patient have all medications ordered at discharge? Yes   Is the patient taking all medications as directed (includes completed medication regime)? Yes   Does the patient have a primary care provider?  Yes   Comments regarding PCP Emily Batres-pt has appt. scheduled.   Does the patient have an appointment with their PCP or specialist within 7 days of discharge? Yes   Has the patient kept scheduled appointments due by today? Yes   Psychosocial issues? No   Comments Pt has a wrist BP monitor.     Did the patient receive a copy of their discharge instructions? Yes   Did the patient receive a copy of COVID-19 specific instructions? Yes   Nursing interventions Reviewed instructions with patient   What is the patient's perception of their health status since discharge? Improving   Does the patient have any of the following symptoms? None   Pulse Ox monitoring None   Is the patient/caregiver able to teach back steps to recovery at home? Set small, achievable goals for return to baseline health,  Rest and rebuild strength, gradually increase activity,  Eat a well-balance diet   If the patient is a current smoker, are they able to teach back resources for cessation? Not a smoker   Is the patient/caregiver able to teach back the hierarchy of who to call/visit for symptoms/problems? PCP, Specialist, Home health nurse, Urgent Care, ED, 911 Yes   COVID-19 call completed?  Yes   Wrap up additional comments Pt reports he is doing very well.  He has a good appetite.  Denies any s/sx of Covid today.  He was encouraged to conitnue to monitor temperature, BP daily.            Princess Piña RN

## 2022-01-30 ENCOUNTER — READMISSION MANAGEMENT (OUTPATIENT)
Dept: CALL CENTER | Facility: HOSPITAL | Age: 82
End: 2022-01-30

## 2022-01-30 NOTE — OUTREACH NOTE
COVID-19 Week 1 Survey      Responses   [facility] patient discharged from? [facility]   Does the patient have one of the following disease processes/diagnoses(primary or secondary)? COVID-19   COVID-19 underlying condition? None   Call Number Call 2   Week 1 Call successful? Yes   Call start time 1715   Call end time 1717   Discharge diagnosis acute hypoxic resp failure d/t COVID-19, syncope   Meds reviewed with patient/caregiver? Yes   Is the patient having any side effects they believe may be caused by any medication additions or changes? No   Does the patient have all medications ordered at discharge? Yes   Is the patient taking all medications as directed (includes completed medication regime)? Yes   Does the patient have a primary care provider?  Yes   Does the patient have an appointment with their PCP or specialist within 7 days of discharge? Yes   Has the patient kept scheduled appointments due by today? N/A   Has home health visited the patient within 72 hours of discharge? N/A   Psychosocial issues? No   Did the patient receive a copy of their discharge instructions? Yes   Did the patient receive a copy of COVID-19 specific instructions? Yes   Nursing interventions Reviewed instructions with patient   What is the patient's perception of their health status since discharge? Improving   Does the patient have any of the following symptoms? None   Nursing Interventions Nurse provided patient education   Pulse Ox monitoring None   Is the patient/caregiver able to teach back steps to recovery at home? Set small, achievable goals for return to baseline health,  Rest and rebuild strength, gradually increase activity,  Weigh daily,  Eat a well-balance diet,  Make a list of questions for provider's appointment   If the patient is a current smoker, are they able to teach back resources for cessation? Not a smoker   Is the patient/caregiver able to teach back the hierarchy of who to call/visit for symptoms/problems?  PCP, Specialist, Home health nurse, Urgent Care, ED, 911 Yes   COVID-19 call completed? Yes   Wrap up additional comments BP has been ok he says, he will send a message via [facility] to ask about quarantine.          Loulou Edmonds RN

## 2022-01-31 ENCOUNTER — READMISSION MANAGEMENT (OUTPATIENT)
Dept: CALL CENTER | Facility: HOSPITAL | Age: 82
End: 2022-01-31

## 2022-01-31 NOTE — OUTREACH NOTE
COVID-19 Week 1 Survey      Responses   [facility] patient discharged from? [facility]   Does the patient have one of the following disease processes/diagnoses(primary or secondary)? COVID-19   COVID-19 underlying condition? None   Call Number Call 3   Week 1 Call successful? Yes   Call start time 1334   Call end time 1336   Discharge diagnosis acute hypoxic resp failure d/t COVID-19, syncope   Meds reviewed with patient/caregiver? Yes   Does the patient have all medications ordered at discharge? N/A   Is the patient taking all medications as directed (includes completed medication regime)? Yes   Does the patient have a primary care provider?  Yes   Comments regarding PCP Emily Batres-pt has appt. scheduled.   Does the patient have an appointment with their PCP or specialist within 7 days of discharge? Yes   Has the patient kept scheduled appointments due by today? N/A   Has home health visited the patient within 72 hours of discharge? N/A   Psychosocial issues? No   Did the patient receive a copy of their discharge instructions? Yes   Did the patient receive a copy of COVID-19 specific instructions? Yes   Nursing interventions Reviewed instructions with patient   What is the patient's perception of their health status since discharge? Improving   Does the patient have any of the following symptoms? None   Nursing Interventions Nurse provided patient education   Pulse Ox monitoring None   Is the patient/caregiver able to teach back steps to recovery at home? Set small, achievable goals for return to baseline health,  Rest and rebuild strength, gradually increase activity,  Eat a well-balance diet,  Make a list of questions for provider's appointment,  Practice good oral hygiene   If the patient is a current smoker, are they able to teach back resources for cessation? Not a smoker   Is the patient/caregiver able to teach back the hierarchy of who to call/visit for symptoms/problems? PCP, Specialist, Home health  nurse, Urgent Care, ED, 911 Yes   COVID-19 call completed? Yes          Leanna Ornelas LPN

## 2022-02-03 ENCOUNTER — READMISSION MANAGEMENT (OUTPATIENT)
Dept: CALL CENTER | Facility: HOSPITAL | Age: 82
End: 2022-02-03

## 2022-02-03 NOTE — OUTREACH NOTE
COVID-19 Week 2 Survey      Responses   [facility] patient discharged from? [facility]   Does the patient have one of the following disease processes/diagnoses(primary or secondary)? COVID-19   COVID-19 underlying condition? None   Call Number Call 1   COVID-19 Week 2: Call 1 attempt successful? Yes   Call start time 1044   Call end time 1046   Discharge diagnosis acute hypoxic resp failure d/t COVID-19, syncope   Is the patient taking all medications as directed (includes completed medication regime)? Yes   Does the patient have a primary care provider?  Yes   Comments regarding PCP has an appt with PCP soon   Has the patient kept scheduled appointments due by today? N/A   Has home health visited the patient within 72 hours of discharge? N/A   Psychosocial issues? No   What is the patient's perception of their health status since discharge? Improving   Does the patient have any of the following symptoms? None   Nursing Interventions Nurse provided patient education   Pulse Ox monitoring None   Is the patient/caregiver able to teach back steps to recovery at home? Set small, achievable goals for return to baseline health,  Rest and rebuild strength, gradually increase activity   Is the patient/caregiver able to teach back the hierarchy of who to call/visit for symptoms/problems? PCP, Specialist, Home health nurse, Urgent Care, ED, 911 Yes   COVID-19 call completed? Yes   Wrap up additional comments Says he is doing well, states he tested yesterday and is still positive, advised him he may test positive for awhile, states he will see his PCP soon.          Preeti Sweeney RN

## 2022-02-06 ENCOUNTER — READMISSION MANAGEMENT (OUTPATIENT)
Dept: CALL CENTER | Facility: HOSPITAL | Age: 82
End: 2022-02-06

## 2022-02-06 NOTE — OUTREACH NOTE
COVID-19 Week 2 Survey      Responses   [facility] patient discharged from? [facility]   Does the patient have one of the following disease processes/diagnoses(primary or secondary)? COVID-19   COVID-19 underlying condition? None   Call Number Call 2   COVID-19 Week 2: Call 1 attempt successful? Yes   Call start time 0917   Call end time 0929   Discharge diagnosis acute hypoxic resp failure d/t COVID-19, syncope   Is patient permission given to speak with other caregiver? No   Meds reviewed with patient/caregiver? Yes   Is the patient taking all medications as directed (includes completed medication regime)? Yes   Does the patient have a primary care provider?  Yes   Has the patient kept scheduled appointments due by today? N/A   Has home health visited the patient within 72 hours of discharge? N/A   Psychosocial issues? No   Comments Patient checked B/P this am 124/72.    Did the patient receive a copy of their discharge instructions? Yes   Did the patient receive a copy of COVID-19 specific instructions? Yes   Nursing interventions Reviewed instructions with patient   What is the patient's perception of their health status since discharge? Improving   Does the patient have any of the following symptoms? None   Nursing Interventions Nurse provided patient education   Pulse Ox monitoring Intermittent  [Patient borrowed one. ]   Pulse Ox device source Patient   O2 Sat comments 95% on room air.    O2 Sat: education provided Sat levels   Is the patient/caregiver able to teach back steps to recovery at home? Set small, achievable goals for return to baseline health,  Rest and rebuild strength, gradually increase activity   If the patient is a current smoker, are they able to teach back resources for cessation? Not a smoker   Is the patient/caregiver able to teach back the hierarchy of who to call/visit for symptoms/problems? PCP, Specialist, Home health nurse, Urgent Care, ED, 911 Yes   COVID-19 call completed? Yes    Wrap up additional comments Patient reports that he is doing well. Denies any questions or needs today. He reports that he is going to test again today to see if he still shows positive.           Hanny Bajwa RN

## 2022-04-08 ENCOUNTER — OFFICE VISIT (OUTPATIENT)
Dept: CARDIOLOGY | Facility: CLINIC | Age: 82
End: 2022-04-08

## 2022-04-08 VITALS
RESPIRATION RATE: 18 BRPM | OXYGEN SATURATION: 93 % | DIASTOLIC BLOOD PRESSURE: 69 MMHG | BODY MASS INDEX: 28.71 KG/M2 | HEIGHT: 72 IN | WEIGHT: 212 LBS | SYSTOLIC BLOOD PRESSURE: 116 MMHG | HEART RATE: 55 BPM

## 2022-04-08 DIAGNOSIS — E03.9 ACQUIRED HYPOTHYROIDISM: Chronic | ICD-10-CM

## 2022-04-08 DIAGNOSIS — I25.118 CORONARY ARTERY DISEASE OF NATIVE ARTERY OF NATIVE HEART WITH STABLE ANGINA PECTORIS: Primary | Chronic | ICD-10-CM

## 2022-04-08 DIAGNOSIS — E78.5 HYPERLIPIDEMIA LDL GOAL <70: Chronic | ICD-10-CM

## 2022-04-08 DIAGNOSIS — I20.9 ANGINA PECTORIS: ICD-10-CM

## 2022-04-08 DIAGNOSIS — I10 ESSENTIAL HYPERTENSION: Chronic | ICD-10-CM

## 2022-04-08 PROCEDURE — 99214 OFFICE O/P EST MOD 30 MIN: CPT | Performed by: INTERNAL MEDICINE

## 2022-04-08 NOTE — PROGRESS NOTES
Cardiology Office Visit      Encounter Date:  04/08/2022    Patient ID:   Darrick Andrade is a 82 y.o. male.    Reason For Followup:  Coronary artery disease  Hypertension  Hyperlipidemia  Recent hospitalization with syncope and active COVID-19 infection with improvement of the symptoms    Brief Clinical History:  Dear Dr. Batres, Emily Malone, LALI    I had the pleasure of seeing Darrick Andrade today. As you are well aware, this is a 82 y.o. male with past medical history that is significant for history of  hypertension hyperlipidemia, currently on maximal medical therapy including aspirin statin beta-blocker and Imdur continued to have recurrent episodes of chest discomfort of the new suspicious for anginal equivalent.     Echocardiogram with normal LV systolic function  Patient cardiac catheterization showed severe 2 vessel coronary artery disease involving LAD and ostium of the right coronary artery/ Likely LAD is a culprit lesion for patient's symptoms of angina    Interval History:  Patient denies any further symptoms of chest discomfort  Denies any symptoms of chest pain shortness of breath dizziness or syncope  No further episodes of dizziness or syncope after recent hospitalization  Assessment & Plan    Impressions:  Coronary artery disease  Stable angina  Hypertension  Hyperlipidemia  Nonobstructing distal esophageal stricture  Prior peptic ulcer disease with no recurrence of symptoms  Recent hospitalization with active COVID-19 infection and syncope with no recurrence of symptoms    Recommendations:  LAD lesion is somewhat risky in terms of intervention plan to treat him medically at this time   patient likes to try conservative therapy at this time   continue Ranexa and close monitoring  If the patient continues to be symptomatic will consider high-risk PCI to the LAD   need for close monitoring and follow-up discussed with the patient   cath films reviewed with the patient  Patient denies any  "further symptoms of chest discomfort with Ranexa  Continue Ranexa and maximal medical therapy for now  If patient fails medical therapy consider PCI and stenting of the mid LAD at the diagonal bifurcation  Add aspirin 81 mg p.o. once a day with food  Risk benefits and alternatives were aspirin reviewed and discussed with the patient  Stable from cardiac standpoint  Follow-up with primary care physician for labs  Medical records from recent hospitalization for syncope and COVID-19 infection reviewed and discussed with patient  Continue current medical therapy with isosorbide 60 mg p.o. once a day aspirin 81 mg p.o. once a day Ranexa 500 mg p.o. twice daily metoprolol 100 mg p.o. 2 times a day losartan 25 mg p.o. once a day  Follow-up in office in 6 months    Objective:    Vitals:  Vitals:    04/08/22 1044   BP: 116/69   BP Location: Left arm   Patient Position: Sitting   Cuff Size: Large Adult   Pulse: 55   Resp: 18   SpO2: 93%   Weight: 96.2 kg (212 lb)   Height: 182.9 cm (72\")       Physical Exam:    General: Alert, cooperative, no distress, appears stated age  Head:  Normocephalic, atraumatic, mucous membranes moist  Eyes:  Conjunctiva/corneas clear, EOM's intact     Neck:  Supple,  no adenopathy;      Lungs: Clear to auscultation bilaterally, no wheezes rhonchi rales are noted  Chest wall: No tenderness  Heart::  Regular rate and rhythm, S1 and S2 normal, no murmur, rub or gallop  Abdomen: Soft, non-tender, nondistended bowel sounds active  Extremities: No cyanosis, clubbing, or edema  Pulses: 2+ and symmetric all extremities  Skin:  No rashes or lesions  Neuro/psych: A&O x3. CN II through XII are grossly intact with appropriate affect      Allergies:  No Known Allergies    Medication Review:     Current Outpatient Medications:   •  isosorbide mononitrate (IMDUR) 60 MG 24 hr tablet, Take 30 mg by mouth Daily., Disp: , Rfl:   •  levothyroxine (SYNTHROID, LEVOTHROID) 50 MCG tablet, Take 50 mcg by mouth Daily., " Disp: , Rfl:   •  losartan (COZAAR) 25 MG tablet, Take 12.5 mg by mouth Every Night., Disp: , Rfl:   •  meclizine (ANTIVERT) 25 MG tablet, Take 12.5 mg by mouth 3 (Three) Times a Day As Needed., Disp: , Rfl:   •  metFORMIN (GLUCOPHAGE) 500 MG tablet, , Disp: , Rfl:   •  metoprolol tartrate (LOPRESSOR) 100 MG tablet, Take 100 mg by mouth 2 (Two) Times a Day., Disp: , Rfl:   •  pantoprazole (PROTONIX) 40 MG EC tablet, Take 1 tablet by mouth 2 (Two) Times a Day Before Meals., Disp: 60 tablet, Rfl: 2  •  ranolazine (RANEXA) 500 MG 12 hr tablet, TAKE ONE TABLET BY MOUTH TWICE A DAY, Disp: 60 tablet, Rfl: 4  •  rosuvastatin (CRESTOR) 20 MG tablet, Take 20 mg by mouth Every Night., Disp: , Rfl:     Family History:  Family History   Problem Relation Age of Onset   • Hyperlipidemia Mother    • Hyperlipidemia Father    • Brain cancer Father        Past Medical History:  Past Medical History:   Diagnosis Date   • Basal cell carcinoma of skin 9/22/2014   • Coronary artery disease of native artery of native heart with stable angina pectoris (HCC) 7/15/2019   • COVID-19 vaccination declined    • Essential hypertension 7/15/2019   • Gastrointestinal hemorrhage with melena 5/9/2020   • Glaucoma    • Hyperlipidemia LDL goal <70 7/15/2019   • Hypothyroidism        Past surgical History:  Past Surgical History:   Procedure Laterality Date   • CARDIAC CATHETERIZATION  04/26/2019    No stents placed   • ENDOSCOPY N/A 5/10/2020    Procedure: ESOPHAGOGASTRODUODENOSCOPY  WITH BIOPSY X 1 AREA;  Surgeon: Chava Wood MD;  Location: [facility] ENDOSCOPY;  Service: Gastroenterology;  Laterality: N/A;  POST OP: DUODENAL ULCER, GASTRIC ULCER, HITAL HERNIA, ESOPHAGEAL STRICTURE   • HERNIA REPAIR     • OTHER SURGICAL HISTORY      lump on back of head removed   • TOE SURGERY         Social History:  Social History     Socioeconomic History   • Marital status:    Tobacco Use   • Smoking status: Former Smoker   • Smokeless tobacco:  Never Used   Vaping Use   • Vaping Use: Never used   Substance and Sexual Activity   • Alcohol use: No   • Drug use: No   • Sexual activity: Defer       Review of Systems:  The following systems were reviewed as they relate to the cardiovascular system: Constitutional, Eyes, ENT, Cardiovascular, Respiratory, Gastrointestinal, Integumentary, Neurological, Psychiatric, Hematologic, Endocrine, Musculoskeletal, and Genitourinary. The pertinent cardiovascular findings are reported above with all other cardiovascular points within those systems being negative.    Diagnostic Study Review:     Current Electrocardiogram:  Procedures      NOTE: The following portions of the patient's history were reviewed and updated this visit as appropriate: allergies, current medications, past family history, past medical history, past social history, past surgical history and problem list.

## 2022-10-21 ENCOUNTER — OFFICE VISIT (OUTPATIENT)
Dept: CARDIOLOGY | Facility: CLINIC | Age: 82
End: 2022-10-21

## 2022-10-21 VITALS
BODY MASS INDEX: 28.71 KG/M2 | WEIGHT: 212 LBS | DIASTOLIC BLOOD PRESSURE: 66 MMHG | HEART RATE: 66 BPM | SYSTOLIC BLOOD PRESSURE: 108 MMHG | HEIGHT: 72 IN | OXYGEN SATURATION: 94 % | RESPIRATION RATE: 18 BRPM

## 2022-10-21 DIAGNOSIS — I25.118 CORONARY ARTERY DISEASE OF NATIVE ARTERY OF NATIVE HEART WITH STABLE ANGINA PECTORIS: Primary | Chronic | ICD-10-CM

## 2022-10-21 DIAGNOSIS — I10 ESSENTIAL HYPERTENSION: Chronic | ICD-10-CM

## 2022-10-21 DIAGNOSIS — I20.9 ANGINA PECTORIS: ICD-10-CM

## 2022-10-21 DIAGNOSIS — E78.5 HYPERLIPIDEMIA LDL GOAL <70: Chronic | ICD-10-CM

## 2022-10-21 PROCEDURE — 99214 OFFICE O/P EST MOD 30 MIN: CPT | Performed by: INTERNAL MEDICINE

## 2022-10-21 PROCEDURE — 93000 ELECTROCARDIOGRAM COMPLETE: CPT | Performed by: INTERNAL MEDICINE

## 2022-10-21 RX ORDER — ASPIRIN 81 MG/1
81 TABLET ORAL DAILY
COMMUNITY
Start: 2022-10-21

## 2022-10-21 RX ORDER — METFORMIN HYDROCHLORIDE 500 MG/1
TABLET, EXTENDED RELEASE ORAL
COMMUNITY
Start: 2022-08-15

## 2022-10-21 NOTE — PROGRESS NOTES
Cardiology Office Visit      Encounter Date:  10/21/2022    Patient ID:   Darrick Andrade is a 82 y.o. male.    Reason For Followup:  Coronary artery disease  Hypertension  Hyperlipidemia      Brief Clinical History:  Dear Emily Etienne APRN    I had the pleasure of seeing Darrick Andrade today. As you are well aware, this is a 82 y.o. male with past medical history that is significant for history of  hypertension hyperlipidemia, currently on maximal medical therapy including aspirin statin beta-blocker and Imdur continued to have recurrent episodes of chest discomfort of the new suspicious for anginal equivalent.     Echocardiogram with normal LV systolic function  Patient cardiac catheterization showed severe 2 vessel coronary artery disease involving LAD and ostium of the right coronary artery/ Likely LAD is a culprit lesion for patient's symptoms of angina    Interval History:  Patient denies any further symptoms of chest discomfort  Denies any symptoms of chest pain shortness of breath dizziness or syncope  No further episodes of dizziness or syncope  Compliant with medical therapy  Assessment & Plan    Impressions:  Coronary artery disease  Stable angina  Hypertension  Hyperlipidemia  Nonobstructing distal esophageal stricture  Prior peptic ulcer disease with no recurrence of symptoms  Recent hospitalization with active COVID-19 infection and syncope with no recurrence of symptoms    Recommendations:  LAD lesion is somewhat risky in terms of intervention plan to treat him medically at this time   patient likes to try conservative therapy at this time   continue Ranexa and close monitoring  If the patient continues to be symptomatic will consider high-risk PCI to the LAD   need for close monitoring and follow-up discussed with the patient   cath films reviewed with the patient  Patient denies any further symptoms of chest discomfort with Ranexa  Continue Ranexa and maximal medical therapy for  "now  If patient fails medical therapy consider PCI and stenting of the mid LAD at the diagonal bifurcation  aspirin 81 mg p.o. once a day with food  Risk benefits and alternatives were aspirin reviewed and discussed with the patient  Stable from cardiac standpoint  Continue current medical therapy with isosorbide 60 mg p.o. once a day aspirin 81 mg p.o. once a day Ranexa 500 mg p.o. twice daily metoprolol 100 mg p.o. 2 times a day losartan 25 mg p.o. once a day  Follow-up in office in 6 months    Objective:    Vitals:  Vitals:    10/21/22 0940   BP: 108/66   BP Location: Left arm   Patient Position: Sitting   Cuff Size: Large Adult   Pulse: 66   Resp: 18   SpO2: 94%   Weight: 96.2 kg (212 lb)   Height: 182.9 cm (72\")       Physical Exam:    General: Alert, cooperative, no distress, appears stated age  Head:  Normocephalic, atraumatic, mucous membranes moist  Eyes:  Conjunctiva/corneas clear, EOM's intact     Neck:  Supple,  no adenopathy;      Lungs: Clear to auscultation bilaterally, no wheezes rhonchi rales are noted  Chest wall: No tenderness  Heart::  Regular rate and rhythm, S1 and S2 normal, no murmur, rub or gallop  Abdomen: Soft, non-tender, nondistended bowel sounds active  Extremities: No cyanosis, clubbing, or edema  Pulses: 2+ and symmetric all extremities  Skin:  No rashes or lesions  Neuro/psych: A&O x3. CN II through XII are grossly intact with appropriate affect      Allergies:  No Known Allergies    Medication Review:     Current Outpatient Medications:   •  isosorbide mononitrate (IMDUR) 60 MG 24 hr tablet, Take 30 mg by mouth Daily., Disp: , Rfl:   •  levothyroxine (SYNTHROID, LEVOTHROID) 50 MCG tablet, Take 50 mcg by mouth Daily., Disp: , Rfl:   •  losartan (COZAAR) 25 MG tablet, Take 12.5 mg by mouth Every Night., Disp: , Rfl:   •  meclizine (ANTIVERT) 25 MG tablet, Take 12.5 mg by mouth 3 (Three) Times a Day As Needed., Disp: , Rfl:   •  metFORMIN ER (GLUCOPHAGE-XR) 500 MG 24 hr tablet, , Disp: " , Rfl:   •  metoprolol tartrate (LOPRESSOR) 100 MG tablet, Take 100 mg by mouth 2 (Two) Times a Day., Disp: , Rfl:   •  pantoprazole (PROTONIX) 40 MG EC tablet, Take 1 tablet by mouth 2 (Two) Times a Day Before Meals., Disp: 60 tablet, Rfl: 2  •  ranolazine (RANEXA) 500 MG 12 hr tablet, TAKE ONE TABLET BY MOUTH TWICE A DAY, Disp: 60 tablet, Rfl: 4  •  rosuvastatin (CRESTOR) 20 MG tablet, Take 20 mg by mouth Every Night., Disp: , Rfl:     Family History:  Family History   Problem Relation Age of Onset   • Hyperlipidemia Mother    • Hyperlipidemia Father    • Brain cancer Father        Past Medical History:  Past Medical History:   Diagnosis Date   • Basal cell carcinoma of skin 9/22/2014   • Coronary artery disease of native artery of native heart with stable angina pectoris (HCC) 7/15/2019   • COVID-19 vaccination declined    • Essential hypertension 7/15/2019   • Gastrointestinal hemorrhage with melena 5/9/2020   • Glaucoma    • Hyperlipidemia LDL goal <70 7/15/2019   • Hypothyroidism        Past surgical History:  Past Surgical History:   Procedure Laterality Date   • CARDIAC CATHETERIZATION  04/26/2019    No stents placed   • ENDOSCOPY N/A 5/10/2020    Procedure: ESOPHAGOGASTRODUODENOSCOPY  WITH BIOPSY X 1 AREA;  Surgeon: Chava Wood MD;  Location: [facility] ENDOSCOPY;  Service: Gastroenterology;  Laterality: N/A;  POST OP: DUODENAL ULCER, GASTRIC ULCER, HITAL HERNIA, ESOPHAGEAL STRICTURE   • HERNIA REPAIR     • OTHER SURGICAL HISTORY      lump on back of head removed   • TOE SURGERY         Social History:  Social History     Socioeconomic History   • Marital status:    Tobacco Use   • Smoking status: Former   • Smokeless tobacco: Never   Vaping Use   • Vaping Use: Never used   Substance and Sexual Activity   • Alcohol use: No   • Drug use: No   • Sexual activity: Defer       Review of Systems:  The following systems were reviewed as they relate to the cardiovascular system: Constitutional,  Eyes, ENT, Cardiovascular, Respiratory, Gastrointestinal, Integumentary, Neurological, Psychiatric, Hematologic, Endocrine, Musculoskeletal, and Genitourinary. The pertinent cardiovascular findings are reported above with all other cardiovascular points within those systems being negative.    Diagnostic Study Review:     Current Electrocardiogram:    ECG 12 Lead    Date/Time: 10/21/2022 10:22 AM  Performed by: Hien Parisi MD  Authorized by: Hien Parisi MD   Comparison: compared with previous ECG   Similar to previous ECG  Rhythm: sinus rhythm  Rate: normal  BPM: 66  Conduction: conduction normal  QRS axis: normal  Other findings: non-specific ST-T wave changes    Clinical impression: abnormal EKG              NOTE: The following portions of the patient's history were reviewed and updated this visit as appropriate: allergies, current medications, past family history, past medical history, past social history, past surgical history and problem list.

## 2022-12-01 ENCOUNTER — APPOINTMENT (OUTPATIENT)
Dept: CT IMAGING | Facility: HOSPITAL | Age: 82
End: 2022-12-01

## 2022-12-01 ENCOUNTER — APPOINTMENT (OUTPATIENT)
Dept: GENERAL RADIOLOGY | Facility: HOSPITAL | Age: 82
End: 2022-12-01

## 2022-12-01 ENCOUNTER — HOSPITAL ENCOUNTER (EMERGENCY)
Facility: HOSPITAL | Age: 82
Discharge: HOME | End: 2022-12-01
Attending: EMERGENCY MEDICINE | Admitting: EMERGENCY MEDICINE

## 2022-12-01 VITALS
OXYGEN SATURATION: 96 % | HEIGHT: 71 IN | DIASTOLIC BLOOD PRESSURE: 85 MMHG | RESPIRATION RATE: 16 BRPM | SYSTOLIC BLOOD PRESSURE: 127 MMHG | WEIGHT: 213 LBS | BODY MASS INDEX: 29.82 KG/M2 | HEART RATE: 75 BPM | TEMPERATURE: 97.3 F

## 2022-12-01 DIAGNOSIS — R42 DIZZINESS: Primary | ICD-10-CM

## 2022-12-01 LAB
ALBUMIN SERPL-MCNC: 4 G/DL (ref 3.5–5.2)
ALBUMIN/GLOB SERPL: 1.4 G/DL
ALP SERPL-CCNC: 49 U/L (ref 39–117)
ALT SERPL W P-5'-P-CCNC: 12 U/L (ref 1–41)
ANION GAP SERPL CALCULATED.3IONS-SCNC: 13 MMOL/L (ref 5–15)
AST SERPL-CCNC: 21 U/L (ref 1–40)
BACTERIA UR QL AUTO: ABNORMAL /HPF
BASOPHILS # BLD AUTO: 0 10*3/MM3 (ref 0–0.2)
BASOPHILS NFR BLD AUTO: 0.5 % (ref 0–1.5)
BILIRUB SERPL-MCNC: 0.6 MG/DL (ref 0–1.2)
BILIRUB UR QL STRIP: NEGATIVE
BUN SERPL-MCNC: 17 MG/DL (ref 8–23)
BUN/CREAT SERPL: 12.5 (ref 7–25)
CALCIUM SPEC-SCNC: 9.2 MG/DL (ref 8.6–10.5)
CHLORIDE SERPL-SCNC: 102 MMOL/L (ref 98–107)
CLARITY UR: CLEAR
CO2 SERPL-SCNC: 25 MMOL/L (ref 22–29)
COLOR UR: ABNORMAL
CREAT SERPL-MCNC: 1.36 MG/DL (ref 0.76–1.27)
DEPRECATED RDW RBC AUTO: 44.6 FL (ref 37–54)
EGFRCR SERPLBLD CKD-EPI 2021: 52.3 ML/MIN/1.73
EOSINOPHIL # BLD AUTO: 0.1 10*3/MM3 (ref 0–0.4)
EOSINOPHIL NFR BLD AUTO: 1.9 % (ref 0.3–6.2)
ERYTHROCYTE [DISTWIDTH] IN BLOOD BY AUTOMATED COUNT: 12.9 % (ref 12.3–15.4)
GLOBULIN UR ELPH-MCNC: 2.9 GM/DL
GLUCOSE SERPL-MCNC: 101 MG/DL (ref 65–99)
GLUCOSE UR STRIP-MCNC: NEGATIVE MG/DL
HCT VFR BLD AUTO: 44.1 % (ref 37.5–51)
HGB BLD-MCNC: 14.9 G/DL (ref 13–17.7)
HGB UR QL STRIP.AUTO: NEGATIVE
HYALINE CASTS UR QL AUTO: ABNORMAL /LPF
KETONES UR QL STRIP: ABNORMAL
LEUKOCYTE ESTERASE UR QL STRIP.AUTO: ABNORMAL
LYMPHOCYTES # BLD AUTO: 1.8 10*3/MM3 (ref 0.7–3.1)
LYMPHOCYTES NFR BLD AUTO: 23.9 % (ref 19.6–45.3)
MAGNESIUM SERPL-MCNC: 1.6 MG/DL (ref 1.6–2.4)
MCH RBC QN AUTO: 31.8 PG (ref 26.6–33)
MCHC RBC AUTO-ENTMCNC: 33.9 G/DL (ref 31.5–35.7)
MCV RBC AUTO: 93.9 FL (ref 79–97)
MONOCYTES # BLD AUTO: 0.6 10*3/MM3 (ref 0.1–0.9)
MONOCYTES NFR BLD AUTO: 8.3 % (ref 5–12)
NEUTROPHILS NFR BLD AUTO: 5 10*3/MM3 (ref 1.7–7)
NEUTROPHILS NFR BLD AUTO: 65.4 % (ref 42.7–76)
NITRITE UR QL STRIP: NEGATIVE
NRBC BLD AUTO-RTO: 0.1 /100 WBC (ref 0–0.2)
PH UR STRIP.AUTO: 7 [PH] (ref 5–8)
PLATELET # BLD AUTO: 227 10*3/MM3 (ref 140–450)
PMV BLD AUTO: 8.1 FL (ref 6–12)
POTASSIUM SERPL-SCNC: 4.4 MMOL/L (ref 3.5–5.2)
PROT SERPL-MCNC: 6.9 G/DL (ref 6–8.5)
PROT UR QL STRIP: NEGATIVE
RBC # BLD AUTO: 4.69 10*6/MM3 (ref 4.14–5.8)
RBC # UR STRIP: ABNORMAL /HPF
REF LAB TEST METHOD: ABNORMAL
SODIUM SERPL-SCNC: 140 MMOL/L (ref 136–145)
SP GR UR STRIP: 1.02 (ref 1–1.03)
SQUAMOUS #/AREA URNS HPF: ABNORMAL /HPF
TROPONIN T SERPL-MCNC: <0.01 NG/ML (ref 0–0.03)
TSH SERPL DL<=0.05 MIU/L-ACNC: 2.49 UIU/ML (ref 0.27–4.2)
UROBILINOGEN UR QL STRIP: ABNORMAL
WBC # UR STRIP: ABNORMAL /HPF
WBC NRBC COR # BLD: 7.6 10*3/MM3 (ref 3.4–10.8)

## 2022-12-01 PROCEDURE — 99284 EMERGENCY DEPT VISIT MOD MDM: CPT

## 2022-12-01 PROCEDURE — 81001 URINALYSIS AUTO W/SCOPE: CPT | Performed by: PHYSICIAN ASSISTANT

## 2022-12-01 PROCEDURE — 80053 COMPREHEN METABOLIC PANEL: CPT | Performed by: PHYSICIAN ASSISTANT

## 2022-12-01 PROCEDURE — 84484 ASSAY OF TROPONIN QUANT: CPT | Performed by: PHYSICIAN ASSISTANT

## 2022-12-01 PROCEDURE — 83735 ASSAY OF MAGNESIUM: CPT | Performed by: PHYSICIAN ASSISTANT

## 2022-12-01 PROCEDURE — 70450 CT HEAD/BRAIN W/O DYE: CPT

## 2022-12-01 PROCEDURE — 93005 ELECTROCARDIOGRAM TRACING: CPT

## 2022-12-01 PROCEDURE — 93005 ELECTROCARDIOGRAM TRACING: CPT | Performed by: EMERGENCY MEDICINE

## 2022-12-01 PROCEDURE — 85025 COMPLETE CBC W/AUTO DIFF WBC: CPT | Performed by: PHYSICIAN ASSISTANT

## 2022-12-01 PROCEDURE — 84443 ASSAY THYROID STIM HORMONE: CPT | Performed by: PHYSICIAN ASSISTANT

## 2022-12-01 PROCEDURE — 71045 X-RAY EXAM CHEST 1 VIEW: CPT

## 2022-12-01 RX ORDER — SODIUM CHLORIDE 0.9 % (FLUSH) 0.9 %
10 SYRINGE (ML) INJECTION AS NEEDED
Status: DISCONTINUED | OUTPATIENT
Start: 2022-12-01 | End: 2022-12-01 | Stop reason: HOSPADM

## 2022-12-01 NOTE — ED PROVIDER NOTES
Subjective    Provider in Triage Note  Patient is an 82-year-old male presented to the ED with complaints of lightheadedness and dizziness that started yesterday.  Patient states he was driving when it started he felt like his vision was narrowing.  Patient states has had similar symptoms in the past that usually resolve on their own.  Patient states this episode lasted for about an hour to an hour and a half he tried drinking water and Gatorade as sometimes it is caused by dehydration but it was not helping.  He denies any significant chest pain or shortness of breath during this.  Patient states since then he has felt lightheaded and off-balance.  Patient states he called his PCP who advised him come to the ED for further management.      History of Present Illness  I interviewed the patient for HPI and agree with nurse practitioner providing triage note as noted above  Review of Systems   Constitutional: Negative for chills and fever.   HENT: Negative for congestion and sore throat.    Eyes: Negative for visual disturbance.   Respiratory: Negative for cough and shortness of breath.    Cardiovascular: Negative for chest pain.   Gastrointestinal: Negative for abdominal pain, diarrhea and vomiting.   Endocrine: Negative for polyuria.   Genitourinary: Negative for dysuria and flank pain.   Musculoskeletal: Negative for back pain.   Neurological: Positive for dizziness. Negative for weakness and headaches.       Past Medical History:   Diagnosis Date   • Basal cell carcinoma of skin 9/22/2014   • Coronary artery disease of native artery of native heart with stable angina pectoris (HCC) 7/15/2019   • COVID-19 vaccination declined    • Essential hypertension 7/15/2019   • Gastrointestinal hemorrhage with melena 5/9/2020   • Glaucoma    • Hyperlipidemia LDL goal <70 7/15/2019   • Hypothyroidism        No Known Allergies    Past Surgical History:   Procedure Laterality Date   • CARDIAC CATHETERIZATION  04/26/2019    No  stents placed   • ENDOSCOPY N/A 5/10/2020    Procedure: ESOPHAGOGASTRODUODENOSCOPY  WITH BIOPSY X 1 AREA;  Surgeon: Chava Wood MD;  Location: [facility] ENDOSCOPY;  Service: Gastroenterology;  Laterality: N/A;  POST OP: DUODENAL ULCER, GASTRIC ULCER, HITAL HERNIA, ESOPHAGEAL STRICTURE   • HERNIA REPAIR     • OTHER SURGICAL HISTORY      lump on back of head removed   • TOE SURGERY         Family History   Problem Relation Age of Onset   • Hyperlipidemia Mother    • Hyperlipidemia Father    • Brain cancer Father        Social History     Socioeconomic History   • Marital status:    Tobacco Use   • Smoking status: Former   • Smokeless tobacco: Never   Vaping Use   • Vaping Use: Never used   Substance and Sexual Activity   • Alcohol use: No   • Drug use: No   • Sexual activity: Defer           Objective   Physical Exam  Neurologic exam is nonfocal.  HEENT exam shows TMs to be clear.  Oropharynx comers but sclera nonicteric.  Neck has no adenopathy JVD or bruits.  Lungs are clear.  Heart has regular rate rhythm without murmur or gallop.  Chest is nontender.  Abdomen is soft nontender extremities and is no cyanosis or edema  Procedures       Like interpretation was normal sinus rhythm at a rate of 72 with no acute ST change    ED Course      Results for orders placed or performed during the hospital encounter of 12/01/22   Comprehensive Metabolic Panel    Specimen: Arm, Right; Blood   Result Value Ref Range    Glucose 101 (H) 65 - 99 mg/dL    BUN 17 8 - 23 mg/dL    Creatinine 1.36 (H) 0.76 - 1.27 mg/dL    Sodium 140 136 - 145 mmol/L    Potassium 4.4 3.5 - 5.2 mmol/L    Chloride 102 98 - 107 mmol/L    CO2 25.0 22.0 - 29.0 mmol/L    Calcium 9.2 8.6 - 10.5 mg/dL    Total Protein 6.9 6.0 - 8.5 g/dL    Albumin 4.00 3.50 - 5.20 g/dL    ALT (SGPT) 12 1 - 41 U/L    AST (SGOT) 21 1 - 40 U/L    Alkaline Phosphatase 49 39 - 117 U/L    Total Bilirubin 0.6 0.0 - 1.2 mg/dL    Globulin 2.9 gm/dL    A/G Ratio 1.4 g/dL     BUN/Creatinine Ratio 12.5 7.0 - 25.0    Anion Gap 13.0 5.0 - 15.0 mmol/L    eGFR 52.3 (L) >60.0 mL/min/1.73   Troponin    Specimen: Arm, Right; Blood   Result Value Ref Range    Troponin T <0.010 0.000 - 0.030 ng/mL   TSH    Specimen: Arm, Right; Blood   Result Value Ref Range    TSH 2.490 0.270 - 4.200 uIU/mL   Magnesium    Specimen: Arm, Right; Blood   Result Value Ref Range    Magnesium 1.6 1.6 - 2.4 mg/dL   Urinalysis With Microscopic If Indicated (No Culture) - Urine, Clean Catch    Specimen: Urine, Clean Catch   Result Value Ref Range    Color, UA Dark Yellow (A) Yellow, Straw    Appearance, UA Clear Clear    pH, UA 7.0 5.0 - 8.0    Specific Gravity, UA 1.017 1.005 - 1.030    Glucose, UA Negative Negative    Ketones, UA Trace (A) Negative    Bilirubin, UA Negative Negative    Blood, UA Negative Negative    Protein, UA Negative Negative    Leuk Esterase, UA Trace (A) Negative    Nitrite, UA Negative Negative    Urobilinogen, UA 1.0 E.U./dL 0.2 - 1.0 E.U./dL   CBC Auto Differential    Specimen: Arm, Right; Blood   Result Value Ref Range    WBC 7.60 3.40 - 10.80 10*3/mm3    RBC 4.69 4.14 - 5.80 10*6/mm3    Hemoglobin 14.9 13.0 - 17.7 g/dL    Hematocrit 44.1 37.5 - 51.0 %    MCV 93.9 79.0 - 97.0 fL    MCH 31.8 26.6 - 33.0 pg    MCHC 33.9 31.5 - 35.7 g/dL    RDW 12.9 12.3 - 15.4 %    RDW-SD 44.6 37.0 - 54.0 fl    MPV 8.1 6.0 - 12.0 fL    Platelets 227 140 - 450 10*3/mm3    Neutrophil % 65.4 42.7 - 76.0 %    Lymphocyte % 23.9 19.6 - 45.3 %    Monocyte % 8.3 5.0 - 12.0 %    Eosinophil % 1.9 0.3 - 6.2 %    Basophil % 0.5 0.0 - 1.5 %    Neutrophils, Absolute 5.00 1.70 - 7.00 10*3/mm3    Lymphocytes, Absolute 1.80 0.70 - 3.10 10*3/mm3    Monocytes, Absolute 0.60 0.10 - 0.90 10*3/mm3    Eosinophils, Absolute 0.10 0.00 - 0.40 10*3/mm3    Basophils, Absolute 0.00 0.00 - 0.20 10*3/mm3    nRBC 0.1 0.0 - 0.2 /100 WBC   Urinalysis, Microscopic Only - Urine, Clean Catch    Specimen: Urine, Clean Catch   Result Value Ref  Range    RBC, UA 0-2 (A) None Seen /HPF    WBC, UA 0-2 (A) None Seen /HPF    Bacteria, UA None Seen None Seen /HPF    Squamous Epithelial Cells, UA 0-2 None Seen, 0-2 /HPF    Hyaline Casts, UA None Seen None Seen /LPF    Methodology Automated Microscopy    ECG 12 Lead Other; triage   Result Value Ref Range    QT Interval 402 ms     CT Head Without Contrast    Result Date: 12/1/2022  No acute intracranial process demonstrated  Electronically Signed By-Roger Del Toro On:12/1/2022 7:27 PM This report was finalized on [id number] by  Roger Del Toro, .    XR Chest 1 View    Result Date: 12/1/2022  No acute process demonstrated  Electronically Signed By-Roger Del Toro On:12/1/2022 6:10 PM This report was finalized on [id number] by  Roger Del Toro, .                                         MDM  Number of Diagnoses or Management Options  Diagnosis management comments: Patient has nonfocal neurologic exam.  CT scan of several contrast shows no acute process or metabolic panel is at baseline without renal insufficiency or electrolyte abnormality.  There is no evidence acute infectious process.  Patient feels at baseline reexam.  Will be discharged.  Will follow the need further outpatient evaluation as needed.       Amount and/or Complexity of Data Reviewed  Clinical lab tests: reviewed  Tests in the radiology section of CPT®: reviewed  Tests in the medicine section of CPT®: reviewed    Risk of Complications, Morbidity, and/or Mortality  Presenting problems: high  Diagnostic procedures: high  Management options: high    Patient Progress  Patient progress: stable      Final diagnoses:   Dizziness       ED Disposition  ED Disposition     ED Disposition   Discharge    Condition   Stable    Comment   --             No follow-up provider specified.       Medication List      No changes were made to your prescriptions during this visit.          Chris Lin MD  12/01/22 2006

## 2022-12-01 NOTE — ED NOTES
Pt c/o not being able to keep his balance since yesterday, distorted vision. Pt called PCP and he was told to come to ED. Pt became lightheaded and diaphoretic. Pt reports hx of these episodes but this one was worse. Pt lost control over using his arms for a short period

## 2022-12-03 LAB — QT INTERVAL: 402 MS

## 2023-03-07 ENCOUNTER — HOSPITAL ENCOUNTER (INPATIENT)
Facility: HOSPITAL | Age: 83
LOS: 2 days | Discharge: HOME | DRG: 247 | End: 2023-03-10
Attending: EMERGENCY MEDICINE | Admitting: INTERNAL MEDICINE
Payer: MEDICARE

## 2023-03-07 DIAGNOSIS — I21.3 ST ELEVATION MYOCARDIAL INFARCTION (STEMI), UNSPECIFIED ARTERY: Primary | ICD-10-CM

## 2023-03-07 PROCEDURE — 93005 ELECTROCARDIOGRAM TRACING: CPT | Performed by: EMERGENCY MEDICINE

## 2023-03-07 PROCEDURE — 99285 EMERGENCY DEPT VISIT HI MDM: CPT

## 2023-03-07 PROCEDURE — 93005 ELECTROCARDIOGRAM TRACING: CPT

## 2023-03-08 ENCOUNTER — APPOINTMENT (OUTPATIENT)
Dept: GENERAL RADIOLOGY | Facility: HOSPITAL | Age: 83
DRG: 247 | End: 2023-03-08
Payer: MEDICARE

## 2023-03-08 ENCOUNTER — APPOINTMENT (OUTPATIENT)
Dept: CARDIOLOGY | Facility: HOSPITAL | Age: 83
DRG: 247 | End: 2023-03-08
Payer: MEDICARE

## 2023-03-08 PROBLEM — I20.9 ANGINA PECTORIS: Status: RESOLVED | Noted: 2019-04-15 | Resolved: 2023-03-08

## 2023-03-08 PROBLEM — I21.3 STEMI (ST ELEVATION MYOCARDIAL INFARCTION) (HCC): Status: ACTIVE | Noted: 2023-03-08

## 2023-03-08 PROBLEM — D89.831 CYTOKINE RELEASE SYNDROME, GRADE 1: Status: RESOLVED | Noted: 2022-01-28 | Resolved: 2023-03-08

## 2023-03-08 PROBLEM — I21.3 ST ELEVATION MYOCARDIAL INFARCTION (STEMI), UNSPECIFIED ARTERY (HCC): Status: ACTIVE | Noted: 2023-03-08

## 2023-03-08 PROBLEM — U07.1 COVID-19 VIRUS DETECTED: Status: RESOLVED | Noted: 2022-01-27 | Resolved: 2023-03-08

## 2023-03-08 PROBLEM — U07.1 COVID: Status: RESOLVED | Noted: 2022-01-28 | Resolved: 2023-03-08

## 2023-03-08 PROBLEM — I25.10 CORONARY ARTERY DISEASE INVOLVING NATIVE CORONARY ARTERY OF NATIVE HEART: Status: ACTIVE | Noted: 2019-07-15

## 2023-03-08 PROBLEM — I25.10 CORONARY ARTERY DISEASE INVOLVING NATIVE CORONARY ARTERY OF NATIVE HEART: Chronic | Status: ACTIVE | Noted: 2019-07-15

## 2023-03-08 LAB
ACT BLD: 305 SECONDS (ref 89–137)
ACT BLD: 311 SECONDS (ref 89–137)
ALBUMIN SERPL-MCNC: 3.7 G/DL (ref 3.5–5.2)
ALBUMIN SERPL-MCNC: 4 G/DL (ref 3.5–5.2)
ALBUMIN/GLOB SERPL: 1.1 G/DL
ALBUMIN/GLOB SERPL: 1.2 G/DL
ALP SERPL-CCNC: 47 U/L (ref 39–117)
ALP SERPL-CCNC: 55 U/L (ref 39–117)
ALT SERPL W P-5'-P-CCNC: 14 U/L (ref 1–41)
ALT SERPL W P-5'-P-CCNC: 20 U/L (ref 1–41)
ANION GAP SERPL CALCULATED.3IONS-SCNC: 12 MMOL/L (ref 5–15)
ANION GAP SERPL CALCULATED.3IONS-SCNC: 13 MMOL/L (ref 5–15)
APTT PPP: 27.1 SECONDS (ref 61–76.5)
AST SERPL-CCNC: 20 U/L (ref 1–40)
AST SERPL-CCNC: 72 U/L (ref 1–40)
BASOPHILS # BLD AUTO: 0 10*3/MM3 (ref 0–0.2)
BASOPHILS # BLD AUTO: 0 10*3/MM3 (ref 0–0.2)
BASOPHILS NFR BLD AUTO: 0.1 % (ref 0–1.5)
BASOPHILS NFR BLD AUTO: 0.4 % (ref 0–1.5)
BILIRUB SERPL-MCNC: 0.5 MG/DL (ref 0–1.2)
BILIRUB SERPL-MCNC: 0.9 MG/DL (ref 0–1.2)
BUN SERPL-MCNC: 15 MG/DL (ref 8–23)
BUN SERPL-MCNC: 20 MG/DL (ref 8–23)
BUN/CREAT SERPL: 16.1 (ref 7–25)
BUN/CREAT SERPL: 18.2 (ref 7–25)
CALCIUM SPEC-SCNC: 8.9 MG/DL (ref 8.6–10.5)
CALCIUM SPEC-SCNC: 9.3 MG/DL (ref 8.6–10.5)
CHLORIDE SERPL-SCNC: 101 MMOL/L (ref 98–107)
CHLORIDE SERPL-SCNC: 105 MMOL/L (ref 98–107)
CHOLEST SERPL-MCNC: 160 MG/DL (ref 0–200)
CO2 SERPL-SCNC: 23 MMOL/L (ref 22–29)
CO2 SERPL-SCNC: 24 MMOL/L (ref 22–29)
CREAT SERPL-MCNC: 0.93 MG/DL (ref 0.76–1.27)
CREAT SERPL-MCNC: 1.1 MG/DL (ref 0.76–1.27)
DEPRECATED RDW RBC AUTO: 45.1 FL (ref 37–54)
DEPRECATED RDW RBC AUTO: 45.9 FL (ref 37–54)
EGFRCR SERPLBLD CKD-EPI 2021: 67 ML/MIN/1.73
EGFRCR SERPLBLD CKD-EPI 2021: 82 ML/MIN/1.73
EOSINOPHIL # BLD AUTO: 0 10*3/MM3 (ref 0–0.4)
EOSINOPHIL # BLD AUTO: 0.1 10*3/MM3 (ref 0–0.4)
EOSINOPHIL NFR BLD AUTO: 0.4 % (ref 0.3–6.2)
EOSINOPHIL NFR BLD AUTO: 0.8 % (ref 0.3–6.2)
ERYTHROCYTE [DISTWIDTH] IN BLOOD BY AUTOMATED COUNT: 13.6 % (ref 12.3–15.4)
ERYTHROCYTE [DISTWIDTH] IN BLOOD BY AUTOMATED COUNT: 13.9 % (ref 12.3–15.4)
GLOBULIN UR ELPH-MCNC: 3.2 GM/DL
GLOBULIN UR ELPH-MCNC: 3.7 GM/DL
GLUCOSE SERPL-MCNC: 119 MG/DL (ref 65–99)
GLUCOSE SERPL-MCNC: 138 MG/DL (ref 65–99)
HBA1C MFR BLD: 5.7 % (ref 4.8–5.6)
HCT VFR BLD AUTO: 42.3 % (ref 37.5–51)
HCT VFR BLD AUTO: 47.5 % (ref 37.5–51)
HDLC SERPL-MCNC: 38 MG/DL (ref 40–60)
HGB BLD-MCNC: 14.3 G/DL (ref 13–17.7)
HGB BLD-MCNC: 15.4 G/DL (ref 13–17.7)
INR PPP: 1 (ref 0.93–1.1)
LDLC SERPL CALC-MCNC: 93 MG/DL (ref 0–100)
LDLC/HDLC SERPL: 2.33 {RATIO}
LYMPHOCYTES # BLD AUTO: 1.2 10*3/MM3 (ref 0.7–3.1)
LYMPHOCYTES # BLD AUTO: 1.8 10*3/MM3 (ref 0.7–3.1)
LYMPHOCYTES NFR BLD AUTO: 10.1 % (ref 19.6–45.3)
LYMPHOCYTES NFR BLD AUTO: 16.6 % (ref 19.6–45.3)
MAGNESIUM SERPL-MCNC: 1.7 MG/DL (ref 1.6–2.4)
MCH RBC QN AUTO: 30.4 PG (ref 26.6–33)
MCH RBC QN AUTO: 31 PG (ref 26.6–33)
MCHC RBC AUTO-ENTMCNC: 32.4 G/DL (ref 31.5–35.7)
MCHC RBC AUTO-ENTMCNC: 33.9 G/DL (ref 31.5–35.7)
MCV RBC AUTO: 91.6 FL (ref 79–97)
MCV RBC AUTO: 93.8 FL (ref 79–97)
MONOCYTES # BLD AUTO: 0.9 10*3/MM3 (ref 0.1–0.9)
MONOCYTES # BLD AUTO: 1 10*3/MM3 (ref 0.1–0.9)
MONOCYTES NFR BLD AUTO: 8 % (ref 5–12)
MONOCYTES NFR BLD AUTO: 8.7 % (ref 5–12)
NEUTROPHILS NFR BLD AUTO: 73.5 % (ref 42.7–76)
NEUTROPHILS NFR BLD AUTO: 8.2 10*3/MM3 (ref 1.7–7)
NEUTROPHILS NFR BLD AUTO: 81.4 % (ref 42.7–76)
NEUTROPHILS NFR BLD AUTO: 9.6 10*3/MM3 (ref 1.7–7)
NRBC BLD AUTO-RTO: 0 /100 WBC (ref 0–0.2)
NRBC BLD AUTO-RTO: 0.1 /100 WBC (ref 0–0.2)
PHOSPHATE SERPL-MCNC: 2.7 MG/DL (ref 2.5–4.5)
PLATELET # BLD AUTO: 201 10*3/MM3 (ref 140–450)
PLATELET # BLD AUTO: 261 10*3/MM3 (ref 140–450)
PMV BLD AUTO: 7.8 FL (ref 6–12)
PMV BLD AUTO: 8 FL (ref 6–12)
POTASSIUM SERPL-SCNC: 3.8 MMOL/L (ref 3.5–5.2)
POTASSIUM SERPL-SCNC: 4 MMOL/L (ref 3.5–5.2)
PROT SERPL-MCNC: 6.9 G/DL (ref 6–8.5)
PROT SERPL-MCNC: 7.7 G/DL (ref 6–8.5)
PROTHROMBIN TIME: 10.3 SECONDS (ref 9.6–11.7)
RBC # BLD AUTO: 4.62 10*6/MM3 (ref 4.14–5.8)
RBC # BLD AUTO: 5.07 10*6/MM3 (ref 4.14–5.8)
SODIUM SERPL-SCNC: 138 MMOL/L (ref 136–145)
SODIUM SERPL-SCNC: 140 MMOL/L (ref 136–145)
T4 FREE SERPL-MCNC: 1.29 NG/DL (ref 0.93–1.7)
TRIGL SERPL-MCNC: 168 MG/DL (ref 0–150)
TROPONIN T SERPL HS-MCNC: 226 NG/L
TSH SERPL DL<=0.05 MIU/L-ACNC: 2.94 UIU/ML (ref 0.27–4.2)
VLDLC SERPL-MCNC: 29 MG/DL (ref 5–40)
WBC NRBC COR # BLD: 11.1 10*3/MM3 (ref 3.4–10.8)
WBC NRBC COR # BLD: 11.8 10*3/MM3 (ref 3.4–10.8)

## 2023-03-08 PROCEDURE — 71045 X-RAY EXAM CHEST 1 VIEW: CPT

## 2023-03-08 PROCEDURE — C1894 INTRO/SHEATH, NON-LASER: HCPCS | Performed by: INTERNAL MEDICINE

## 2023-03-08 PROCEDURE — C9606 PERC D-E COR REVASC W AMI S: HCPCS | Performed by: INTERNAL MEDICINE

## 2023-03-08 PROCEDURE — 84439 ASSAY OF FREE THYROXINE: CPT | Performed by: STUDENT IN AN ORGANIZED HEALTH CARE EDUCATION/TRAINING PROGRAM

## 2023-03-08 PROCEDURE — 25510000001 IOPAMIDOL PER 1 ML: Performed by: INTERNAL MEDICINE

## 2023-03-08 PROCEDURE — 85347 COAGULATION TIME ACTIVATED: CPT

## 2023-03-08 PROCEDURE — 84443 ASSAY THYROID STIM HORMONE: CPT | Performed by: STUDENT IN AN ORGANIZED HEALTH CARE EDUCATION/TRAINING PROGRAM

## 2023-03-08 PROCEDURE — C1725 CATH, TRANSLUMIN NON-LASER: HCPCS | Performed by: INTERNAL MEDICINE

## 2023-03-08 PROCEDURE — 25010000002 EPTIFIBATIDE PER 5 MG: Performed by: INTERNAL MEDICINE

## 2023-03-08 PROCEDURE — 92941 PRQ TRLML REVSC TOT OCCL AMI: CPT | Performed by: INTERNAL MEDICINE

## 2023-03-08 PROCEDURE — 85025 COMPLETE CBC W/AUTO DIFF WBC: CPT | Performed by: STUDENT IN AN ORGANIZED HEALTH CARE EDUCATION/TRAINING PROGRAM

## 2023-03-08 PROCEDURE — C9601 PERC DRUG-EL COR STENT BRAN: HCPCS | Performed by: INTERNAL MEDICINE

## 2023-03-08 PROCEDURE — 25010000002 FENTANYL CITRATE (PF) 100 MCG/2ML SOLUTION: Performed by: INTERNAL MEDICINE

## 2023-03-08 PROCEDURE — 99152 MOD SED SAME PHYS/QHP 5/>YRS: CPT | Performed by: INTERNAL MEDICINE

## 2023-03-08 PROCEDURE — 25010000002 MAGNESIUM SULFATE 2 GM/50ML SOLUTION: Performed by: NURSE PRACTITIONER

## 2023-03-08 PROCEDURE — 85730 THROMBOPLASTIN TIME PARTIAL: CPT | Performed by: EMERGENCY MEDICINE

## 2023-03-08 PROCEDURE — C1887 CATHETER, GUIDING: HCPCS | Performed by: INTERNAL MEDICINE

## 2023-03-08 PROCEDURE — 92929 PR PRQ TRLUML CORONARY STENT W/ANGIO ADDL ART/BRNCH: CPT | Performed by: INTERNAL MEDICINE

## 2023-03-08 PROCEDURE — C1874 STENT, COATED/COV W/DEL SYS: HCPCS | Performed by: INTERNAL MEDICINE

## 2023-03-08 PROCEDURE — 25010000002 EPTIFIBATIDE PER 5 MG: Performed by: EMERGENCY MEDICINE

## 2023-03-08 PROCEDURE — 92978 ENDOLUMINL IVUS OCT C 1ST: CPT | Performed by: INTERNAL MEDICINE

## 2023-03-08 PROCEDURE — 25010000002 EPTIFIBATIDE 20 MG/10ML SOLUTION: Performed by: EMERGENCY MEDICINE

## 2023-03-08 PROCEDURE — 25010000002 HEPARIN (PORCINE) PER 1000 UNITS: Performed by: EMERGENCY MEDICINE

## 2023-03-08 PROCEDURE — C1760 CLOSURE DEV, VASC: HCPCS | Performed by: INTERNAL MEDICINE

## 2023-03-08 PROCEDURE — 93458 L HRT ARTERY/VENTRICLE ANGIO: CPT | Performed by: INTERNAL MEDICINE

## 2023-03-08 PROCEDURE — C1769 GUIDE WIRE: HCPCS | Performed by: INTERNAL MEDICINE

## 2023-03-08 PROCEDURE — 80053 COMPREHEN METABOLIC PANEL: CPT | Performed by: STUDENT IN AN ORGANIZED HEALTH CARE EDUCATION/TRAINING PROGRAM

## 2023-03-08 PROCEDURE — 80061 LIPID PANEL: CPT | Performed by: INTERNAL MEDICINE

## 2023-03-08 PROCEDURE — 84100 ASSAY OF PHOSPHORUS: CPT | Performed by: STUDENT IN AN ORGANIZED HEALTH CARE EDUCATION/TRAINING PROGRAM

## 2023-03-08 PROCEDURE — C1753 CATH, INTRAVAS ULTRASOUND: HCPCS | Performed by: INTERNAL MEDICINE

## 2023-03-08 PROCEDURE — 027035Z DILATION OF CORONARY ARTERY, ONE ARTERY WITH TWO DRUG-ELUTING INTRALUMINAL DEVICES, PERCUTANEOUS APPROACH: ICD-10-PCS | Performed by: INTERNAL MEDICINE

## 2023-03-08 PROCEDURE — 25010000002 HEPARIN (PORCINE) PER 1000 UNITS: Performed by: INTERNAL MEDICINE

## 2023-03-08 PROCEDURE — 85610 PROTHROMBIN TIME: CPT | Performed by: EMERGENCY MEDICINE

## 2023-03-08 PROCEDURE — 80053 COMPREHEN METABOLIC PANEL: CPT | Performed by: EMERGENCY MEDICINE

## 2023-03-08 PROCEDURE — 84484 ASSAY OF TROPONIN QUANT: CPT | Performed by: EMERGENCY MEDICINE

## 2023-03-08 PROCEDURE — 99153 MOD SED SAME PHYS/QHP EA: CPT | Performed by: INTERNAL MEDICINE

## 2023-03-08 PROCEDURE — 85025 COMPLETE CBC W/AUTO DIFF WBC: CPT | Performed by: EMERGENCY MEDICINE

## 2023-03-08 PROCEDURE — 99232 SBSQ HOSP IP/OBS MODERATE 35: CPT | Performed by: INTERNAL MEDICINE

## 2023-03-08 PROCEDURE — 4A023N7 MEASUREMENT OF CARDIAC SAMPLING AND PRESSURE, LEFT HEART, PERCUTANEOUS APPROACH: ICD-10-PCS | Performed by: INTERNAL MEDICINE

## 2023-03-08 PROCEDURE — 99291 CRITICAL CARE FIRST HOUR: CPT | Performed by: INTERNAL MEDICINE

## 2023-03-08 PROCEDURE — 83735 ASSAY OF MAGNESIUM: CPT | Performed by: STUDENT IN AN ORGANIZED HEALTH CARE EDUCATION/TRAINING PROGRAM

## 2023-03-08 PROCEDURE — 25010000002 HYDROMORPHONE 1 MG/ML SOLUTION: Performed by: INTERNAL MEDICINE

## 2023-03-08 PROCEDURE — B2111ZZ FLUOROSCOPY OF MULTIPLE CORONARY ARTERIES USING LOW OSMOLAR CONTRAST: ICD-10-PCS | Performed by: INTERNAL MEDICINE

## 2023-03-08 PROCEDURE — 83036 HEMOGLOBIN GLYCOSYLATED A1C: CPT | Performed by: STUDENT IN AN ORGANIZED HEALTH CARE EDUCATION/TRAINING PROGRAM

## 2023-03-08 DEVICE — XIENCE SKYPOINT™ EVEROLIMUS ELUTING CORONARY STENT SYSTEM 2.50 MM X 23 MM / RAPID-EXCHANGE
Type: IMPLANTABLE DEVICE | Site: CORONARY | Status: FUNCTIONAL
Brand: XIENCE SKYPOINT™

## 2023-03-08 DEVICE — XIENCE SKYPOINT™ EVEROLIMUS ELUTING CORONARY STENT SYSTEM 3.00 MM X 12 MM / RAPID-EXCHANGE
Type: IMPLANTABLE DEVICE | Site: CORONARY | Status: FUNCTIONAL
Brand: XIENCE SKYPOINT™

## 2023-03-08 DEVICE — XIENCE SKYPOINT™ EVEROLIMUS ELUTING CORONARY STENT SYSTEM 3.00 MM X 18 MM / RAPID-EXCHANGE
Type: IMPLANTABLE DEVICE | Site: CORONARY | Status: FUNCTIONAL
Brand: XIENCE SKYPOINT™

## 2023-03-08 RX ORDER — NITROGLYCERIN 20 MG/100ML
10-50 INJECTION INTRAVENOUS
Status: DISCONTINUED | OUTPATIENT
Start: 2023-03-08 | End: 2023-03-10

## 2023-03-08 RX ORDER — METOPROLOL TARTRATE 50 MG/1
50 TABLET, FILM COATED ORAL 2 TIMES DAILY
Status: DISCONTINUED | OUTPATIENT
Start: 2023-03-08 | End: 2023-03-10 | Stop reason: HOSPADM

## 2023-03-08 RX ORDER — LIDOCAINE HYDROCHLORIDE 10 MG/ML
INJECTION, SOLUTION EPIDURAL; INFILTRATION; INTRACAUDAL; PERINEURAL
Status: DISCONTINUED | OUTPATIENT
Start: 2023-03-08 | End: 2023-03-08 | Stop reason: HOSPADM

## 2023-03-08 RX ORDER — LOSARTAN POTASSIUM 25 MG/1
25 TABLET ORAL ONCE
Status: COMPLETED | OUTPATIENT
Start: 2023-03-08 | End: 2023-03-08

## 2023-03-08 RX ORDER — ACETAMINOPHEN 325 MG/1
650 TABLET ORAL EVERY 4 HOURS PRN
Status: DISCONTINUED | OUTPATIENT
Start: 2023-03-08 | End: 2023-03-10 | Stop reason: HOSPADM

## 2023-03-08 RX ORDER — LOSARTAN POTASSIUM 50 MG/1
50 TABLET ORAL
Status: DISCONTINUED | OUTPATIENT
Start: 2023-03-09 | End: 2023-03-10 | Stop reason: HOSPADM

## 2023-03-08 RX ORDER — EPTIFIBATIDE 20 MG/10ML
INJECTION INTRAVENOUS
Status: COMPLETED | OUTPATIENT
Start: 2023-03-08 | End: 2023-03-08

## 2023-03-08 RX ORDER — ASPIRIN 81 MG/1
81 TABLET ORAL DAILY
Status: DISCONTINUED | OUTPATIENT
Start: 2023-03-08 | End: 2023-03-10 | Stop reason: HOSPADM

## 2023-03-08 RX ORDER — AMOXICILLIN 250 MG
2 CAPSULE ORAL 2 TIMES DAILY
Status: DISCONTINUED | OUTPATIENT
Start: 2023-03-09 | End: 2023-03-10 | Stop reason: HOSPADM

## 2023-03-08 RX ORDER — ROSUVASTATIN CALCIUM 10 MG/1
20 TABLET, COATED ORAL NIGHTLY
Status: DISCONTINUED | OUTPATIENT
Start: 2023-03-08 | End: 2023-03-10 | Stop reason: HOSPADM

## 2023-03-08 RX ORDER — POLYETHYLENE GLYCOL 3350 17 G/17G
17 POWDER, FOR SOLUTION ORAL DAILY PRN
Status: DISCONTINUED | OUTPATIENT
Start: 2023-03-08 | End: 2023-03-10 | Stop reason: HOSPADM

## 2023-03-08 RX ORDER — SODIUM CHLORIDE 9 MG/ML
40 INJECTION, SOLUTION INTRAVENOUS AS NEEDED
Status: DISCONTINUED | OUTPATIENT
Start: 2023-03-08 | End: 2023-03-10 | Stop reason: HOSPADM

## 2023-03-08 RX ORDER — FENTANYL CITRATE 50 UG/ML
INJECTION, SOLUTION INTRAMUSCULAR; INTRAVENOUS
Status: DISCONTINUED | OUTPATIENT
Start: 2023-03-08 | End: 2023-03-08 | Stop reason: HOSPADM

## 2023-03-08 RX ORDER — ONDANSETRON 4 MG/1
4 TABLET, FILM COATED ORAL EVERY 6 HOURS PRN
Status: DISCONTINUED | OUTPATIENT
Start: 2023-03-08 | End: 2023-03-10 | Stop reason: HOSPADM

## 2023-03-08 RX ORDER — LOSARTAN POTASSIUM 25 MG/1
25 TABLET ORAL
Status: DISCONTINUED | OUTPATIENT
Start: 2023-03-08 | End: 2023-03-08

## 2023-03-08 RX ORDER — MECLIZINE HYDROCHLORIDE 25 MG/1
12.5 TABLET ORAL 3 TIMES DAILY PRN
Status: DISCONTINUED | OUTPATIENT
Start: 2023-03-08 | End: 2023-03-10 | Stop reason: HOSPADM

## 2023-03-08 RX ORDER — SODIUM CHLORIDE 0.9 % (FLUSH) 0.9 %
10 SYRINGE (ML) INJECTION EVERY 12 HOURS SCHEDULED
Status: DISCONTINUED | OUTPATIENT
Start: 2023-03-08 | End: 2023-03-10 | Stop reason: HOSPADM

## 2023-03-08 RX ORDER — POTASSIUM CHLORIDE 750 MG/1
10 TABLET, FILM COATED, EXTENDED RELEASE ORAL ONCE
Status: COMPLETED | OUTPATIENT
Start: 2023-03-08 | End: 2023-03-08

## 2023-03-08 RX ORDER — NITROGLYCERIN 400 UG/1
SPRAY ORAL
Status: DISCONTINUED | OUTPATIENT
Start: 2023-03-08 | End: 2023-03-08 | Stop reason: HOSPADM

## 2023-03-08 RX ORDER — SODIUM CHLORIDE 0.9 % (FLUSH) 0.9 %
10 SYRINGE (ML) INJECTION AS NEEDED
Status: DISCONTINUED | OUTPATIENT
Start: 2023-03-08 | End: 2023-03-10 | Stop reason: HOSPADM

## 2023-03-08 RX ORDER — BISACODYL 5 MG/1
5 TABLET, DELAYED RELEASE ORAL DAILY PRN
Status: DISCONTINUED | OUTPATIENT
Start: 2023-03-08 | End: 2023-03-10 | Stop reason: HOSPADM

## 2023-03-08 RX ORDER — ISOSORBIDE MONONITRATE 30 MG/1
30 TABLET, EXTENDED RELEASE ORAL DAILY
Status: DISCONTINUED | OUTPATIENT
Start: 2023-03-08 | End: 2023-03-10 | Stop reason: HOSPADM

## 2023-03-08 RX ORDER — NITROGLYCERIN 20 MG/100ML
INJECTION INTRAVENOUS
Status: COMPLETED | OUTPATIENT
Start: 2023-03-08 | End: 2023-03-08

## 2023-03-08 RX ORDER — LEVOTHYROXINE SODIUM 0.05 MG/1
50 TABLET ORAL DAILY
Status: DISCONTINUED | OUTPATIENT
Start: 2023-03-08 | End: 2023-03-10 | Stop reason: HOSPADM

## 2023-03-08 RX ORDER — EPTIFIBATIDE 0.75 MG/ML
INJECTION, SOLUTION INTRAVENOUS
Status: COMPLETED | OUTPATIENT
Start: 2023-03-08 | End: 2023-03-08

## 2023-03-08 RX ORDER — PANTOPRAZOLE SODIUM 40 MG/1
40 TABLET, DELAYED RELEASE ORAL
Status: DISCONTINUED | OUTPATIENT
Start: 2023-03-08 | End: 2023-03-10 | Stop reason: HOSPADM

## 2023-03-08 RX ORDER — MAGNESIUM SULFATE HEPTAHYDRATE 40 MG/ML
2 INJECTION, SOLUTION INTRAVENOUS ONCE
Status: COMPLETED | OUTPATIENT
Start: 2023-03-08 | End: 2023-03-08

## 2023-03-08 RX ORDER — EPTIFIBATIDE 0.75 MG/ML
2 INJECTION, SOLUTION INTRAVENOUS CONTINUOUS
Status: DISPENSED | OUTPATIENT
Start: 2023-03-08 | End: 2023-03-08

## 2023-03-08 RX ORDER — HEPARIN SODIUM 1000 [USP'U]/ML
INJECTION, SOLUTION INTRAVENOUS; SUBCUTANEOUS
Status: DISCONTINUED | OUTPATIENT
Start: 2023-03-08 | End: 2023-03-08 | Stop reason: HOSPADM

## 2023-03-08 RX ORDER — NITROGLYCERIN 5 MG/ML
INJECTION, SOLUTION INTRAVENOUS
Status: DISCONTINUED | OUTPATIENT
Start: 2023-03-08 | End: 2023-03-08 | Stop reason: HOSPADM

## 2023-03-08 RX ORDER — DIPHENHYDRAMINE HCL 25 MG
25 CAPSULE ORAL EVERY 6 HOURS PRN
Status: DISCONTINUED | OUTPATIENT
Start: 2023-03-08 | End: 2023-03-10 | Stop reason: HOSPADM

## 2023-03-08 RX ORDER — HEPARIN SODIUM 5000 [USP'U]/ML
INJECTION, SOLUTION INTRAVENOUS; SUBCUTANEOUS
Status: COMPLETED | OUTPATIENT
Start: 2023-03-08 | End: 2023-03-08

## 2023-03-08 RX ORDER — ONDANSETRON 2 MG/ML
4 INJECTION INTRAMUSCULAR; INTRAVENOUS EVERY 6 HOURS PRN
Status: DISCONTINUED | OUTPATIENT
Start: 2023-03-08 | End: 2023-03-10 | Stop reason: HOSPADM

## 2023-03-08 RX ORDER — BISACODYL 10 MG
10 SUPPOSITORY, RECTAL RECTAL DAILY PRN
Status: DISCONTINUED | OUTPATIENT
Start: 2023-03-08 | End: 2023-03-10 | Stop reason: HOSPADM

## 2023-03-08 RX ADMIN — EPTIFIBATIDE 2 MCG/KG/MIN: 0.75 INJECTION INTRAVENOUS at 04:09

## 2023-03-08 RX ADMIN — LEVOTHYROXINE SODIUM 50 MCG: 50 TABLET ORAL at 05:28

## 2023-03-08 RX ADMIN — NITROGLYCERIN 10 MCG/MIN: 20 INJECTION INTRAVENOUS at 01:10

## 2023-03-08 RX ADMIN — Medication 10 ML: at 21:24

## 2023-03-08 RX ADMIN — ISOSORBIDE MONONITRATE 30 MG: 30 TABLET, EXTENDED RELEASE ORAL at 08:15

## 2023-03-08 RX ADMIN — EPTIFIBATIDE 2 MCG/KG/MIN: 0.75 INJECTION INTRAVENOUS at 01:09

## 2023-03-08 RX ADMIN — TICAGRELOR 90 MG: 90 TABLET ORAL at 21:24

## 2023-03-08 RX ADMIN — EPTIFIBATIDE 2 MCG/KG/MIN: 0.75 INJECTION INTRAVENOUS at 00:08

## 2023-03-08 RX ADMIN — PANTOPRAZOLE SODIUM 40 MG: 40 TABLET, DELAYED RELEASE ORAL at 08:15

## 2023-03-08 RX ADMIN — LOSARTAN POTASSIUM 25 MG: 25 TABLET, FILM COATED ORAL at 17:06

## 2023-03-08 RX ADMIN — MAGNESIUM SULFATE HEPTAHYDRATE 2 G: 2 INJECTION, SOLUTION INTRAVENOUS at 17:06

## 2023-03-08 RX ADMIN — PANTOPRAZOLE SODIUM 40 MG: 40 TABLET, DELAYED RELEASE ORAL at 17:06

## 2023-03-08 RX ADMIN — METOPROLOL TARTRATE 50 MG: 50 TABLET, FILM COATED ORAL at 21:23

## 2023-03-08 RX ADMIN — ROSUVASTATIN 20 MG: 10 TABLET, FILM COATED ORAL at 21:23

## 2023-03-08 RX ADMIN — EPTIFIBATIDE 17586 MCG: 2 INJECTION, SOLUTION INTRAVENOUS at 00:09

## 2023-03-08 RX ADMIN — METOPROLOL TARTRATE 50 MG: 50 TABLET, FILM COATED ORAL at 08:15

## 2023-03-08 RX ADMIN — POTASSIUM CHLORIDE 10 MEQ: 750 TABLET, EXTENDED RELEASE ORAL at 17:06

## 2023-03-08 RX ADMIN — NITROGLYCERIN 10 MCG/MIN: 20 INJECTION INTRAVENOUS at 00:08

## 2023-03-08 RX ADMIN — LOSARTAN POTASSIUM 25 MG: 25 TABLET, FILM COATED ORAL at 09:48

## 2023-03-08 RX ADMIN — TICAGRELOR 90 MG: 90 TABLET ORAL at 13:16

## 2023-03-08 RX ADMIN — ASPIRIN 81 MG: 81 TABLET, COATED ORAL at 08:15

## 2023-03-08 RX ADMIN — HEPARIN SODIUM 4000 UNITS: 5000 INJECTION INTRAVENOUS; SUBCUTANEOUS at 00:07

## 2023-03-08 NOTE — PLAN OF CARE
Goal Outcome Evaluation:                PT A&Ox4, pt complained of some sob with R neck pain. Dr Coreas called and came to bedside, no new orders for pt complaints, Dr Coreas stated it may be related to side effects of Brilinta which are causing slight intermittent sob. See mar for medication changes for losartan. Goals is to try and wean off nitro drip. R groin cath site dry with some old drainage, no new drainage. Vss

## 2023-03-08 NOTE — ED NOTES
Patient reports chest pain since last night.  Reports pain improved after nitro given by ems.  Patient received 324 mg of aspirin prior to arrival.   Physical Therapy Discharge Summary    Reason for therapy discharge:    Discharged to transitional care facility.    Progress towards therapy goal(s). See goals on [facility] Plan in [facility] electronic [facility] record for goal details.  Goals not met.  Barriers to achieving goals:   Assistance with gait and transfers.    Therapy recommendation(s):    Continued therapy is recommended.  Rationale/Recommendations:  TCU.

## 2023-03-08 NOTE — PROGRESS NOTES
[facility] CARDIOLOGY  [facility]        LOS:  LOS: 0 days   Patient Name: Darrick Andrade  Age/Sex: 83 y.o. male  : 1940  MRN: [id number]    Day of Service: 23   Length of Stay: 0  Encounter Provider: LALI Davis  Place of Service: [facility] CARDIOLOGY  Patient Care Team:  Emily Batres APRN as PCP - General  Emily Batres APRN as PCP - Family Medicine    Subjective:     Chief Complaint:  F/U STEMI    Subjective:   Patient sitting up in the chair.  He reports intermittent mild chest pain and dyspnea today, which has improved on nitro gtt.      Current Medications:   Scheduled Meds:aspirin, 81 mg, Oral, Daily  isosorbide mononitrate, 30 mg, Oral, Daily  levothyroxine, 50 mcg, Oral, Daily  losartan, 25 mg, Oral, Q24H  metoprolol tartrate, 50 mg, Oral, BID  pantoprazole, 40 mg, Oral, BID AC  rosuvastatin, 20 mg, Oral, Nightly  [START ON 3/9/2023] senna-docusate sodium, 2 tablet, Oral, BID  sodium chloride, 10 mL, Intravenous, Q12H  ticagrelor, 90 mg, Oral, BID      Continuous Infusions:eptifibatide, 2 mcg/kg/min, Last Rate: Stopped (23 0410)  nitroglycerin, 10-50 mcg/min, Last Rate: 30 mcg/min (23 1410)        Allergies:  No Known Allergies    ROS    Objective:     Temp:  [97.5 °F (36.4 °C)-97.9 °F (36.6 °C)] 97.5 °F (36.4 °C)  Heart Rate:  [85-94] 88  Resp:  [12-20] 19  BP: (126-202)/() 126/87     Intake/Output Summary (Last 24 hours) at 3/8/2023 1531  Last data filed at 3/8/2023 1000  Gross per 24 hour   Intake 240 ml   Output --   Net 240 ml     Body mass index is 30.03 kg/m².      23  2356 23  0227 23  0600   Weight: 97.7 kg (215 lb 6.4 oz) 96.3 kg (212 lb 4.9 oz) 96.3 kg (212 lb 4.9 oz)         Physical Exam:  Neuro:  CV:  Resp:  GI:  Ext:  Tele: AAOx3, no gross deficits  S1S2 RRR, grade 2/6 systolic murmur  Non-labored, CTA  BS+, abd soft  Pedal pulses palp, no  edema, right groin without hematoma  SR / SB with up to 1.77 sec pauses and 8 beat NSVT                                                   Lab Review:   Results from last 7 days   Lab Units 03/08/23  1308 03/08/23  0005   SODIUM mmol/L 138 140   POTASSIUM mmol/L 3.8 4.0   CHLORIDE mmol/L 101 105   CO2 mmol/L 24.0 23.0   BUN mg/dL 15 20   CREATININE mg/dL 0.93 1.10   GLUCOSE mg/dL 138* 119*   CALCIUM mg/dL 9.3 8.9   AST (SGOT) U/L 72* 20   ALT (SGPT) U/L 20 14     Results from last 7 days   Lab Units 03/08/23  0005   HSTROP T ng/L 226*     Results from last 7 days   Lab Units 03/08/23  1308 03/08/23  0005   WBC 10*3/mm3 11.80* 11.10*   HEMOGLOBIN g/dL 15.4 14.3   HEMATOCRIT % 47.5 42.3   PLATELETS 10*3/mm3 261 201     Results from last 7 days   Lab Units 03/08/23  0005   INR  1.00   APTT seconds 27.1*     Results from last 7 days   Lab Units 03/08/23  1308   MAGNESIUM mg/dL 1.7     Results from last 7 days   Lab Units 03/08/23  1308   CHOLESTEROL mg/dL 160   TRIGLYCERIDES mg/dL 168*   HDL CHOL mg/dL 38*         Results from last 7 days   Lab Units 03/08/23  1308   TSH uIU/mL 2.940       Recent Radiology:  Imaging Results (Most Recent)       Procedure Component Value Units Date/Time    XR Chest 1 View [[id number]] Collected: 03/08/23 0647     Updated: 03/08/23 0651    Narrative:      XR CHEST 1 VW    Date of Exam: 3/8/2023 12:14 AM EST    Indication: cp.    Comparison: 12/1/2022 and prior    Findings:  Study limited by overlying support and monitoring apparatus. Lung volumes are low. The heart size appears stable. Pulmonary vascularity is congested with vascular crowding accentuated by low lung volumes and technique. Dependent atelectasis is noted. No   obvious pneumothorax or significant pleural effusion appreciated given limitations of the study.      Impression:      Impression:  Limited low lung volume portable chest with vascular crowding and dependent opacities likely atelectasis. Recommend follow-up as clinically  indicated    Electronically Signed: José Miguel Douglas    3/8/2023 6:49 AM EST    Workstation ID: [id number]            ECHOCARDIOGRAM:          I reviewed the patient's new clinical results.    EKG:      Assessment:       ST elevation myocardial infarction (STEMI), unspecified artery (HCC)    Coronary artery disease involving native coronary artery of native heart    Essential hypertension    Hyperlipidemia LDL goal <70    Overweight (BMI 25.0-29.9)    Acquired hypothyroidism    STEMI (ST elevation myocardial infarction) (HCC)    1) STEMI s/p PCI with AJIT to D2 / LAD 3/8  - off integrillin gtt  - on nitro gtt  - continue on DAPT with aspirin and brilinta  - continue on beta blocker, on high dose statin  - 2D echo pending    2) HTN    3) HLD    4) hypothyroidism    Plan:   Patient is POD#0 STEMI s/p PCI as above.  No post op hematoma.  Post cath renal function stable.  Tele shows SR / SR with up to 1.77 second pauses and 8 beat NSVT.  Replete Mg/K.  Off integrillin.  Patient with c/o intermittent chest pain in setting of uncontrolled BP improved on nitro gtt.  Continue on uninterrupted DAPT.  Continue to observe.        Electronically signed by LALI Davis, 03/08/23, 3:42 PM EST.    Cardiology attending:  Seen and examined.  Chart and labs reviewed.  History and exam findings are verified with above changes noted.  Assessment and plan notated by APC after being formulated by attending consultant.  Note that greater than 50% of the time spent in care of the patient was provided by attending consultant.  Patient is reporting some shortness of breath.  Discussed with patient.  This may be related to ticagrelor.  I have asked the patient to continue with ticagrelor to see if the symptoms dissipate.  He is agreeable.  Otherwise the patient reports no new issues.  Continue to monitor rate and rhythm.

## 2023-03-08 NOTE — PLAN OF CARE
Problem: Adult Inpatient Plan of Care  Goal: Plan of Care Review  Outcome: Ongoing, Progressing  Goal: Patient-Specific Goal (Individualized)  Outcome: Ongoing, Progressing  Goal: Absence of Hospital-Acquired Illness or Injury  Outcome: Ongoing, Progressing  Intervention: Identify and Manage Fall Risk  Recent Flowsheet Documentation  Taken 3/8/2023 0400 by Stacia Peguero, RN  Safety Promotion/Fall Prevention: safety round/check completed  Taken 3/8/2023 0215 by Stacia Peguero, RN  Safety Promotion/Fall Prevention:   safety round/check completed   fall prevention program maintained   lighting adjusted  Intervention: Prevent Skin Injury  Recent Flowsheet Documentation  Taken 3/8/2023 0300 by Stacia Peguero, RN  Body Position:   turned   supine  Goal: Optimal Comfort and Wellbeing  Outcome: Ongoing, Progressing  Intervention: Monitor Pain and Promote Comfort  Recent Flowsheet Documentation  Taken 3/8/2023 0215 by Stacia Peguero, RN  Pain Management Interventions: see MAR  Intervention: Provide Person-Centered Care  Recent Flowsheet Documentation  Taken 3/8/2023 0215 by Stacia Peguero, RN  Trust Relationship/Rapport:   care explained   choices provided   emotional support provided   empathic listening provided   questions answered   thoughts/feelings acknowledged   reassurance provided   questions encouraged  Goal: Readiness for Transition of Care  Outcome: Ongoing, Progressing  Intervention: Mutually Develop Transition Plan  Recent Flowsheet Documentation  Taken 3/8/2023 0223 by Stacia Peguero, RN  Transportation Anticipated: family or friend will provide  Patient/Family Anticipated Services at Transition: none  Patient/Family Anticipates Transition to: home  Taken 3/8/2023 0222 by Stacia Peguero, RN  Equipment Currently Used at Home: cane, straight   Goal Outcome Evaluation:

## 2023-03-08 NOTE — CONSULTS
Referring Provider: Eloisa Adrian MD    Reason for Consultation:  STEMI      Patient Care Team:  Emily Batres APRN as PCP - General  Emily Batres APRN as PCP - Family Medicine      SUBJECTIVE     Chief Complaint:  Chest pain    History of present illness:  Darrick Andrade is a 83 y.o. male with a history of hypertension, hyperlipidemia, CAD who presented to [facility] with complaint of chest pain.   As per the patient he had chest pain the day before and he thought it was soreness from lifting chicken feeD.  It continued into the evening and then subsided after he took some old nitro that he had.  Today he again had that same chest pain which was heaviness in the center of his chest and it was worse.  He spoke to his daughter and she asked him to call EMS.    EKG done by EMS showed lateral STEMI with inferior reciprocal changes.  By the time he got to the ER his chest pain had resolved and his EKG changes had also resolved.  He was brought emergently to the Cath Lab.    He had a cardiac catheterization in 2019 which showed stenosis in the mid LAD at the bifurcation of the large diagonal.  This has been medically managed and he has been stable on medical therapy for this.      Review of systems:    Constitutional: No weakness, fatigue, fever, rigors, chills   Eyes: No vision changes, eye pain   ENT/oropharynx: No difficulty swallowing, sore throat, epistaxis, changes in hearing   Cardiovascular: +chest pain, chest tightness, palpitations, paroxysmal nocturnal dyspnea, orthopnea, diaphoresis, dizziness / syncopal episode   Respiratory: No shortness of breath, dyspnea on exertion, cough, wheezing, hemoptysis   Gastrointestinal: No abdominal pain, nausea, vomiting, diarrhea, bloody stools   Genitourinary: No hematuria, dysuria   Neurological: No headache, tremors, numbness, one-sided weakness    Musculoskeletal: No cramps, myalgias, joint pain, joint swelling   Integument: No rash,  edema        Personal History:      Past Medical History:   Diagnosis Date   • Basal cell carcinoma of skin 9/22/2014   • Coronary artery disease of native artery of native heart with stable angina pectoris (HCC) 7/15/2019   • COVID-19 vaccination declined    • Essential hypertension 7/15/2019   • Gastrointestinal hemorrhage with melena 5/9/2020   • Glaucoma    • Hyperlipidemia LDL goal <70 7/15/2019   • Hypothyroidism        Past Surgical History:   Procedure Laterality Date   • CARDIAC CATHETERIZATION  04/26/2019    No stents placed   • ENDOSCOPY N/A 5/10/2020    Procedure: ESOPHAGOGASTRODUODENOSCOPY  WITH BIOPSY X 1 AREA;  Surgeon: Chava Wood MD;  Location: [facility] ENDOSCOPY;  Service: Gastroenterology;  Laterality: N/A;  POST OP: DUODENAL ULCER, GASTRIC ULCER, HITAL HERNIA, ESOPHAGEAL STRICTURE   • HERNIA REPAIR     • OTHER SURGICAL HISTORY      lump on back of head removed   • TOE SURGERY         Family History   Problem Relation Age of Onset   • Hyperlipidemia Mother    • Hyperlipidemia Father    • Brain cancer Father        Social History     Tobacco Use   • Smoking status: Former   • Smokeless tobacco: Never   Vaping Use   • Vaping Use: Never used   Substance Use Topics   • Alcohol use: No   • Drug use: No        Medications Prior to Admission   Medication Sig Dispense Refill Last Dose   • aspirin 81 MG EC tablet Take 1 tablet by mouth Daily.      • isosorbide mononitrate (IMDUR) 60 MG 24 hr tablet Take 30 mg by mouth Daily.      • levothyroxine (SYNTHROID, LEVOTHROID) 50 MCG tablet Take 50 mcg by mouth Daily.      • losartan (COZAAR) 25 MG tablet Take 12.5 mg by mouth Every Night.      • meclizine (ANTIVERT) 25 MG tablet Take 12.5 mg by mouth 3 (Three) Times a Day As Needed.      • metFORMIN ER (GLUCOPHAGE-XR) 500 MG 24 hr tablet       • metoprolol tartrate (LOPRESSOR) 100 MG tablet Take 50 mg by mouth 2 (Two) Times a Day.      • pantoprazole (PROTONIX) 40 MG EC tablet Take 1 tablet by mouth  "2 (Two) Times a Day Before Meals. 60 tablet 2    • ranolazine (RANEXA) 500 MG 12 hr tablet TAKE ONE TABLET BY MOUTH TWICE A DAY 60 tablet 4    • rosuvastatin (CRESTOR) 20 MG tablet Take 20 mg by mouth Every Night.           Allergies:     Patient has no known allergies.    Scheduled Meds:  Continuous Infusions:eptifibatide, 2 mcg/kg/min, Last Rate: 2 mcg/kg/min (03/08/23 0109)  nitroglycerin, 10-50 mcg/min, Last Rate: 15 mcg/min (03/08/23 0218)      PRN Meds:      OBJECTIVE    Vital Signs  Vitals:    03/08/23 0122 03/08/23 0130 03/08/23 0139 03/08/23 0153   BP: (!) 196/112 (!) 191/110 (!) 202/122 (!) 171/109   Pulse: 92 92 93 94   Resp: 18 16 19 17   Temp:       SpO2: 97% 94% 93% (!) 89%   Weight:       Height:           Flowsheet Rows    Flowsheet Row First Filed Value   Admission Height 179.1 cm (70.5\") Documented at 03/07/2023 2356   Admission Weight 97.7 kg (215 lb 6.4 oz) Documented at 03/07/2023 2356          No intake or output data in the 24 hours ending 03/08/23 0219     Telemetry: Normal sinus rhythm    Physical Exam:  The patient is alert, oriented and in no distress.  Vital signs as noted above.  Head and neck revealed no carotid bruits or jugular venous distention.  No thyromegaly or lymph adenopathy is present  Lungs clear.  No wheezing.  Breath sounds are normal bilaterally.  Heart normal first and second heart sounds. No murmur.  No precordial rub is present.  No gallop is present.  Abdomen soft and nontender.  No organomegaly is present.  Extremities with good peripheral pulses without any pedal edema.  Skin warm and dry.  Musculoskeletal system is grossly normal.  CNS grossly normal.       Results Review:  I have personally reviewed the results from the time of this admission to 3/8/2023 02:19 EST and agree with these findings:  [x]  Laboratory  []  Microbiology  []  Radiology  [x]  EKG/Telemetry   [x]  Cardiology/Vascular   []  Pathology  []  Old records  []  Other:    Most notable findings " include:     Lab Results (last 24 hours)     Procedure Component Value Units Date/Time    POC Activated Clotting Time [[id number]]  (Abnormal) Collected: 03/08/23 0120    Specimen: Arterial Blood Updated: 03/08/23 0157     Activated Clotting Time  311 Seconds      Comment: Serial Number: [id number]:  [id number]       POC Activated Clotting Time [[id number]]  (Abnormal) Collected: 03/08/23 0056    Specimen: Arterial Blood Updated: 03/08/23 0157     Activated Clotting Time  305 Seconds      Comment: Serial Number: [id number]:  [id number]       High Sensitivity Troponin T [[id number]]  (Abnormal) Collected: 03/08/23 0005    Specimen: Blood Updated: 03/08/23 0041     HS Troponin T 226 ng/L     Narrative:      High Sensitive Troponin T Reference Range:  <10.0 ng/L- Negative Female for AMI  <15.0 ng/L- Negative Male for AMI  >=10 - Abnormal Female indicating possible myocardial injury.  >=15 - Abnormal Male indicating possible myocardial injury.   Clinicians would have to utilize clinical acumen, EKG, Troponin, and serial changes to determine if it is an Acute Myocardial Infarction or myocardial injury due to an underlying chronic condition.         Comprehensive Metabolic Panel [[id number]]  (Abnormal) Collected: 03/08/23 0005    Specimen: Blood Updated: 03/08/23 0032     Glucose 119 mg/dL      BUN 20 mg/dL      Creatinine 1.10 mg/dL      Sodium 140 mmol/L      Potassium 4.0 mmol/L      Comment: Slight hemolysis detected by analyzer. Results may be affected.        Chloride 105 mmol/L      CO2 23.0 mmol/L      Calcium 8.9 mg/dL      Total Protein 6.9 g/dL      Albumin 3.7 g/dL      ALT (SGPT) 14 U/L      AST (SGOT) 20 U/L      Alkaline Phosphatase 47 U/L      Total Bilirubin 0.5 mg/dL      Globulin 3.2 gm/dL      A/G Ratio 1.2 g/dL      BUN/Creatinine Ratio 18.2     Anion Gap 12.0 mmol/L      eGFR 67.0 mL/min/1.73     Narrative:      GFR Normal >60  Chronic Kidney Disease <60  Kidney Failure <15    The GFR formula is only  valid for adults with stable renal function between ages 18 and 70.    aPTT [[id number]]  (Abnormal) Collected: 03/08/23 0005    Specimen: Blood Updated: 03/08/23 0030     PTT 27.1 seconds     Protime-INR [[id number]]  (Normal) Collected: 03/08/23 0005    Specimen: Blood Updated: 03/08/23 0030     Protime 10.3 Seconds      INR 1.00    CBC & Differential [[id number]]  (Abnormal) Collected: 03/08/23 0005    Specimen: Blood Updated: 03/08/23 0011    Narrative:      The following orders were created for panel order CBC & Differential.  Procedure                               Abnormality         Status                     ---------                               -----------         ------                     CBC Auto Differential[[id number]]        Abnormal            Final result                 Please view results for these tests on the individual orders.    CBC Auto Differential [[id number]]  (Abnormal) Collected: 03/08/23 0005    Specimen: Blood Updated: 03/08/23 0011     WBC 11.10 10*3/mm3      RBC 4.62 10*6/mm3      Hemoglobin 14.3 g/dL      Hematocrit 42.3 %      MCV 91.6 fL      MCH 31.0 pg      MCHC 33.9 g/dL      RDW 13.6 %      RDW-SD 45.9 fl      MPV 7.8 fL      Platelets 201 10*3/mm3      Neutrophil % 73.5 %      Lymphocyte % 16.6 %      Monocyte % 8.7 %      Eosinophil % 0.8 %      Basophil % 0.4 %      Neutrophils, Absolute 8.20 10*3/mm3      Lymphocytes, Absolute 1.80 10*3/mm3      Monocytes, Absolute 1.00 10*3/mm3      Eosinophils, Absolute 0.10 10*3/mm3      Basophils, Absolute 0.00 10*3/mm3      nRBC 0.1 /100 WBC           Imaging Results (Last 24 Hours)     Procedure Component Value Units Date/Time    XR Chest 1 View [[id number]] Resulted: 03/08/23 0021     Updated: 03/08/23 0021          LAB RESULTS (LAST 7 DAYS)    CBC  Results from last 7 days   Lab Units 03/08/23 0005   WBC 10*3/mm3 11.10*   RBC 10*6/mm3 4.62   HEMOGLOBIN g/dL 14.3   HEMATOCRIT % 42.3   MCV fL 91.6   PLATELETS 10*3/mm3 201        BMP  Results from last 7 days   Lab Units 03/08/23  0005   SODIUM mmol/L 140   POTASSIUM mmol/L 4.0   CHLORIDE mmol/L 105   CO2 mmol/L 23.0   BUN mg/dL 20   CREATININE mg/dL 1.10   GLUCOSE mg/dL 119*       CMP   Results from last 7 days   Lab Units 03/08/23  0005   SODIUM mmol/L 140   POTASSIUM mmol/L 4.0   CHLORIDE mmol/L 105   CO2 mmol/L 23.0   BUN mg/dL 20   CREATININE mg/dL 1.10   GLUCOSE mg/dL 119*   ALBUMIN g/dL 3.7   BILIRUBIN mg/dL 0.5   ALK PHOS U/L 47   AST (SGOT) U/L 20   ALT (SGPT) U/L 14       BNP        TROPONIN  Results from last 7 days   Lab Units 03/08/23  0005   HSTROP T ng/L 226*       CoAg  Results from last 7 days   Lab Units 03/08/23  0005   INR  1.00   APTT seconds 27.1*       Creatinine Clearance  Estimated Creatinine Clearance: 61.2 mL/min (by C-G formula based on SCr of 1.1 mg/dL).    ABG          Radiology  No radiology results for the last day      EKG  I personally viewed and interpreted the patient's EKG/Telemetry data:  ECG 12 Lead Chest Pain   Preliminary Result   HEART RATE= 88  bpm   RR Interval= 680  ms   IL Interval= 244  ms   P Horizontal Axis= 5  deg   P Front Axis= 78  deg   QRSD Interval= 98  ms   QT Interval= 391  ms   QRS Axis= -46  deg   T Wave Axis= 64  deg   - ABNORMAL ECG -   Sinus rhythm   Prolonged IL interval   Left anterior fascicular block   Borderline ST depression, anterior leads   When compared with ECG of 01-Dec-2022 15:26:10,   New conduction abnormality   Electronically Signed By:    Date and Time of Study: 2023-03-07 23:57:43            Echocardiogram:          Stress Test:  Results for orders placed during the hospital encounter of 07/21/21    Stress test with myocardial perfusion one day    Interpretation Summary  · Findings consistent with a normal ECG stress test.  · Left ventricular ejection fraction is normal. (Calculated EF = 70%).  · Myocardial perfusion imaging indicates a normal myocardial perfusion study with no evidence of ischemia.  ·  Impressions are consistent with a low risk study.        Cardiac Catheterization:  No results found for this or any previous visit.        Other:      ASSESSMENT & PLAN:    Principal Problem:    ST elevation myocardial infarction (STEMI), unspecified artery (HCC)  Active Problems:    Essential hypertension    Hyperlipidemia LDL goal <70    STEMI (ST elevation myocardial infarction) (HCC)    Lateral STEMI  He was taken emergently to the lab   Status post primary PCI to D2 and PCI to the mid LAD.  KVNG-3 flow post PCI with no residual stenosis  Continue Integrilin until the bag runs out  Uninterrupted DAPT with aspirin and Brilinta for 12 months  Obtain echocardiogram  Beta-blocker and statin  Admit to ICU  Cardiac rehab at discharge    Hypertension   continue home meds  We will hold losartan for now    Hyperlipidemia  Statin  Check lipid panel        60 minutes critical care time spent in patient care on this date of service including but not limited to preparing to see the patient, obtaining and/or reviewing history, performing medically appropriate examination, ordering tests and procedures, independently interpreting results and documenting information in EHR and counseling and education of patient and family.  It this excludes time spent on other separate services such as performing procedures or test interpretation if applicable      Eloisa Adrian MD  03/08/23  02:19 EST

## 2023-03-08 NOTE — ED PROVIDER NOTES
Subjective   History of Present Illness  82 yo male with hx CAD c/o severe SS CP onset Monday evening that has waxed and waned since that time.  Pain worsened this evening.  EMS responded and transmitted EKG that demonstrated STEMI.  Case was discussed with Dr Adrian with interventional cardiology prehospital cath lab activated.  Upon ED arrival, EMS had given aspirin 324mg and 2 SL NTG with relief of pain.  EKG done, ST elevation resolved, case again discussed with Dr Adrian. Patient denied any hx of dvt/pe, no pleuritic component or cough, no recent trips or travel, no calf pain or leg swelling.        Review of Systems   Constitutional: Positive for diaphoresis.   Cardiovascular: Positive for chest pain.   All other systems reviewed and are negative.      Past Medical History:   Diagnosis Date   • Basal cell carcinoma of skin 9/22/2014   • Coronary artery disease of native artery of native heart with stable angina pectoris (HCC) 7/15/2019   • COVID-19 vaccination declined    • Essential hypertension 7/15/2019   • Gastrointestinal hemorrhage with melena 5/9/2020   • Glaucoma    • Hyperlipidemia LDL goal <70 7/15/2019   • Hypothyroidism        No Known Allergies    Past Surgical History:   Procedure Laterality Date   • CARDIAC CATHETERIZATION  04/26/2019    No stents placed   • ENDOSCOPY N/A 5/10/2020    Procedure: ESOPHAGOGASTRODUODENOSCOPY  WITH BIOPSY X 1 AREA;  Surgeon: Chava Wood MD;  Location: [facility] ENDOSCOPY;  Service: Gastroenterology;  Laterality: N/A;  POST OP: DUODENAL ULCER, GASTRIC ULCER, HITAL HERNIA, ESOPHAGEAL STRICTURE   • HERNIA REPAIR     • OTHER SURGICAL HISTORY      lump on back of head removed   • TOE SURGERY         Family History   Problem Relation Age of Onset   • Hyperlipidemia Mother    • Hyperlipidemia Father    • Brain cancer Father        Social History     Socioeconomic History   • Marital status:    Tobacco Use   • Smoking status: Former   • Smokeless tobacco:  Never   Vaping Use   • Vaping Use: Never used   Substance and Sexual Activity   • Alcohol use: No   • Drug use: No   • Sexual activity: Defer           Objective   Physical Exam  Constitutional:       Appearance: He is well-developed.   HENT:      Head: Normocephalic and atraumatic.   Cardiovascular:      Rate and Rhythm: Normal rate and regular rhythm.      Heart sounds: Normal heart sounds.   Pulmonary:      Effort: Pulmonary effort is normal.      Breath sounds: Normal breath sounds.   Abdominal:      General: Bowel sounds are normal. There is no distension.      Palpations: Abdomen is soft.      Tenderness: There is no abdominal tenderness.   Musculoskeletal:         General: No swelling or tenderness. Normal range of motion.   Skin:     General: Skin is warm and dry.      Capillary Refill: Capillary refill takes less than 2 seconds.   Neurological:      General: No focal deficit present.      Mental Status: He is alert and oriented to person, place, and time.   Psychiatric:         Mood and Affect: Mood normal.         Behavior: Behavior normal.         Procedures           ED Course                                           Medical Decision Making  ST elevation myocardial infarction (STEMI), unspecified artery (HCC): complicated acute illness or injury     Details: cath lab activated prehospital  Amount and/or Complexity of Data Reviewed  Independent Historian: EMS     Details: as per hpi  Labs: ordered.  Radiology: ordered and independent interpretation performed.  ECG/medicine tests: ordered.     Details: EKG interpretation:  NSR, rate 88, no acute st elevation  Discussion of management or test interpretation with external provider(s): As per HPI    Risk  Prescription drug management.  Decision regarding hospitalization.          Final diagnoses:   ST elevation myocardial infarction (STEMI), unspecified artery (HCC)       ED Disposition  ED Disposition     ED Disposition   Decision to Admit    Condition   --     Comment   --             No follow-up provider specified.       Medication List      No changes were made to your prescriptions during this visit.          Paolo Barajas MD  03/08/23 0022

## 2023-03-08 NOTE — CASE MANAGEMENT/SOCIAL WORK
Discharge Planning Assessment   [facility]     Patient Name: Darrick Andrade  MRN: [id number]  Today's Date: 3/8/2023    Admit Date: 3/7/2023    Plan: DC Plan: Anticipate Routine Home alone. Brilinta will be affordable at $47 per month with first month free through meds to bed.   Discharge Needs Assessment     Row Name 03/08/23 1146       Living Environment    People in Home alone    Current Living Arrangements home    Potentially Unsafe Housing Conditions none    Primary Care Provided by self    Provides Primary Care For no one    Family Caregiver if Needed child(wyatt), adult    Family Caregiver Names Daughter Genny Hudson    Quality of Family Relationships unable to assess    Able to Return to Prior Arrangements yes       Resource/Environmental Concerns    Resource/Environmental Concerns none    Transportation Concerns none       Food Insecurity    Within the past 12 months, you worried that your food would run out before you got the money to buy more. Never true    Within the past 12 months, the food you bought just didn't last and you didn't have money to get more. Never true       Transition Planning    Patient/Family Anticipates Transition to home    Patient/Family Anticipated Services at Transition none    Transportation Anticipated family or friend will provide       Discharge Needs Assessment    Readmission Within the Last 30 Days no previous admission in last 30 days    Equipment Currently Used at Home cane, straight    Concerns to be Addressed discharge planning    Anticipated Changes Related to Illness none    Equipment Needed After Discharge none    Provided Post Acute Provider List? N/A    Provided Post Acute Provider Quality & Resource List? N/A    Current Discharge Risk lives alone               Discharge Plan     Row Name 03/08/23 1147       Plan    Plan DC Plan: Anticipate Routine Home alone. Brilinta will be affordable at $47 per month with first month free through meds to bed.    Provided Post  Acute Provider List? N/A    Provided Post Acute Provider Quality & Resource List? N/A    Plan Comments CM spoke with patient at bedside to discuss admission assessment and discharge planning. Patient confirms PCP and pharmacy. Patient confirms he is agreeable to enrolling in meds to bed program. CM enrolled patient and updated pharmacy in [facility].Patient denies any difficulty affording medications at this time. Patient denies any additional needs for services or DME at this time. Patient states [place] [facility].CM spoke with patient’s nurse, intensivist, and NP to obtain clinical updates. Plans to wean nitro gtt and utilize PRN meds if needed for BP control. Home BP meds restarted.CM reached out to OP Pharmacy to run cost analysis on Brilinta (see note). CM informed rounding team and patient of cost. Patient states cost is affordable. Plan to downgrade to PCU level of care. CM will continue to follow for any further needs and adjust discharge plan accordingly. DC Barriers: Nitro gtt, and cardiac monitoring.           Expected Discharge Date and Time     Expected Discharge Date Expected Discharge Time    Mar 9, 2023          Demographic Summary     Row Name 03/08/23 1145       General Information    Admission Type inpatient    Arrived From emergency department;home    Required Notices Provided Important Message from Medicare    Referral Source admission list    Reason for Consult discharge planning    Preferred Language English       Contact Information    Permission Granted to Share Info With                Functional Status     Row Name 03/08/23 1145       Functional Status    Usual Activity Tolerance good    Current Activity Tolerance good       Physical Activity    On average, how many days per week do you engage in moderate to strenuous exercise (like a brisk walk)? 0 days    On average, how many minutes do you engage in exercise at this level? 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      Frances Watson RN     Office Phone: [phone]  Office Cell:     [phone]

## 2023-03-08 NOTE — PHARMACY RECOMMENDATION
"Transitions-of-Care ([facility]) Pharmacy Future COST Assessment:     /Nurse requesting test claim:    Pharmacy ran test claim for the following:     Drug Sig Covered/PA required Patient Copay per month   Brilinta (ticagrelor) 90 mg bid Covered without PA $ $47     Patient Insurance Type: Medicare - this means they are NOT eligible for a monthly discount card in the future (see \"free month\" note below)    Deductible/Medicare Gap Issue? Unknown    Is patient signed up for M2B service?     Is above drug(s) eligible for 1 month free through M2B service? Yes - free coupon is available   If eligible,  or [facility] pharmacy can provide coupon upon request.           For billing questions, reach out to [facility] at [phone]  For M2B questions, reach out to [facility] at [phone]    Cruz Wolff, PharmD   3/8/2023 09:12 EST  "

## 2023-03-08 NOTE — H&P
Critical Care History and Physical   Darrick Andrade : 1940 MRN:[id number] LOS:0 ROOM:      Reason for admission: ST elevation myocardial infarction (STEMI), unspecified artery (HCC)     Assessment / Plan     STEMI (ST elevation myocardial infarction), involving LAD     --Contributing diseases-CAD, Hyperlipidemia  -Underwent urgent cardiac catheterization in which patient had AJIT placed in LAD and diagonal branch  -Echocardiogram ordered per cardiology  -Continue Integrilin x1 bottle  -Continue aspirin, Brilinta  -Beta-blocker initiated per cardiology, ACE inhibitor to be prescribed prior to discharge if necessary, will defer to cardiology  -Lipid panel in a.m., will initiate statin therapy    Chronic conditions: CAD, high blood pressure, hyperlipidemia, hypothyroid  -Continue home medications as appropriate    Obesity  -encourage lifestyle modifications          Nutrition: Diet: Cardiac Diets; Healthy Heart (2-3 Na+); Texture: Regular Texture (IDDSI 7); Fluid Consistency: Thin (IDDSI 0)     DVT Prophylaxis:   Mechanical Order History:     None      Pharmalogical Order History:      Ordered     Dose Route Frequency Stop    23 0040  heparin (porcine) injection  Status:  Discontinued         -- -- Code / Trauma / Sedation Medication 23 0156    23 0007  heparin (porcine) 5000 UNIT/ML injection         -- IV Code / Trauma / Sedation Medication 23 0007                 History of Present illness     A 83 y.o. old male patient with PMH of coronary artery disease, hypertension, hyperlipidemia, hypothyroidism, and overweight presents to the hospital with complaints of chest pain.  Patient states he has had severe substernal chest pain since Monday evening.  He states it would get a little better and then come back.  Initially patient thought this was muscular from lifting his chicken feed over the weekend.  Patient states it progressed to an 8 out of 10 this evening with associated  nausea which prompted him to call EMS.  Prehospital Cath Lab was activated.  Upon arrival to the hospital patient's chest pain had resolved.  He was taken to the Cath Lab where he received a stent to the diagonal and LAD.  Patient was brought to the intensive care for further evaluation and treatment.    ACP: Patient wishes to be full code with full intervention; his daughter is decision-maker if he is unable.    Patient was seen and examined on 03/08/23 at 02:25 EST .    Subjective / Review of systems     Review of Systems   Constitutional: Negative for diaphoresis and fatigue.   HENT: Negative for congestion and sore throat.    Eyes: Negative for pain and redness.   Respiratory: Negative for cough and shortness of breath.    Cardiovascular: Positive for chest pain. Negative for leg swelling.   Gastrointestinal: Negative for abdominal pain, nausea and vomiting.   Endocrine: Negative for polydipsia and polyphagia.   Genitourinary: Negative for dysuria and flank pain.   Musculoskeletal: Negative for back pain and joint swelling.   Skin: Negative for color change and pallor.   Neurological: Negative for dizziness and seizures.   Psychiatric/Behavioral: Negative for behavioral problems and confusion.        Past Medical/Surgical/Social/Family History & Allergies     Past Medical History:   Diagnosis Date   • Basal cell carcinoma of skin 9/22/2014   • Coronary artery disease of native artery of native heart with stable angina pectoris (HCC) 7/15/2019   • COVID-19 vaccination declined    • Essential hypertension 7/15/2019   • Gastrointestinal hemorrhage with melena 5/9/2020   • Glaucoma    • Hyperlipidemia LDL goal <70 7/15/2019   • Hypothyroidism       Past Surgical History:   Procedure Laterality Date   • CARDIAC CATHETERIZATION  04/26/2019    No stents placed   • ENDOSCOPY N/A 5/10/2020    Procedure: ESOPHAGOGASTRODUODENOSCOPY  WITH BIOPSY X 1 AREA;  Surgeon: Chava Wood MD;  Location: [facility] ENDOSCOPY;   Service: Gastroenterology;  Laterality: N/A;  POST OP: DUODENAL ULCER, GASTRIC ULCER, HITAL HERNIA, ESOPHAGEAL STRICTURE   • HERNIA REPAIR     • OTHER SURGICAL HISTORY      lump on back of head removed   • TOE SURGERY        Social History     Socioeconomic History   • Marital status:    Tobacco Use   • Smoking status: Former   • Smokeless tobacco: Never   Vaping Use   • Vaping Use: Never used   Substance and Sexual Activity   • Alcohol use: No   • Drug use: No   • Sexual activity: Defer      Family History   Problem Relation Age of Onset   • Hyperlipidemia Mother    • Hyperlipidemia Father    • Brain cancer Father       No Known Allergies   Social Determinants of Health     Tobacco Use: Medium Risk   • Smoking Tobacco Use: Former   • Smokeless Tobacco Use: Never   • Passive Exposure: Not on file   Alcohol Use: Not At Risk   • Frequency of Alcohol Consumption: Never   • Average Number of Drinks: Patient does not drink   • Frequency of Binge Drinking: Never   Financial Resource Strain: Not on file   Food Insecurity: Not on file   Transportation Needs: Not on file   Physical Activity: Not on file   Stress: Not on file   Social Connections: Not on file   Intimate Partner Violence: Not on file   Depression: Not on file   Housing Stability: Not on file        Home Medications     Prior to Admission medications    Medication Sig Start Date End Date Taking? Authorizing Provider   aspirin 81 MG EC tablet Take 1 tablet by mouth Daily. 10/21/22   Hien Parisi MD   isosorbide mononitrate (IMDUR) 60 MG 24 hr tablet Take 30 mg by mouth Daily.    Martin Field MD   levothyroxine (SYNTHROID, LEVOTHROID) 50 MCG tablet Take 50 mcg by mouth Daily.    Martin Field MD   losartan (COZAAR) 25 MG tablet Take 12.5 mg by mouth Every Night.    Martin Field MD   meclizine (ANTIVERT) 25 MG tablet Take 12.5 mg by mouth 3 (Three) Times a Day As Needed. 11/2/21   Martin Field MD   metFORMIN ER  (GLUCOPHAGE-XR) 500 MG 24 hr tablet  8/15/22   ProviderMartin MD   metoprolol tartrate (LOPRESSOR) 100 MG tablet Take 50 mg by mouth 2 (Two) Times a Day.    Martin Field MD   pantoprazole (PROTONIX) 40 MG EC tablet Take 1 tablet by mouth 2 (Two) Times a Day Before Meals. 5/10/20   Dinesh Ozuna MD   ranolazine (RANEXA) 500 MG 12 hr tablet TAKE ONE TABLET BY MOUTH TWICE A DAY 1/14/21   Hien Parisi MD   rosuvastatin (CRESTOR) 20 MG tablet Take 20 mg by mouth Every Night. 1/14/20   Martin Filed MD        Objective / Physical Exam     Vital signs:  Temp: 97.9 °F (36.6 °C)  BP: (!) 171/109  Heart Rate: 94  Resp: 17  SpO2: (!) 89 %  Weight: 97.7 kg (215 lb 6.4 oz)    Admission Weight: Weight: 97.7 kg (215 lb 6.4 oz)    Physical Exam  Vitals and nursing note reviewed.   Constitutional:       Appearance: Normal appearance. He is obese.   HENT:      Head: Normocephalic.      Nose: Nose normal.      Mouth/Throat:      Mouth: Mucous membranes are moist.      Pharynx: Oropharynx is clear.   Eyes:      Pupils: Pupils are equal, round, and reactive to light.   Cardiovascular:      Rate and Rhythm: Normal rate and regular rhythm.      Pulses: Normal pulses.      Heart sounds: Normal heart sounds.   Pulmonary:      Effort: Pulmonary effort is normal.      Breath sounds: Normal breath sounds.   Abdominal:      General: Bowel sounds are normal.      Palpations: Abdomen is soft.   Musculoskeletal:         General: Normal range of motion.      Cervical back: Normal range of motion.   Skin:     General: Skin is warm and dry.      Capillary Refill: Capillary refill takes 2 to 3 seconds.   Neurological:      Mental Status: He is oriented to person, place, and time.   Psychiatric:         Mood and Affect: Mood normal.         Behavior: Behavior normal.         Thought Content: Thought content normal.         Judgment: Judgment normal.          Labs     Results from last 7 days   Lab Units  03/08/23  0005   WBC 10*3/mm3 11.10*   HEMATOCRIT % 42.3   PLATELETS 10*3/mm3 201      Results from last 7 days   Lab Units 03/08/23  0005   SODIUM mmol/L 140   POTASSIUM mmol/L 4.0   CHLORIDE mmol/L 105   CO2 mmol/L 23.0   BUN mg/dL 20   CREATININE mg/dL 1.10        Imaging     No radiology results for the last day      Current Medications     Scheduled Meds:  aspirin, 81 mg, Oral, Daily  isosorbide mononitrate, 30 mg, Oral, Daily  levothyroxine, 50 mcg, Oral, Daily  metoprolol tartrate, 50 mg, Oral, BID  pantoprazole, 40 mg, Oral, BID AC  rosuvastatin, 20 mg, Oral, Nightly  ticagrelor, 90 mg, Oral, BID         Continuous Infusions:  eptifibatide, 2 mcg/kg/min, Last Rate: 2 mcg/kg/min (03/08/23 0109)  nitroglycerin, 10-50 mcg/min, Last Rate: 15 mcg/min (03/08/23 0218)         Plan discussed with RN. Reviewed all other data in the last 24 hours, including but not limited to vitals, labs, microbiology, imaging and pertinent notes from other providers.  Plan also discussed with patient and his daughter at the bedside.      LALI Crowell   Critical Care  03/08/23   02:25 EST     Electronically signed by LALI Crowell, 03/08/23, 3:10 AM EST.

## 2023-03-09 ENCOUNTER — APPOINTMENT (OUTPATIENT)
Dept: CARDIOLOGY | Facility: HOSPITAL | Age: 83
DRG: 247 | End: 2023-03-09
Payer: MEDICARE

## 2023-03-09 PROBLEM — I21.02 STEMI INVOLVING LEFT ANTERIOR DESCENDING CORONARY ARTERY: Status: ACTIVE | Noted: 2023-03-08

## 2023-03-09 LAB
ALBUMIN SERPL-MCNC: 3.9 G/DL (ref 3.5–5.2)
ALBUMIN/GLOB SERPL: 1.2 G/DL
ALP SERPL-CCNC: 52 U/L (ref 39–117)
ALT SERPL W P-5'-P-CCNC: 20 U/L (ref 1–41)
ANION GAP SERPL CALCULATED.3IONS-SCNC: 12 MMOL/L (ref 5–15)
AST SERPL-CCNC: 67 U/L (ref 1–40)
BASOPHILS # BLD AUTO: 0 10*3/MM3 (ref 0–0.2)
BASOPHILS NFR BLD AUTO: 0.2 % (ref 0–1.5)
BH CV ECHO MEAS - ACS: 0.99 CM
BH CV ECHO MEAS - AO MAX PG: 32 MMHG
BH CV ECHO MEAS - AO MEAN PG: 16 MMHG
BH CV ECHO MEAS - AO ROOT DIAM: 3.3 CM
BH CV ECHO MEAS - AO V2 MAX: 246.6 CM/SEC
BH CV ECHO MEAS - AO V2 VTI: 49.1 CM
BH CV ECHO MEAS - AVA(I,D): 1.63 CM2
BH CV ECHO MEAS - EDV(CUBED): 103.8 ML
BH CV ECHO MEAS - EDV(MOD-SP4): 58.8 ML
BH CV ECHO MEAS - EF(MOD-SP4): 65.1 %
BH CV ECHO MEAS - ESV(CUBED): 15.4 ML
BH CV ECHO MEAS - ESV(MOD-SP4): 20.5 ML
BH CV ECHO MEAS - FS: 47.1 %
BH CV ECHO MEAS - IVS/LVPW: 1.16 CM
BH CV ECHO MEAS - IVSD: 1.44 CM
BH CV ECHO MEAS - LA DIMENSION: 4.9 CM
BH CV ECHO MEAS - LV DIASTOLIC VOL/BSA (35-75): 27.4 CM2
BH CV ECHO MEAS - LV MASS(C)D: 248.6 GRAMS
BH CV ECHO MEAS - LV MAX PG: 4 MMHG
BH CV ECHO MEAS - LV MEAN PG: 2.45 MMHG
BH CV ECHO MEAS - LV SYSTOLIC VOL/BSA (12-30): 9.6 CM2
BH CV ECHO MEAS - LV V1 MAX: 99.7 CM/SEC
BH CV ECHO MEAS - LV V1 VTI: 25.3 CM
BH CV ECHO MEAS - LVIDD: 4.7 CM
BH CV ECHO MEAS - LVIDS: 2.49 CM
BH CV ECHO MEAS - LVOT AREA: 3.2 CM2
BH CV ECHO MEAS - LVOT DIAM: 2.01 CM
BH CV ECHO MEAS - LVPWD: 1.24 CM
BH CV ECHO MEAS - MV A MAX VEL: 120.7 CM/SEC
BH CV ECHO MEAS - MV DEC SLOPE: 297.5 CM/SEC2
BH CV ECHO MEAS - MV DEC TIME: 0.28 MSEC
BH CV ECHO MEAS - MV E MAX VEL: 83 CM/SEC
BH CV ECHO MEAS - MV E/A: 0.69
BH CV ECHO MEAS - MV MAX PG: 6.2 MMHG
BH CV ECHO MEAS - MV MEAN PG: 3.2 MMHG
BH CV ECHO MEAS - MV V2 VTI: 29.1 CM
BH CV ECHO MEAS - MVA(VTI): 2.8 CM2
BH CV ECHO MEAS - PA ACC TIME: 0.1 SEC
BH CV ECHO MEAS - PA PR(ACCEL): 33.8 MMHG
BH CV ECHO MEAS - PA V2 MAX: 82.7 CM/SEC
BH CV ECHO MEAS - PULM A REVS DUR: 0.09 SEC
BH CV ECHO MEAS - PULM A REVS VEL: 33.9 CM/SEC
BH CV ECHO MEAS - PULM DIAS VEL: 34.4 CM/SEC
BH CV ECHO MEAS - PULM S/D: 1.64
BH CV ECHO MEAS - PULM SYS VEL: 56.4 CM/SEC
BH CV ECHO MEAS - RAP SYSTOLE: 3 MMHG
BH CV ECHO MEAS - RV MAX PG: 1.43 MMHG
BH CV ECHO MEAS - RV V1 MAX: 59.7 CM/SEC
BH CV ECHO MEAS - RV V1 VTI: 10.8 CM
BH CV ECHO MEAS - RVDD: 2.9 CM
BH CV ECHO MEAS - RVSP: 17.5 MMHG
BH CV ECHO MEAS - SI(MOD-SP4): 17.8 ML/M2
BH CV ECHO MEAS - SV(LVOT): 80.2 ML
BH CV ECHO MEAS - SV(MOD-SP4): 38.3 ML
BH CV ECHO MEAS - TR MAX PG: 14.5 MMHG
BH CV ECHO MEAS - TR MAX VEL: 190.5 CM/SEC
BILIRUB SERPL-MCNC: 1.1 MG/DL (ref 0–1.2)
BUN SERPL-MCNC: 14 MG/DL (ref 8–23)
BUN/CREAT SERPL: 13.6 (ref 7–25)
CA-I SERPL ISE-MCNC: 1.22 MMOL/L (ref 1.2–1.3)
CALCIUM SPEC-SCNC: 9.2 MG/DL (ref 8.6–10.5)
CHLORIDE SERPL-SCNC: 101 MMOL/L (ref 98–107)
CO2 SERPL-SCNC: 24 MMOL/L (ref 22–29)
CREAT SERPL-MCNC: 1.03 MG/DL (ref 0.76–1.27)
DEPRECATED RDW RBC AUTO: 47.3 FL (ref 37–54)
EGFRCR SERPLBLD CKD-EPI 2021: 72.5 ML/MIN/1.73
EOSINOPHIL # BLD AUTO: 0.1 10*3/MM3 (ref 0–0.4)
EOSINOPHIL NFR BLD AUTO: 0.5 % (ref 0.3–6.2)
ERYTHROCYTE [DISTWIDTH] IN BLOOD BY AUTOMATED COUNT: 13.9 % (ref 12.3–15.4)
GLOBULIN UR ELPH-MCNC: 3.3 GM/DL
GLUCOSE SERPL-MCNC: 162 MG/DL (ref 65–99)
HCT VFR BLD AUTO: 43.5 % (ref 37.5–51)
HGB BLD-MCNC: 15.1 G/DL (ref 13–17.7)
LYMPHOCYTES # BLD AUTO: 1.2 10*3/MM3 (ref 0.7–3.1)
LYMPHOCYTES NFR BLD AUTO: 11.4 % (ref 19.6–45.3)
MAGNESIUM SERPL-MCNC: 1.8 MG/DL (ref 1.6–2.4)
MAXIMAL PREDICTED HEART RATE: 138 BPM
MCH RBC QN AUTO: 31.8 PG (ref 26.6–33)
MCHC RBC AUTO-ENTMCNC: 34.7 G/DL (ref 31.5–35.7)
MCV RBC AUTO: 91.6 FL (ref 79–97)
MONOCYTES # BLD AUTO: 0.8 10*3/MM3 (ref 0.1–0.9)
MONOCYTES NFR BLD AUTO: 7.7 % (ref 5–12)
NEUTROPHILS NFR BLD AUTO: 8.5 10*3/MM3 (ref 1.7–7)
NEUTROPHILS NFR BLD AUTO: 80.2 % (ref 42.7–76)
NRBC BLD AUTO-RTO: 0 /100 WBC (ref 0–0.2)
PHOSPHATE SERPL-MCNC: 2.3 MG/DL (ref 2.5–4.5)
PLATELET # BLD AUTO: 253 10*3/MM3 (ref 140–450)
PMV BLD AUTO: 8.3 FL (ref 6–12)
POTASSIUM SERPL-SCNC: 4 MMOL/L (ref 3.5–5.2)
PROT SERPL-MCNC: 7.2 G/DL (ref 6–8.5)
QT INTERVAL: 391 MS
RBC # BLD AUTO: 4.76 10*6/MM3 (ref 4.14–5.8)
SODIUM SERPL-SCNC: 137 MMOL/L (ref 136–145)
STRESS TARGET HR: 117 BPM
WBC NRBC COR # BLD: 10.6 10*3/MM3 (ref 3.4–10.8)

## 2023-03-09 PROCEDURE — 85025 COMPLETE CBC W/AUTO DIFF WBC: CPT | Performed by: STUDENT IN AN ORGANIZED HEALTH CARE EDUCATION/TRAINING PROGRAM

## 2023-03-09 PROCEDURE — 25010000002 MAGNESIUM SULFATE IN D5W 1G/100ML (PREMIX) 1-5 GM/100ML-% SOLUTION: Performed by: NURSE PRACTITIONER

## 2023-03-09 PROCEDURE — 83735 ASSAY OF MAGNESIUM: CPT | Performed by: INTERNAL MEDICINE

## 2023-03-09 PROCEDURE — 93306 TTE W/DOPPLER COMPLETE: CPT | Performed by: INTERNAL MEDICINE

## 2023-03-09 PROCEDURE — 99232 SBSQ HOSP IP/OBS MODERATE 35: CPT | Performed by: INTERNAL MEDICINE

## 2023-03-09 PROCEDURE — 82330 ASSAY OF CALCIUM: CPT | Performed by: STUDENT IN AN ORGANIZED HEALTH CARE EDUCATION/TRAINING PROGRAM

## 2023-03-09 PROCEDURE — 93306 TTE W/DOPPLER COMPLETE: CPT

## 2023-03-09 PROCEDURE — 80053 COMPREHEN METABOLIC PANEL: CPT | Performed by: STUDENT IN AN ORGANIZED HEALTH CARE EDUCATION/TRAINING PROGRAM

## 2023-03-09 PROCEDURE — 84100 ASSAY OF PHOSPHORUS: CPT | Performed by: STUDENT IN AN ORGANIZED HEALTH CARE EDUCATION/TRAINING PROGRAM

## 2023-03-09 PROCEDURE — 93005 ELECTROCARDIOGRAM TRACING: CPT | Performed by: NURSE PRACTITIONER

## 2023-03-09 RX ORDER — MAGNESIUM SULFATE 1 G/100ML
1 INJECTION INTRAVENOUS ONCE
Status: COMPLETED | OUTPATIENT
Start: 2023-03-09 | End: 2023-03-09

## 2023-03-09 RX ADMIN — SENNOSIDES AND DOCUSATE SODIUM 2 TABLET: 50; 8.6 TABLET ORAL at 08:07

## 2023-03-09 RX ADMIN — LOSARTAN POTASSIUM 50 MG: 50 TABLET, FILM COATED ORAL at 08:08

## 2023-03-09 RX ADMIN — METOPROLOL TARTRATE 50 MG: 50 TABLET, FILM COATED ORAL at 08:08

## 2023-03-09 RX ADMIN — PANTOPRAZOLE SODIUM 40 MG: 40 TABLET, DELAYED RELEASE ORAL at 17:38

## 2023-03-09 RX ADMIN — TICAGRELOR 90 MG: 90 TABLET ORAL at 21:29

## 2023-03-09 RX ADMIN — LEVOTHYROXINE SODIUM 50 MCG: 50 TABLET ORAL at 06:04

## 2023-03-09 RX ADMIN — MAGNESIUM SULFATE IN DEXTROSE 1 G: 10 INJECTION, SOLUTION INTRAVENOUS at 11:06

## 2023-03-09 RX ADMIN — SENNOSIDES AND DOCUSATE SODIUM 2 TABLET: 50; 8.6 TABLET ORAL at 21:28

## 2023-03-09 RX ADMIN — ASPIRIN 81 MG: 81 TABLET, COATED ORAL at 08:08

## 2023-03-09 RX ADMIN — ISOSORBIDE MONONITRATE 30 MG: 30 TABLET, EXTENDED RELEASE ORAL at 08:08

## 2023-03-09 RX ADMIN — Medication 10 ML: at 08:10

## 2023-03-09 RX ADMIN — TICAGRELOR 90 MG: 90 TABLET ORAL at 08:09

## 2023-03-09 RX ADMIN — METOPROLOL TARTRATE 50 MG: 50 TABLET, FILM COATED ORAL at 21:29

## 2023-03-09 RX ADMIN — ROSUVASTATIN 20 MG: 10 TABLET, FILM COATED ORAL at 21:28

## 2023-03-09 RX ADMIN — PANTOPRAZOLE SODIUM 40 MG: 40 TABLET, DELAYED RELEASE ORAL at 08:08

## 2023-03-09 NOTE — CASE MANAGEMENT/SOCIAL WORK
Continued Stay Note  MELI Casey     Patient Name: Darrick Andrade  MRN: [id number]  Today's Date: 3/9/2023    Admit Date: 3/7/2023    Plan: DC Plan: Anticipate Routine Home alone. Brilinta will be affordable at $47 per month with first month free through meds to bed.   Discharge Plan     Row Name 03/09/23 1447       Plan    Plan DC Plan: Anticipate Routine Home alone. Brilinta will be affordable at $47 per month with first month free through meds to bed.    Provided Post Acute Provider List? N/A    Provided Post Acute Provider Quality & Resource List? N/A    Plan Comments CM spoke with patient’s nurse, intensivist, and NP to obtain clinical updates. Plan to downgrade patient to PCU level of care for continued monitoring. CM will continue to follow for any further needs and adjust discharge plan accordingly. DC Barriers: Cardiac monitor post STEMI and stent reopening.           Expected Discharge Date and Time     Expected Discharge Date Expected Discharge Time    Mar 10, 2023         Phone communication or documentation only- no physical contact with patient or family.      Frances Watson RN     Office Phone: [phone]  Office Cell:     [phone]

## 2023-03-09 NOTE — PROGRESS NOTES
"          Cardiology Progress  Note      Patient Care Team:  Emily Batres APRN as PCP - General  Emily Batres APRN as PCP - Family Medicine    PATIENT IDENTIFICATION  Name: Darrick Andrade  Age: 83 y.o.  Sex: male  :  1940  MRN: [id number]             REASON FOR FOLLOW-UP:  Acute ST elevation MI      INTERVAL HISTORY  Patient seen and examined, chart and labs reviewed.  Patient up to bathroom without difficulty.  Nitro drip has been discontinued.  BP, rate/rhythm stable    SUBJECTIVE    He denies any chest discomfort, no further shortness of breath  Denies any nausea or vomiting  No discomfort at cath site      REVIEW OF SYSTEMS:  Pertinent items are noted in HPI, all other systems reviewed and negative    OBJECTIVE   Labs reviewed    ASSESSMENT  Acute ST elevation MI- status post PCI mid LAD/D2  Primary hypertension  Dyslipidemia  Hypothyroidism-acquired      RECOMMENDATIONS  Follow-up TTE  Continue uninterrupted dual antiplatelet therapy consisting of Brilinta 90 mg twice daily, aspirin 81 mg daily.  Continue to monitor for any reperfusion arrhythmia.  Likely discharge tomorrow  May downgrade to PCU level  Continue isosorbide, losartan, BB, statin  Mobilize          Vital Signs  Visit Vitals  /82   Pulse 84   Temp 99.4 °F (37.4 °C) (Oral)   Resp 16   Ht 179.1 cm (70.5\")   Wt 94.9 kg (209 lb 3.5 oz)   SpO2 92%   BMI 29.60 kg/m²     Oxygen Therapy  SpO2: 92 %  Pulse Oximetry Type: Continuous  Device (Oxygen Therapy): nasal cannula  Device (Oxygen Therapy): room air  Flow (L/min): 4  Flowsheet Rows      Flowsheet Row First Filed Value   Admission Height 179.1 cm (70.5\") Documented at 2023 2356   Admission Weight 97.7 kg (215 lb 6.4 oz) Documented at 2023 2356          Intake & Output (last 3 days)          0701   0700  0701   07 07 07 0701  03/10 0700    P.O.   1200     I.V. (mL/kg)   327 (3.4)     Total Intake(mL/kg)   " "1527 (16.1)     Urine (mL/kg/hr)   0 (0)     Stool   0     Total Output   0     Net   +1527             Urine Unmeasured Occurrence   2 x           Lines, Drains & Airways       Active LDAs       Name Placement date Placement time Site Days    Peripheral IV 03/07/23 2355 Left Antecubital 03/07/23  2355  Antecubital  1    Peripheral IV 03/08/23 0012 Right Antecubital 03/08/23  0012  Antecubital  1    Peripheral IV 03/08/23 0006 Left;Posterior Hand 03/08/23  0006  Hand  1                           /82   Pulse 84   Temp 99.4 °F (37.4 °C) (Oral)   Resp 16   Ht 179.1 cm (70.5\")   Wt 94.9 kg (209 lb 3.5 oz)   SpO2 92%   BMI 29.60 kg/m²   Intake/Output last 3 shifts:  I/O last 3 completed shifts:  In: 1527 [P.O.:1200; I.V.:327]  Out: 0   Intake/Output this shift:  No intake/output data recorded.    PHYSICAL EXAM:    General: Alert, cooperative, no distress, appears stated age  Head:  Normocephalic, atraumatic, mucous membranes moist  Eyes:  Conjunctivae/corneas clear, EOM's intact     Neck:  Supple,  no bruit  Lungs:   Clear to auscultation bilaterally, no wheezes, rhonchi or rales are noted  Chest wall: No tenderness  Heart::  Regular rate and rhythm, S1 and S2 normal, 1/6 holosystolic murmur.  No rub or gallop  Abdomen: Soft, nontender, nondistended, bowel sounds active  Extremities: No cyanosis, clubbing, or edema.  Groin site soft with no hematoma palpable  Pulses: 2+ and symmetric all extremities  Skin:  No rashes or lesions  Neuro/psych: A&O x3. CN II through XII are grossly intact with appropriate affect        Scheduled Meds:      aspirin, 81 mg, Oral, Daily  isosorbide mononitrate, 30 mg, Oral, Daily  levothyroxine, 50 mcg, Oral, Daily  losartan, 50 mg, Oral, Q24H  metoprolol tartrate, 50 mg, Oral, BID  pantoprazole, 40 mg, Oral, BID AC  rosuvastatin, 20 mg, Oral, Nightly  senna-docusate sodium, 2 tablet, Oral, BID  sodium chloride, 10 mL, Intravenous, Q12H  ticagrelor, 90 mg, Oral, " BID        Continuous Infusions:    nitroglycerin, 10-50 mcg/min, Last Rate: Stopped (03/09/23 0200)        PRN Meds:      acetaminophen    senna-docusate sodium **AND** polyethylene glycol **AND** bisacodyl **AND** bisacodyl    diphenhydrAMINE    meclizine    ondansetron **OR** ondansetron    sodium chloride    sodium chloride        Results Review:     I reviewed the patient's new clinical results.    CBC    Results from last 7 days   Lab Units 03/08/23  1308 03/08/23  0005   WBC 10*3/mm3 11.80* 11.10*   HEMOGLOBIN g/dL 15.4 14.3   PLATELETS 10*3/mm3 261 201     Cr Clearance Estimated Creatinine Clearance: 71.5 mL/min (by C-G formula based on SCr of 0.93 mg/dL).  Coag   Results from last 7 days   Lab Units 03/08/23  0005   INR  1.00   APTT seconds 27.1*     HbA1C   Lab Results   Component Value Date    HGBA1C 5.70 (H) 03/08/2023    HGBA1C 5.4 05/10/2020     Blood Glucose No results found for: POCGLU  Infection     CMP   Results from last 7 days   Lab Units 03/08/23  1308 03/08/23  0005   SODIUM mmol/L 138 140   POTASSIUM mmol/L 3.8 4.0   CHLORIDE mmol/L 101 105   CO2 mmol/L 24.0 23.0   BUN mg/dL 15 20   CREATININE mg/dL 0.93 1.10   GLUCOSE mg/dL 138* 119*   ALBUMIN g/dL 4.0 3.7   BILIRUBIN mg/dL 0.9 0.5   ALK PHOS U/L 55 47   AST (SGOT) U/L 72* 20   ALT (SGPT) U/L 20 14     ABG      UA      ISABEL  No results found for: POCMETH, POCAMPHET, POCBARBITUR, POCBENZO, POCCOCAINE, POCOPIATES, POCOXYCODO, POCPHENCYC, POCPROPOXY, POCTHC, POCTRICYC  Lysis Labs   Results from last 7 days   Lab Units 03/08/23  1308 03/08/23  0005   INR   --  1.00   APTT seconds  --  27.1*   HEMOGLOBIN g/dL 15.4 14.3   PLATELETS 10*3/mm3 261 201   CREATININE mg/dL 0.93 1.10     Radiology(recent) XR Chest 1 View    Result Date: 3/8/2023  Impression: Limited low lung volume portable chest with vascular crowding and dependent opacities likely atelectasis. Recommend follow-up as clinically indicated Electronically Signed: José Miguel Douglas  3/8/2023  6:49 AM EST  Workstation ID: [id number]        Results from last 7 days   Lab Units 03/08/23  0005   HSTROP T ng/L 226*       Xrays, labs reviewed personally by physician.    ECG/EMG Results (most recent)       Procedure Component Value Units Date/Time    ECG 12 Lead Chest Pain [[id number]] Collected: 03/07/23 2357     Updated: 03/07/23 2358     QT Interval 391 ms     Narrative:      HEART RATE= 88  bpm  RR Interval= 680  ms  AZ Interval= 244  ms  P Horizontal Axis= 5  deg  P Front Axis= 78  deg  QRSD Interval= 98  ms  QT Interval= 391  ms  QRS Axis= -46  deg  T Wave Axis= 64  deg  - ABNORMAL ECG -  Sinus rhythm  Prolonged AZ interval  Left anterior fascicular block  Borderline ST depression, anterior leads  When compared with ECG of 01-Dec-2022 15:26:10,  New conduction abnormality  Electronically Signed By:   Date and Time of Study: 2023-03-07 23:57:43    ECG 12 Lead Other; f/u STEMI [[id number]] Collected: 03/09/23 0218     Updated: 03/09/23 0220     QT Interval 418 ms     Narrative:      HEART RATE= 89  bpm  RR Interval= 672  ms  AZ Interval= 204  ms  P Horizontal Axis= 28  deg  P Front Axis= 44  deg  QRSD Interval= 96  ms  QT Interval= 418  ms  QRS Axis= -38  deg  T Wave Axis= 122  deg  - ABNORMAL ECG -  Sinus rhythm  Left axis deviation  Left anterior fascicular block  Anteroseptal infarct, age indeterminate  Prolonged QT interval  When compared with ECG of 07-Mar-2023 23:57:43,  New or worsened ischemia or infarction  Significant repolarization change  Electronically Signed By:   Date and Time of Study: 2023-03-09 02:18:18              Medication Review:   I have reviewed the patient's current medication list  Scheduled Meds:aspirin, 81 mg, Oral, Daily  isosorbide mononitrate, 30 mg, Oral, Daily  levothyroxine, 50 mcg, Oral, Daily  losartan, 50 mg, Oral, Q24H  metoprolol tartrate, 50 mg, Oral, BID  pantoprazole, 40 mg, Oral, BID AC  rosuvastatin, 20 mg, Oral, Nightly  senna-docusate sodium, 2 tablet, Oral,  "BID  sodium chloride, 10 mL, Intravenous, Q12H  ticagrelor, 90 mg, Oral, BID      Continuous Infusions:nitroglycerin, 10-50 mcg/min, Last Rate: Stopped (03/09/23 0200)      PRN Meds:.  acetaminophen    senna-docusate sodium **AND** polyethylene glycol **AND** bisacodyl **AND** bisacodyl    diphenhydrAMINE    meclizine    ondansetron **OR** ondansetron    sodium chloride    sodium chloride    Imaging:  Imaging Results (Last 72 Hours)       Procedure Component Value Units Date/Time    XR Chest 1 View [[id number]] Collected: 03/08/23 0647     Updated: 03/08/23 0651    Narrative:      XR CHEST 1 VW    Date of Exam: 3/8/2023 12:14 AM EST    Indication: cp.    Comparison: 12/1/2022 and prior    Findings:  Study limited by overlying support and monitoring apparatus. Lung volumes are low. The heart size appears stable. Pulmonary vascularity is congested with vascular crowding accentuated by low lung volumes and technique. Dependent atelectasis is noted. No   obvious pneumothorax or significant pleural effusion appreciated given limitations of the study.      Impression:      Impression:  Limited low lung volume portable chest with vascular crowding and dependent opacities likely atelectasis. Recommend follow-up as clinically indicated    Electronically Signed: José Miguel Douglas    3/8/2023 6:49 AM EST    Workstation ID: [id number]              LALI Owusu  03/09/23  08:01 EST       EMR Dragon/Transcription:   \"Dictated utilizing Dragon dictation\".       Electronically signed by LALI Owusu, 03/09/23, 7:57 AM EST.    Cardiology attending:  Seen and examined.  Chart and labs reviewed.  History and exam findings are verified with above changes noted.  Assessment and plan notated by APC after being formulated by attending consultant.  Note that greater than 50% of the time spent in care of the patient was provided by attending consultant.  No further shortness of breath.  Continue with dual antiplatelet " therapy.  Appropriate for PCU level.  Anticipate discharge soon.  Increase activity as tolerated.

## 2023-03-09 NOTE — PROGRESS NOTES
Critical Care Progress Note     Darrick Andrade : 1940 MRN:[id number] LOS:1  Rm:      Principal Problem: STEMI involving left anterior descending coronary artery (HCC)     Reason for follow up: All the medical problems listed below    Summary     Darrick Andrade is a 83 y.o. male with PMH of CAD, HTN, HLD, hypothyroidism, and overweight, presented to the hospital for chest pain, and was admitted with a principal diagnosis of STEMI involving left anterior descending coronary artery (HCC).  Patient states he has had severe substernal chest pain since Monday evening.  He states it would get a little better and then come back.  Initially patient thought this was muscular from lifting his chicken feed over the weekend.  Patient states it progressed to an 8 out of 10 this evening with associated nausea which prompted him to call EMS.  Prehospital Cath Lab was activated.  Upon arrival to the hospital patient's chest pain had resolved.  He was taken to the Cath Lab where he received a stent to the diagonal and LAD.  Patient was brought to the intensive care for further evaluation and treatment.     ACP: Patient wishes to be full code with full intervention; his daughter is decision-maker if he is unable.    Significant Events     23: Nitro gtt is now off.  Patient denies chest pain, shortness of breath.  Awaiting cardiology recommendations for either discharge or transfer out.  Discussed with cardiology, cardiology ok with patient downgrading to PCU, but would prefer to remain in hospital.  Downgrade to PCU, consult hospitalist for medical management.    Assessment / Plan     STEMI (ST elevation myocardial infarction), involving LAD     --Contributing diseases-CAD, Hyperlipidemia  -Underwent urgent cardiac catheterization in which patient had AJIT placed in LAD and diagonal branch  -Echocardiogram ordered per cardiology  -Continue Integrilin x1 bottle  -Continue aspirin, Brilinta  -Beta-blocker  initiated per cardiology, ACE inhibitor to be prescribed prior to discharge if necessary, will defer to cardiology  -Lipid panel reviewed, continue statin therapy.     Chronic conditions: CAD, high blood pressure, hyperlipidemia, hypothyroid  -Continue home medications as appropriate     Obesity  -encourage lifestyle modifications    Code status:   Code Status (Patient has no pulse and is not breathing): CPR (Attempt to Resuscitate)  Medical Interventions (Patient has pulse or is breathing): Full Support       Nutrition: Diet: Cardiac Diets; Healthy Heart (2-3 Na+); Texture: Regular Texture (IDDSI 7); Fluid Consistency: Thin (IDDSI 0)     DVT prophylaxis:   Mechanical Order History:      Ordered        03/08/23 0226  Place Sequential Compression Device  Once            03/08/23 0226  Maintain Sequential Compression Device  Continuous                    Pharmalogical Order History:      Ordered     Dose Route Frequency Stop    03/08/23 0040  heparin (porcine) injection  Status:  Discontinued         -- -- Code / Trauma / Sedation Medication 03/08/23 0156    03/08/23 0007  heparin (porcine) 5000 UNIT/ML injection         -- IV Code / Trauma / Sedation Medication 03/08/23 0007                 Subjective / Review of systems     Review of Systems   Constitutional: Positive for activity change and fatigue. Negative for chills and fever.   Respiratory: Negative for cough and shortness of breath.    Cardiovascular: Negative for chest pain, palpitations and leg swelling.   Gastrointestinal: Negative for abdominal pain, nausea and vomiting.   Endocrine: Negative for cold intolerance and heat intolerance.   Musculoskeletal: Negative for back pain and myalgias.   Neurological: Negative for weakness and headaches.   Hematological: Negative for adenopathy. Does not bruise/bleed easily.   Psychiatric/Behavioral: Negative for confusion. The patient is not nervous/anxious.         Objective / Physical Exam     Vital signs:  Temp:  98.5 °F (36.9 °C)  BP: 128/80  Heart Rate: 80  Resp: 16  SpO2: 93 %  Weight: 94.9 kg (209 lb 3.5 oz)    Admission Weight: Weight: 97.7 kg (215 lb 6.4 oz)  Current Weight: Weight: 94.9 kg (209 lb 3.5 oz)    Input/Output in last 24 hours:    Intake/Output Summary (Last 24 hours) at 3/9/2023 1158  Last data filed at 3/9/2023 1100  Gross per 24 hour   Intake 1527 ml   Output 0 ml   Net 1527 ml      Net IO Since Admission: 1,767 mL [03/09/23 1158]     Physical Exam  Vitals and nursing note reviewed.   Constitutional:       General: He is not in acute distress.     Appearance: Normal appearance. He is not ill-appearing, toxic-appearing or diaphoretic.   HENT:      Head: Normocephalic and atraumatic.      Nose: Nose normal. No congestion.      Mouth/Throat:      Mouth: Mucous membranes are moist.      Pharynx: Oropharynx is clear. No oropharyngeal exudate.   Eyes:      General: No scleral icterus.     Extraocular Movements: Extraocular movements intact.      Conjunctiva/sclera: Conjunctivae normal.      Pupils: Pupils are equal, round, and reactive to light.   Cardiovascular:      Rate and Rhythm: Normal rate and regular rhythm.      Pulses: Normal pulses.      Heart sounds: Normal heart sounds. No murmur heard.    No gallop.   Pulmonary:      Effort: Pulmonary effort is normal. No respiratory distress.      Breath sounds: Normal breath sounds. No wheezing, rhonchi or rales.   Abdominal:      General: Abdomen is flat. Bowel sounds are normal.      Palpations: Abdomen is soft.      Tenderness: There is no abdominal tenderness.   Musculoskeletal:         General: No tenderness.      Cervical back: Neck supple.      Right lower leg: No edema.      Left lower leg: No edema.   Skin:     General: Skin is warm and dry.   Neurological:      General: No focal deficit present.      Mental Status: He is alert and oriented to person, place, and time.   Psychiatric:         Mood and Affect: Mood normal.         Behavior: Behavior  normal.         Thought Content: Thought content normal.         Judgment: Judgment normal.          Radiology and Labs     Results from last 7 days   Lab Units 03/09/23 0926 03/08/23  1308 03/08/23  0005   WBC 10*3/mm3 10.60 11.80* 11.10*   HEMOGLOBIN g/dL 15.1 15.4 14.3   HEMATOCRIT % 43.5 47.5 42.3   PLATELETS 10*3/mm3 253 261 201      Results from last 7 days   Lab Units 03/09/23 0926 03/08/23  1308 03/08/23  0005   ALK PHOS U/L 52 55 47   AST (SGOT) U/L 67* 72* 20   ALT (SGPT) U/L 20 20 14      Results from last 7 days   Lab Units 03/08/23  0005   PROTIME Seconds 10.3   INR  1.00   APTT seconds 27.1*      Results from last 7 days   Lab Units 03/09/23 0926 03/08/23  1308 03/08/23  0005   SODIUM mmol/L 137 138 140   POTASSIUM mmol/L 4.0 3.8 4.0   CHLORIDE mmol/L 101 101 105   CO2 mmol/L 24.0 24.0 23.0   BUN mg/dL 14 15 20   CREATININE mg/dL 1.03 0.93 1.10   GLUCOSE mg/dL 162* 138* 119*        Current medications     Scheduled Meds:   aspirin, 81 mg, Oral, Daily  isosorbide mononitrate, 30 mg, Oral, Daily  levothyroxine, 50 mcg, Oral, Daily  losartan, 50 mg, Oral, Q24H  metoprolol tartrate, 50 mg, Oral, BID  pantoprazole, 40 mg, Oral, BID AC  rosuvastatin, 20 mg, Oral, Nightly  senna-docusate sodium, 2 tablet, Oral, BID  sodium chloride, 10 mL, Intravenous, Q12H  ticagrelor, 90 mg, Oral, BID        Continuous Infusions:   nitroglycerin, 10-50 mcg/min, Last Rate: Stopped (03/09/23 0200)          Plan discussed with RN. Reviewed all other data in the last 24 hours, including but not limited to vitals, labs, microbiology, imaging and pertinent notes from other providers.  Plan also discussed with patient at the bedside.      LALI Camarillo   Critical Care  03/09/23   11:58 EST

## 2023-03-10 ENCOUNTER — READMISSION MANAGEMENT (OUTPATIENT)
Dept: CALL CENTER | Facility: HOSPITAL | Age: 83
End: 2023-03-10
Payer: MEDICARE

## 2023-03-10 VITALS
HEART RATE: 113 BPM | BODY MASS INDEX: 28.95 KG/M2 | SYSTOLIC BLOOD PRESSURE: 118 MMHG | TEMPERATURE: 97.5 F | RESPIRATION RATE: 16 BRPM | HEIGHT: 71 IN | DIASTOLIC BLOOD PRESSURE: 70 MMHG | OXYGEN SATURATION: 94 % | WEIGHT: 206.8 LBS

## 2023-03-10 LAB
ALBUMIN SERPL-MCNC: 3.8 G/DL (ref 3.5–5.2)
ALBUMIN/GLOB SERPL: 1.2 G/DL
ALP SERPL-CCNC: 52 U/L (ref 39–117)
ALT SERPL W P-5'-P-CCNC: 18 U/L (ref 1–41)
ANION GAP SERPL CALCULATED.3IONS-SCNC: 11 MMOL/L (ref 5–15)
AST SERPL-CCNC: 44 U/L (ref 1–40)
BASOPHILS # BLD AUTO: 0 10*3/MM3 (ref 0–0.2)
BASOPHILS NFR BLD AUTO: 0.2 % (ref 0–1.5)
BILIRUB SERPL-MCNC: 0.9 MG/DL (ref 0–1.2)
BUN SERPL-MCNC: 18 MG/DL (ref 8–23)
BUN/CREAT SERPL: 16.1 (ref 7–25)
CA-I SERPL ISE-MCNC: 1.19 MMOL/L (ref 1.2–1.3)
CALCIUM SPEC-SCNC: 8.9 MG/DL (ref 8.6–10.5)
CHLORIDE SERPL-SCNC: 104 MMOL/L (ref 98–107)
CO2 SERPL-SCNC: 23 MMOL/L (ref 22–29)
CREAT SERPL-MCNC: 1.12 MG/DL (ref 0.76–1.27)
DEPRECATED RDW RBC AUTO: 45.1 FL (ref 37–54)
EGFRCR SERPLBLD CKD-EPI 2021: 65.6 ML/MIN/1.73
EOSINOPHIL # BLD AUTO: 0 10*3/MM3 (ref 0–0.4)
EOSINOPHIL NFR BLD AUTO: 0.4 % (ref 0.3–6.2)
ERYTHROCYTE [DISTWIDTH] IN BLOOD BY AUTOMATED COUNT: 13.3 % (ref 12.3–15.4)
GLOBULIN UR ELPH-MCNC: 3.2 GM/DL
GLUCOSE SERPL-MCNC: 113 MG/DL (ref 65–99)
HCT VFR BLD AUTO: 43.4 % (ref 37.5–51)
HGB BLD-MCNC: 15.2 G/DL (ref 13–17.7)
LYMPHOCYTES # BLD AUTO: 1.4 10*3/MM3 (ref 0.7–3.1)
LYMPHOCYTES NFR BLD AUTO: 12.9 % (ref 19.6–45.3)
MAGNESIUM SERPL-MCNC: 1.6 MG/DL (ref 1.6–2.4)
MCH RBC QN AUTO: 31.8 PG (ref 26.6–33)
MCHC RBC AUTO-ENTMCNC: 35 G/DL (ref 31.5–35.7)
MCV RBC AUTO: 91.1 FL (ref 79–97)
MONOCYTES # BLD AUTO: 0.9 10*3/MM3 (ref 0.1–0.9)
MONOCYTES NFR BLD AUTO: 8.9 % (ref 5–12)
NEUTROPHILS NFR BLD AUTO: 77.6 % (ref 42.7–76)
NEUTROPHILS NFR BLD AUTO: 8.2 10*3/MM3 (ref 1.7–7)
NRBC BLD AUTO-RTO: 0.1 /100 WBC (ref 0–0.2)
PHOSPHATE SERPL-MCNC: 2.4 MG/DL (ref 2.5–4.5)
PLATELET # BLD AUTO: 218 10*3/MM3 (ref 140–450)
PMV BLD AUTO: 8 FL (ref 6–12)
POTASSIUM SERPL-SCNC: 3.7 MMOL/L (ref 3.5–5.2)
PROT SERPL-MCNC: 7 G/DL (ref 6–8.5)
RBC # BLD AUTO: 4.76 10*6/MM3 (ref 4.14–5.8)
SODIUM SERPL-SCNC: 138 MMOL/L (ref 136–145)
WBC NRBC COR # BLD: 10.6 10*3/MM3 (ref 3.4–10.8)

## 2023-03-10 PROCEDURE — 80053 COMPREHEN METABOLIC PANEL: CPT | Performed by: NURSE PRACTITIONER

## 2023-03-10 PROCEDURE — 85025 COMPLETE CBC W/AUTO DIFF WBC: CPT | Performed by: NURSE PRACTITIONER

## 2023-03-10 PROCEDURE — 82330 ASSAY OF CALCIUM: CPT | Performed by: NURSE PRACTITIONER

## 2023-03-10 PROCEDURE — 84100 ASSAY OF PHOSPHORUS: CPT | Performed by: NURSE PRACTITIONER

## 2023-03-10 PROCEDURE — 99232 SBSQ HOSP IP/OBS MODERATE 35: CPT | Performed by: NURSE PRACTITIONER

## 2023-03-10 PROCEDURE — 83735 ASSAY OF MAGNESIUM: CPT | Performed by: NURSE PRACTITIONER

## 2023-03-10 RX ORDER — LOSARTAN POTASSIUM 50 MG/1
50 TABLET ORAL
Qty: 30 TABLET | Refills: 1 | Status: SHIPPED | OUTPATIENT
Start: 2023-03-11 | End: 2023-03-27 | Stop reason: SDUPTHER

## 2023-03-10 RX ORDER — ISOSORBIDE MONONITRATE 30 MG/1
30 TABLET, EXTENDED RELEASE ORAL DAILY
Qty: 30 TABLET | Refills: 0 | Status: SHIPPED | OUTPATIENT
Start: 2023-03-11 | End: 2023-03-27 | Stop reason: SDUPTHER

## 2023-03-10 RX ADMIN — Medication 10 ML: at 01:27

## 2023-03-10 RX ADMIN — LOSARTAN POTASSIUM 50 MG: 50 TABLET, FILM COATED ORAL at 09:04

## 2023-03-10 RX ADMIN — ASPIRIN 81 MG: 81 TABLET, COATED ORAL at 09:05

## 2023-03-10 RX ADMIN — PANTOPRAZOLE SODIUM 40 MG: 40 TABLET, DELAYED RELEASE ORAL at 09:05

## 2023-03-10 RX ADMIN — LEVOTHYROXINE SODIUM 50 MCG: 50 TABLET ORAL at 05:35

## 2023-03-10 RX ADMIN — ISOSORBIDE MONONITRATE 30 MG: 30 TABLET, EXTENDED RELEASE ORAL at 09:04

## 2023-03-10 RX ADMIN — TICAGRELOR 90 MG: 90 TABLET ORAL at 09:05

## 2023-03-10 RX ADMIN — Medication 10 ML: at 09:10

## 2023-03-10 RX ADMIN — METOPROLOL TARTRATE 50 MG: 50 TABLET, FILM COATED ORAL at 09:05

## 2023-03-10 NOTE — CASE MANAGEMENT/SOCIAL WORK
Continued Stay Note  MELI Casey     Patient Name: Darrick Andrade  MRN: [id number]  Today's Date: 3/10/2023    Admit Date: 3/7/2023    Plan: DC Plan: Anticipate Routine Home alone. Brilinta will be affordable at $47 per month with first month free through meds to bed.   Discharge Plan     Row Name 03/10/23 1514       Plan    Plan DC Plan: Anticipate Routine Home alone. Brilinta will be affordable at $47 per month with first month free through meds to bed.    Provided Post Acute Provider List? N/A    Provided Post Acute Provider Quality & Resource List? N/A    Plan Comments CM spoke with patient’s nurse and also reviewed chart documentation to obtain clinical updates. Plan to discharge home this afternoon. Patient will return home with spouse via private vehicle. No barriers.           Expected Discharge Date and Time     Expected Discharge Date Expected Discharge Time    Mar 10, 2023         Phone communication or documentation only- no physical contact with patient or family.      Frances Watson RN     Office Phone: [phone]  Office Cell:     [phone]

## 2023-03-10 NOTE — DISCHARGE SUMMARY
CRITICAL CARE DISCHARGE SUMMARY           PATIENT NAME:  Darrick Andrade             :  1940            MRN:  [id number]    DATE OF ADMISSION: 3/7/2023     DATE OF DISCHARGE: 3/10/2023    DISCHARGE DIAGNOSES:   ST elevation myocardial infarction, involving left anterior descending  Coronary artery disease of native artery of native heart, with unstable angina  Hyperlipidemia  Essential hypertension, chronic  Hypothyroidism  Obesity    PRESENTING PROBLEM/ADMISSION DIAGNOSES:  ST elevation myocardial infarction, involving left anterior descending  Coronary artery disease of native artery of native heart, with unstable angina  Hyperlipidemia  Essential hypertension, chronic  Hypothyroidism  Obesity      HOSPITAL COURSE  Darrick Andrade is a 83 y.o. male with PMH of CAD, HTN, HLD, hypothyroidism, and overweight, presented to the hospital for chest pain, and was admitted with a principal diagnosis of STEMI involving left anterior descending coronary artery (HCC).  Patient states he has had severe substernal chest pain since Monday evening.  He states it would get a little better and then come back.  Initially patient thought this was muscular from lifting his chicken feed over the weekend.  Patient states it progressed to an 8 out of 10 this evening with associated nausea which prompted him to call EMS.  Prehospital Cath Lab was activated.  Upon arrival to the hospital patient's chest pain had resolved.  He was taken to the Cath Lab where he received a stent to the diagonal and LAD.  Patient was brought to the intensive care for further evaluation and treatment.  Patient has been started on dual antiplatelet therapy with aspirin and Brilinta, and had further medical management to adjust his antihypertensive medications.  Changes were reviewed with patient prior to discharge, in which she does understand these changes.  Prescriptions have been submitted for patient, and patient does express verbal  understanding of discharge instructions.  Patient has been deemed medically stable for discharge at this time, and patient is additionally agreeable.  Patient is recommended to follow-up with cardiology in 7-10 days and to follow-up with his PCP for postdischarge follow-up.    PROCEDURES PERFORMED  Procedure(s):  Left Heart Cath  Intravascular Ultrasound  Stent AJIT coronary  Percutaneous Coronary Intervention  -------------------       LABS/RADIOLOGICAL STUDIES  Lab Results (last 72 hours)     Procedure Component Value Units Date/Time    Phosphorus [[id number]]  (Abnormal) Collected: 03/10/23 0558    Specimen: Blood Updated: 03/10/23 0641     Phosphorus 2.4 mg/dL     Comprehensive Metabolic Panel [[id number]]  (Abnormal) Collected: 03/10/23 0558    Specimen: Blood Updated: 03/10/23 0641     Glucose 113 mg/dL      BUN 18 mg/dL      Creatinine 1.12 mg/dL      Sodium 138 mmol/L      Potassium 3.7 mmol/L      Chloride 104 mmol/L      CO2 23.0 mmol/L      Calcium 8.9 mg/dL      Total Protein 7.0 g/dL      Albumin 3.8 g/dL      ALT (SGPT) 18 U/L      AST (SGOT) 44 U/L      Alkaline Phosphatase 52 U/L      Total Bilirubin 0.9 mg/dL      Globulin 3.2 gm/dL      A/G Ratio 1.2 g/dL      BUN/Creatinine Ratio 16.1     Anion Gap 11.0 mmol/L      eGFR 65.6 mL/min/1.73     Narrative:      GFR Normal >60  Chronic Kidney Disease <60  Kidney Failure <15    The GFR formula is only valid for adults with stable renal function between ages 18 and 70.    Magnesium [[id number]]  (Normal) Collected: 03/10/23 0558    Specimen: Blood Updated: 03/10/23 0641     Magnesium 1.6 mg/dL     Calcium, Ionized [[id number]]  (Abnormal) Collected: 03/10/23 0558    Specimen: Blood Updated: 03/10/23 0627     Ionized Calcium 1.19 mmol/L     CBC & Differential [[id number]]  (Abnormal) Collected: 03/10/23 0558    Specimen: Blood Updated: 03/10/23 0622    Narrative:      The following orders were created for panel order CBC & Differential.  Procedure                                Abnormality         Status                     ---------                               -----------         ------                     CBC Auto Differential[[id number]]        Abnormal            Final result                 Please view results for these tests on the individual orders.    CBC Auto Differential [[id number]]  (Abnormal) Collected: 03/10/23 0558    Specimen: Blood Updated: 03/10/23 0622     WBC 10.60 10*3/mm3      RBC 4.76 10*6/mm3      Hemoglobin 15.2 g/dL      Hematocrit 43.4 %      MCV 91.1 fL      MCH 31.8 pg      MCHC 35.0 g/dL      RDW 13.3 %      RDW-SD 45.1 fl      MPV 8.0 fL      Platelets 218 10*3/mm3      Neutrophil % 77.6 %      Lymphocyte % 12.9 %      Monocyte % 8.9 %      Eosinophil % 0.4 %      Basophil % 0.2 %      Neutrophils, Absolute 8.20 10*3/mm3      Lymphocytes, Absolute 1.40 10*3/mm3      Monocytes, Absolute 0.90 10*3/mm3      Eosinophils, Absolute 0.00 10*3/mm3      Basophils, Absolute 0.00 10*3/mm3      nRBC 0.1 /100 WBC     Comprehensive Metabolic Panel [[id number]]  (Abnormal) Collected: 03/09/23 0926    Specimen: Blood Updated: 03/09/23 1000     Glucose 162 mg/dL      BUN 14 mg/dL      Creatinine 1.03 mg/dL      Sodium 137 mmol/L      Potassium 4.0 mmol/L      Chloride 101 mmol/L      CO2 24.0 mmol/L      Calcium 9.2 mg/dL      Total Protein 7.2 g/dL      Albumin 3.9 g/dL      ALT (SGPT) 20 U/L      AST (SGOT) 67 U/L      Alkaline Phosphatase 52 U/L      Total Bilirubin 1.1 mg/dL      Globulin 3.3 gm/dL      A/G Ratio 1.2 g/dL      BUN/Creatinine Ratio 13.6     Anion Gap 12.0 mmol/L      eGFR 72.5 mL/min/1.73     Narrative:      GFR Normal >60  Chronic Kidney Disease <60  Kidney Failure <15    The GFR formula is only valid for adults with stable renal function between ages 18 and 70.    Phosphorus [[id number]]  (Abnormal) Collected: 03/09/23 0926    Specimen: Blood Updated: 03/09/23 0959     Phosphorus 2.3 mg/dL     Magnesium [[id number]]  (Normal) Collected:  03/09/23 0926    Specimen: Blood Updated: 03/09/23 0959     Magnesium 1.8 mg/dL     Calcium, Ionized [[id number]]  (Normal) Collected: 03/09/23 0926    Specimen: Blood Updated: 03/09/23 0953     Ionized Calcium 1.22 mmol/L     CBC & Differential [[id number]]  (Abnormal) Collected: 03/09/23 0926    Specimen: Blood Updated: 03/09/23 0944    Narrative:      The following orders were created for panel order CBC & Differential.  Procedure                               Abnormality         Status                     ---------                               -----------         ------                     CBC Auto Differential[[id number]]        Abnormal            Final result                 Please view results for these tests on the individual orders.    CBC Auto Differential [[id number]]  (Abnormal) Collected: 03/09/23 0926    Specimen: Blood Updated: 03/09/23 0944     WBC 10.60 10*3/mm3      RBC 4.76 10*6/mm3      Hemoglobin 15.1 g/dL      Hematocrit 43.5 %      MCV 91.6 fL      MCH 31.8 pg      MCHC 34.7 g/dL      RDW 13.9 %      RDW-SD 47.3 fl      MPV 8.3 fL      Platelets 253 10*3/mm3      Neutrophil % 80.2 %      Lymphocyte % 11.4 %      Monocyte % 7.7 %      Eosinophil % 0.5 %      Basophil % 0.2 %      Neutrophils, Absolute 8.50 10*3/mm3      Lymphocytes, Absolute 1.20 10*3/mm3      Monocytes, Absolute 0.80 10*3/mm3      Eosinophils, Absolute 0.10 10*3/mm3      Basophils, Absolute 0.00 10*3/mm3      nRBC 0.0 /100 WBC     TSH [[id number]]  (Normal) Collected: 03/08/23 1308    Specimen: Blood Updated: 03/08/23 1404     TSH 2.940 uIU/mL     T4, Free [[id number]]  (Normal) Collected: 03/08/23 1308    Specimen: Blood Updated: 03/08/23 1404     Free T4 1.29 ng/dL     Narrative:      Results may be falsely increased if patient taking Biotin.      Comprehensive Metabolic Panel [[id number]]  (Abnormal) Collected: 03/08/23 1308    Specimen: Blood Updated: 03/08/23 1400     Glucose 138 mg/dL      BUN 15 mg/dL      Creatinine 0.93  mg/dL      Sodium 138 mmol/L      Potassium 3.8 mmol/L      Comment: Slight hemolysis detected by analyzer. Results may be affected.        Chloride 101 mmol/L      CO2 24.0 mmol/L      Calcium 9.3 mg/dL      Total Protein 7.7 g/dL      Albumin 4.0 g/dL      ALT (SGPT) 20 U/L      AST (SGOT) 72 U/L      Alkaline Phosphatase 55 U/L      Total Bilirubin 0.9 mg/dL      Globulin 3.7 gm/dL      A/G Ratio 1.1 g/dL      BUN/Creatinine Ratio 16.1     Anion Gap 13.0 mmol/L      eGFR 82.0 mL/min/1.73     Narrative:      GFR Normal >60  Chronic Kidney Disease <60  Kidney Failure <15    The GFR formula is only valid for adults with stable renal function between ages 18 and 70.    Lipid Panel [[id number]]  (Abnormal) Collected: 03/08/23 1308    Specimen: Blood Updated: 03/08/23 1400     Total Cholesterol 160 mg/dL      Triglycerides 168 mg/dL      HDL Cholesterol 38 mg/dL      LDL Cholesterol  93 mg/dL      VLDL Cholesterol 29 mg/dL      LDL/HDL Ratio 2.33    Narrative:      Cholesterol Reference Ranges  (U.[place] Department of Health and Human Services ATP III Classifications)    Desirable          <200 mg/dL  Borderline High    200-239 mg/dL  High Risk          >240 mg/dL      Triglyceride Reference Ranges  (U.S. Department of Health and Human Services ATP III Classifications)    Normal           <150 mg/dL  Borderline High  150-199 mg/dL  High             200-499 mg/dL  Very High        >500 mg/dL    HDL Reference Ranges  (U.S. Department of Health and Human Services ATP III Classifications)    Low     <40 mg/dl (major risk factor for CHD)  High    >60 mg/dl ('negative' risk factor for CHD)        LDL Reference Ranges  ([place].S. Department of Health and Human Services ATP III Classifications)    Optimal          <100 mg/dL  Near Optimal     100-129 mg/dL  Borderline High  130-159 mg/dL  High             160-189 mg/dL  Very High        >189 mg/dL    Phosphorus [[id number]]  (Normal) Collected: 03/08/23 1308    Specimen: Blood Updated:  03/08/23 1400     Phosphorus 2.7 mg/dL     Magnesium [[id number]]  (Normal) Collected: 03/08/23 1308    Specimen: Blood Updated: 03/08/23 1400     Magnesium 1.7 mg/dL     Hemoglobin A1c [[id number]]  (Abnormal) Collected: 03/08/23 1308    Specimen: Blood Updated: 03/08/23 1352     Hemoglobin A1C 5.70 %     CBC & Differential [[id number]]  (Abnormal) Collected: 03/08/23 1308    Specimen: Blood Updated: 03/08/23 1329    Narrative:      The following orders were created for panel order CBC & Differential.  Procedure                               Abnormality         Status                     ---------                               -----------         ------                     CBC Auto Differential[[id number]]        Abnormal            Final result                 Please view results for these tests on the individual orders.    CBC Auto Differential [[id number]]  (Abnormal) Collected: 03/08/23 1308    Specimen: Blood Updated: 03/08/23 1329     WBC 11.80 10*3/mm3      RBC 5.07 10*6/mm3      Hemoglobin 15.4 g/dL      Hematocrit 47.5 %      MCV 93.8 fL      MCH 30.4 pg      MCHC 32.4 g/dL      RDW 13.9 %      RDW-SD 45.1 fl      MPV 8.0 fL      Platelets 261 10*3/mm3      Neutrophil % 81.4 %      Lymphocyte % 10.1 %      Monocyte % 8.0 %      Eosinophil % 0.4 %      Basophil % 0.1 %      Neutrophils, Absolute 9.60 10*3/mm3      Lymphocytes, Absolute 1.20 10*3/mm3      Monocytes, Absolute 0.90 10*3/mm3      Eosinophils, Absolute 0.00 10*3/mm3      Basophils, Absolute 0.00 10*3/mm3      nRBC 0.0 /100 WBC     POC Activated Clotting Time [[id number]]  (Abnormal) Collected: 03/08/23 0120    Specimen: Arterial Blood Updated: 03/08/23 0157     Activated Clotting Time  311 Seconds      Comment: Serial Number: [id number]:  [id number]       POC Activated Clotting Time [[id number]]  (Abnormal) Collected: 03/08/23 0056    Specimen: Arterial Blood Updated: 03/08/23 0157     Activated Clotting Time  305 Seconds      Comment: Serial Number:  [id number]:  [id number]       High Sensitivity Troponin T [[id number]]  (Abnormal) Collected: 03/08/23 0005    Specimen: Blood Updated: 03/08/23 0041     HS Troponin T 226 ng/L     Narrative:      High Sensitive Troponin T Reference Range:  <10.0 ng/L- Negative Female for AMI  <15.0 ng/L- Negative Male for AMI  >=10 - Abnormal Female indicating possible myocardial injury.  >=15 - Abnormal Male indicating possible myocardial injury.   Clinicians would have to utilize clinical acumen, EKG, Troponin, and serial changes to determine if it is an Acute Myocardial Infarction or myocardial injury due to an underlying chronic condition.         Comprehensive Metabolic Panel [[id number]]  (Abnormal) Collected: 03/08/23 0005    Specimen: Blood Updated: 03/08/23 0032     Glucose 119 mg/dL      BUN 20 mg/dL      Creatinine 1.10 mg/dL      Sodium 140 mmol/L      Potassium 4.0 mmol/L      Comment: Slight hemolysis detected by analyzer. Results may be affected.        Chloride 105 mmol/L      CO2 23.0 mmol/L      Calcium 8.9 mg/dL      Total Protein 6.9 g/dL      Albumin 3.7 g/dL      ALT (SGPT) 14 U/L      AST (SGOT) 20 U/L      Alkaline Phosphatase 47 U/L      Total Bilirubin 0.5 mg/dL      Globulin 3.2 gm/dL      A/G Ratio 1.2 g/dL      BUN/Creatinine Ratio 18.2     Anion Gap 12.0 mmol/L      eGFR 67.0 mL/min/1.73     Narrative:      GFR Normal >60  Chronic Kidney Disease <60  Kidney Failure <15    The GFR formula is only valid for adults with stable renal function between ages 18 and 70.    aPTT [[id number]]  (Abnormal) Collected: 03/08/23 0005    Specimen: Blood Updated: 03/08/23 0030     PTT 27.1 seconds     Protime-INR [[id number]]  (Normal) Collected: 03/08/23 0005    Specimen: Blood Updated: 03/08/23 0030     Protime 10.3 Seconds      INR 1.00    CBC & Differential [[id number]]  (Abnormal) Collected: 03/08/23 0005    Specimen: Blood Updated: 03/08/23 0011    Narrative:      The following orders were created for panel order CBC  "& Differential.  Procedure                               Abnormality         Status                     ---------                               -----------         ------                     CBC Auto Differential[[id number]]        Abnormal            Final result                 Please view results for these tests on the individual orders.    CBC Auto Differential [[id number]]  (Abnormal) Collected: 03/08/23 0005    Specimen: Blood Updated: 03/08/23 0011     WBC 11.10 10*3/mm3      RBC 4.62 10*6/mm3      Hemoglobin 14.3 g/dL      Hematocrit 42.3 %      MCV 91.6 fL      MCH 31.0 pg      MCHC 33.9 g/dL      RDW 13.6 %      RDW-SD 45.9 fl      MPV 7.8 fL      Platelets 201 10*3/mm3      Neutrophil % 73.5 %      Lymphocyte % 16.6 %      Monocyte % 8.7 %      Eosinophil % 0.8 %      Basophil % 0.4 %      Neutrophils, Absolute 8.20 10*3/mm3      Lymphocytes, Absolute 1.80 10*3/mm3      Monocytes, Absolute 1.00 10*3/mm3      Eosinophils, Absolute 0.10 10*3/mm3      Basophils, Absolute 0.00 10*3/mm3      nRBC 0.1 /100 WBC           XR Chest 1 View    Result Date: 3/8/2023  Impression: Limited low lung volume portable chest with vascular crowding and dependent opacities likely atelectasis. Recommend follow-up as clinically indicated Electronically Signed: José Miguel Douglas  3/8/2023 6:49 AM EST  Workstation ID: [id number]      CONSULTS   Consults     Date and Time Order Name Status Description    3/9/2023  4:27 PM Inpatient Hospitalist Consult      3/8/2023 12:34 AM Inpatient Cardiology Consult Completed           CONDITION ON DISCHARGE    Stable    VITAL SIGNS  /70   Pulse 113   Temp 97.5 °F (36.4 °C) (Oral)   Resp 16   Ht 180.3 cm (71\")   Wt 93.8 kg (206 lb 12.8 oz)   SpO2 94%   BMI 28.84 kg/m²     PHYSICAL EXAM  Constitutional:  Well developed, well nourished, no acute distress, non-toxic appearance   Eyes:  PERRL, conjunctiva normal, EOMI   HENT:  Atraumatic, external ears normal, nose normal, oropharynx moist, " no pharyngeal exudates. Neck-normal range of motion, no tenderness, supple, trachea midline  Respiratory: Clear breath sounds throughout, without rhonchi, wheezes, or rales, non-labored respirations without accessory muscle use  Cardiovascular:  Elevated rate, normal rhythm, no murmurs, no gallops, no rubs   GI:  Soft, nondistended, normal bowel sounds, nontender, no organomegaly, no mass, no rebound, no guarding   :  No costovertebral angle tenderness   Musculoskeletal:  No edema, no tenderness, no deformities  Integument:  Well hydrated, no rash   Lymphatic:  No lymphadenopathy noted   Neurologic:  Alert & oriented x 3, CN 2-12 normal, normal motor function, normal sensory function, no focal deficits noted   Psychiatric:  Speech and behavior appropriate       DISCHARGE DISPOSITION  Home or Self Care    DISCHARGE MEDICATIONS     Discharge Medications      New Medications      Instructions Start Date   ticagrelor 90 MG tablet tablet  Commonly known as: BRILINTA   90 mg, Oral, 2 Times Daily         Changes to Medications      Instructions Start Date   isosorbide mononitrate 30 MG 24 hr tablet  Commonly known as: IMDUR  What changed: medication strength   30 mg, Oral, Daily   Start Date: March 11, 2023     losartan 50 MG tablet  Commonly known as: COZAAR  What changed:   · medication strength  · how much to take  · when to take this   50 mg, Oral, Every 24 Hours Scheduled   Start Date: March 11, 2023        Continue These Medications      Instructions Start Date   aspirin 81 MG EC tablet   81 mg, Oral, Daily      levothyroxine 50 MCG tablet  Commonly known as: SYNTHROID, LEVOTHROID   50 mcg, Oral, Daily      meclizine 25 MG tablet  Commonly known as: ANTIVERT   12.5 mg, Oral, 3 Times Daily PRN      metFORMIN  MG 24 hr tablet  Commonly known as: GLUCOPHAGE-XR   No dose, route, or frequency recorded.      metoprolol tartrate 100 MG tablet  Commonly known as: LOPRESSOR   50 mg, Oral, 2 Times Daily       pantoprazole 40 MG EC tablet  Commonly known as: PROTONIX   40 mg, Oral, 2 Times Daily Before Meals      ranolazine 500 MG 12 hr tablet  Commonly known as: RANEXA   TAKE ONE TABLET BY MOUTH TWICE A DAY      rosuvastatin 20 MG tablet  Commonly known as: CRESTOR   20 mg, Oral, Nightly               DISCHARGE DIET   Diet Instructions     Diet: Cardiac; Thin      Discharge Diet: Cardiac    Fluid Consistency: Thin          ACTIVITY AT DISCHARGE   Activity Instructions     Gradually Increase Activity Until at Pre-Hospitalization Level      Lifting Restrictions      Type of Restriction: Lifting    Lifting Restrictions: Lifting Restriction (Indicate Limit)    Weight Limit (Pounds): 5    Length of Lifting Restriction: For 5 days, included pushing or pulling.            FOLLOW-UP APPOINTMENTS  Future Appointments   Date Time Provider Department Center   4/21/2023 11:00 AM Hien Parisi MD [facility]     Additional Instructions for the Follow-ups that You Need to Schedule     Discharge Follow-up with PCP   As directed       Currently Documented PCP:    Emily Batres APRN    PCP Phone Number:    [phone]     Follow Up Details: Follow up in 1-2 weeks post discharge.         Discharge Follow-up with Specified Provider: Dr. Parisi; 1 Week   As directed      To: Dr. Parisi    Follow Up: 1 Week    Follow Up Details: Follow up in 7-10 days.                 TEST RESULTS PENDING AT DISCHARGE  None    This note completed by this nurse practitioner on behalf of of the critical care team, at the request and under the direction of Dr. Wynne, attending intensivist, who agrees with discharge at this time.    Time: Discharge 37 min    LALI Camarillo  3/10/2023

## 2023-03-10 NOTE — PROGRESS NOTES
"          Cardiology Progress  Note      Patient Care Team:  Emily Batres APRN as PCP - General  Emily Batres APRN as PCP - Family Medicine    PATIENT IDENTIFICATION  Name: Darrick Andrade  Age: 83 y.o.  Sex: male  :  1940  MRN: [id number]             REASON FOR FOLLOW-UP:  Acute ST elevation MI      INTERVAL HISTORY  Patient seen and examined, chart and labs reviewed.  Patient has been sleeping on sofa, states bed is uncomfortable.    TTE with normal LV systolic function EF 61 to 65%, mild concentric LVH, grade 1 DD, mild AS    SUBJECTIVE    He denies any chest discomfort, no further shortness of breath  Denies any nausea or vomiting  No discomfort at cath site      REVIEW OF SYSTEMS:  Pertinent items are noted in HPI, all other systems reviewed and negative    OBJECTIVE   Labs reviewed    ASSESSMENT  Acute ST elevation MI- status post PCI mid LAD/D2  Primary hypertension  Dyslipidemia  Hypothyroidism-acquired      RECOMMENDATIONS  Follow-up TTE  Continue uninterrupted dual antiplatelet therapy consisting of Brilinta 90 mg twice daily, aspirin 81 mg daily.  No reperfusion arrhythmia.    Continue isosorbide, losartan, BB, statin  Patient stable for discharge home today.  Follow-up our office 7-10 days          Vital Signs  Visit Vitals  /70   Pulse 113   Temp 97.5 °F (36.4 °C) (Oral)   Resp 16   Ht 180.3 cm (71\")   Wt 93.8 kg (206 lb 12.8 oz)   SpO2 94%   BMI 28.84 kg/m²     Oxygen Therapy  SpO2: 94 %  Pulse Oximetry Type: Intermittent  Device (Oxygen Therapy): nasal cannula  Device (Oxygen Therapy): room air  Flow (L/min): 4  Flowsheet Rows    Flowsheet Row First Filed Value   Admission Height 179.1 cm (70.5\") Documented at 2023 2356   Admission Weight 97.7 kg (215 lb 6.4 oz) Documented at 2023 2356        Intake & Output (last 3 days)        07 07 07 07 0701  03/10 0700 03/10 0701   0700    P.O.  1200 240     I.V. (mL/kg) " " 327 (3.4) 0 (0)     Total Intake(mL/kg)  1527 (16.1) 240 (2.6)     Urine (mL/kg/hr)  0 (0)      Stool  0      Total Output  0      Net  +1527 +240             Urine Unmeasured Occurrence  2 x      Stool Unmeasured Occurrence   1 x         Lines, Drains & Airways     Active LDAs     Name Placement date Placement time Site Days    Peripheral IV 03/07/23 2355 Left Antecubital 03/07/23 2355  Antecubital  1    Peripheral IV 03/08/23 0012 Right Antecubital 03/08/23  0012  Antecubital  1    Peripheral IV 03/08/23 0006 Left;Posterior Hand 03/08/23  0006  Hand  1                       /70   Pulse 113   Temp 97.5 °F (36.4 °C) (Oral)   Resp 16   Ht 180.3 cm (71\")   Wt 93.8 kg (206 lb 12.8 oz)   SpO2 94%   BMI 28.84 kg/m²   Intake/Output last 3 shifts:  I/O last 3 completed shifts:  In: 927 [P.O.:600; I.V.:327]  Out: 0   Intake/Output this shift:  No intake/output data recorded.    PHYSICAL EXAM:    General: Alert, cooperative, no distress, appears stated age  Head:  Normocephalic, atraumatic, mucous membranes moist  Eyes:  Conjunctivae/corneas clear, EOM's intact     Neck:  Supple,  no JVD or bruit  Lungs:   Clear to auscultation bilaterally, no wheezes, rhonchi or rales are noted  Chest wall: No tenderness  Heart::  Regular rate and rhythm, S1 and S2 normal, no murmur, rub or gallop  Abdomen: Soft, nontender, nondistended, bowel sounds active  Extremities: No cyanosis, clubbing, or edema.  Groin site soft with no hematoma palpable  Pulses: 2+ and symmetric all extremities  Skin:  No rashes or lesions  Neuro/psych: A&O x3. CN II through XII are grossly intact with appropriate affect        Scheduled Meds:      aspirin, 81 mg, Oral, Daily  isosorbide mononitrate, 30 mg, Oral, Daily  levothyroxine, 50 mcg, Oral, Daily  losartan, 50 mg, Oral, Q24H  metoprolol tartrate, 50 mg, Oral, BID  pantoprazole, 40 mg, Oral, BID AC  rosuvastatin, 20 mg, Oral, Nightly  senna-docusate sodium, 2 tablet, Oral, BID  sodium " chloride, 10 mL, Intravenous, Q12H  ticagrelor, 90 mg, Oral, BID        Continuous Infusions:         PRN Meds:    •  acetaminophen  •  senna-docusate sodium **AND** polyethylene glycol **AND** bisacodyl **AND** bisacodyl  •  diphenhydrAMINE  •  meclizine  •  ondansetron **OR** ondansetron  •  sodium chloride  •  sodium chloride        Results Review:     I reviewed the patient's new clinical results.    CBC    Results from last 7 days   Lab Units 03/10/23  0558 03/09/23  0926 03/08/23  1308 03/08/23  0005   WBC 10*3/mm3 10.60 10.60 11.80* 11.10*   HEMOGLOBIN g/dL 15.2 15.1 15.4 14.3   PLATELETS 10*3/mm3 218 253 261 201     Cr Clearance Estimated Creatinine Clearance: 59.5 mL/min (by C-G formula based on SCr of 1.12 mg/dL).  Coag   Results from last 7 days   Lab Units 03/08/23  0005   INR  1.00   APTT seconds 27.1*     HbA1C   Lab Results   Component Value Date    HGBA1C 5.70 (H) 03/08/2023    HGBA1C 5.4 05/10/2020     Blood Glucose No results found for: POCGLU  Infection     CMP   Results from last 7 days   Lab Units 03/10/23  0558 03/09/23  0926 03/08/23  1308 03/08/23  0005   SODIUM mmol/L 138 137 138 140   POTASSIUM mmol/L 3.7 4.0 3.8 4.0   CHLORIDE mmol/L 104 101 101 105   CO2 mmol/L 23.0 24.0 24.0 23.0   BUN mg/dL 18 14 15 20   CREATININE mg/dL 1.12 1.03 0.93 1.10   GLUCOSE mg/dL 113* 162* 138* 119*   ALBUMIN g/dL 3.8 3.9 4.0 3.7   BILIRUBIN mg/dL 0.9 1.1 0.9 0.5   ALK PHOS U/L 52 52 55 47   AST (SGOT) U/L 44* 67* 72* 20   ALT (SGPT) U/L 18 20 20 14     ABG      UA      ISABEL  No results found for: POCMETH, POCAMPHET, POCBARBITUR, POCBENZO, POCCOCAINE, POCOPIATES, POCOXYCODO, POCPHENCYC, POCPROPOXY, POCTHC, POCTRICYC  Lysis Labs   Results from last 7 days   Lab Units 03/10/23  0558 03/09/23  0926 03/08/23  1308 03/08/23  0005   INR   --   --   --  1.00   APTT seconds  --   --   --  27.1*   HEMOGLOBIN g/dL 15.2 15.1 15.4 14.3   PLATELETS 10*3/mm3 218 253 261 201   CREATININE mg/dL 1.12 1.03 0.93 1.10      Radiology(recent) No radiology results for the last day      Results from last 7 days   Lab Units 03/08/23  0005   HSTROP T ng/L 226*       Xrays, labs reviewed personally by physician.    ECG/EMG Results (most recent)     Procedure Component Value Units Date/Time    ECG 12 Lead Other; f/u STEMI [[id number]] Collected: 03/09/23 0218     Updated: 03/09/23 0220     QT Interval 418 ms     Narrative:      HEART RATE= 89  bpm  RR Interval= 672  ms  MO Interval= 204  ms  P Horizontal Axis= 28  deg  P Front Axis= 44  deg  QRSD Interval= 96  ms  QT Interval= 418  ms  QRS Axis= -38  deg  T Wave Axis= 122  deg  - ABNORMAL ECG -  Sinus rhythm  Left axis deviation  Left anterior fascicular block  Anteroseptal infarct, age indeterminate  Prolonged QT interval  When compared with ECG of 07-Mar-2023 23:57:43,  New or worsened ischemia or infarction  Significant repolarization change  Electronically Signed By:   Date and Time of Study: 2023-03-09 02:18:18    ECG 12 Lead Chest Pain [[id number]] Collected: 03/07/23 2357     Updated: 03/09/23 0957     QT Interval 391 ms     Narrative:      HEART RATE= 88  bpm  RR Interval= 680  ms  MO Interval= 244  ms  P Horizontal Axis= 5  deg  P Front Axis= 78  deg  QRSD Interval= 98  ms  QT Interval= 391  ms  QRS Axis= -46  deg  T Wave Axis= 64  deg  - ABNORMAL ECG -  Sinus rhythm  Prolonged MO interval  Left anterior fascicular block  Borderline ST depression, anterior leads  When compared with ECG of 01-Dec-2022 15:26:10,  New conduction abnormality  Electronically Signed By: Chris Lin ([facility]) 09-Mar-2023 09:57:13  Date and Time of Study: 2023-03-07 23:57:43    Adult Transthoracic Echo Complete W/ Cont if Necessary Per Protocol [[id number]] Resulted: 03/09/23 1503     Updated: 03/09/23 1509     Target HR (85%) 117 bpm      Max. Pred. HR (100%) 138 bpm      LVIDd 4.7 cm      LVIDs 2.49 cm      IVSd 1.44 cm      LVPWd 1.24 cm      FS 47.1 %      IVS/LVPW 1.16 cm      ESV(cubed) 15.4 ml      LV  Sys Vol (BSA corrected) 9.6 cm2      EDV(cubed) 103.8 ml      LV Nino Vol (BSA corrected) 27.4 cm2      LVOT area 3.2 cm2      LV mass(C)d 248.6 grams      LVOT diam 2.01 cm      EDV(MOD-sp4) 58.8 ml      ESV(MOD-sp4) 20.5 ml      SV(MOD-sp4) 38.3 ml      SI(MOD-sp4) 17.8 ml/m2      EF(MOD-sp4) 65.1 %      MV E max delmer 83.0 cm/sec      MV A max delmer 120.7 cm/sec      MV dec time 0.28 msec      MV E/A 0.69     Pulm A Revs Dur 0.09 sec      SV(LVOT) 80.2 ml      RVIDd 2.9 cm      Pulm Sys Delmer 56.4 cm/sec      Pulm Nino Delmer 34.4 cm/sec      Pulm S/D 1.64     Pulm A Revs Delmer 33.9 cm/sec      LV V1 max 99.7 cm/sec      LV V1 max PG 4.0 mmHg      LV V1 mean PG 2.45 mmHg      LV V1 VTI 25.3 cm      Ao pk delmer 246.6 cm/sec      Ao max PG 32 mmHg      Ao mean PG 16 mmHg      Ao V2 VTI 49.1 cm      MIKEL(I,D) 1.63 cm2      MV max PG 6.2 mmHg      MV mean PG 3.2 mmHg      MV V2 VTI 29.1 cm      MVA(VTI) 2.8 cm2      MV dec slope 297.5 cm/sec2      TR max delmer 190.5 cm/sec      TR max PG 14.5 mmHg      RVSP(TR) 17.5 mmHg      RAP systole 3.0 mmHg      RV V1 max PG 1.43 mmHg      RV V1 max 59.7 cm/sec      RV V1 VTI 10.8 cm      PA V2 max 82.7 cm/sec      PA acc time 0.10 sec      PA pr(Accel) 33.8 mmHg      Ao root diam 3.3 cm      ACS 0.99 cm      LA dimension (2D)  4.9 cm     Narrative:      •  Left ventricular systolic function is normal. Left ventricular ejection   fraction appears to be 61 - 65%.  •  Left ventricular wall thickness is consistent with mild concentric   hypertrophy.  •  Left ventricular diastolic function is consistent with (grade I)   impaired relaxation.  •  Mild aortic valve stenosis is present.  •  Aortic valve maximum pressure gradient is 32 mmHg. Aortic valve mean   pressure gradient is 16 mmHg.  •  There is mild, bileaflet mitral valve thickening present.  •  Estimated right ventricular systolic pressure from tricuspid   regurgitation is normal (<35 mmHg).  •  The study is technically difficult for  "diagnosis.              Medication Review:   I have reviewed the patient's current medication list  Scheduled Meds:aspirin, 81 mg, Oral, Daily  isosorbide mononitrate, 30 mg, Oral, Daily  levothyroxine, 50 mcg, Oral, Daily  losartan, 50 mg, Oral, Q24H  metoprolol tartrate, 50 mg, Oral, BID  pantoprazole, 40 mg, Oral, BID AC  rosuvastatin, 20 mg, Oral, Nightly  senna-docusate sodium, 2 tablet, Oral, BID  sodium chloride, 10 mL, Intravenous, Q12H  ticagrelor, 90 mg, Oral, BID      Continuous Infusions:   PRN Meds:.•  acetaminophen  •  senna-docusate sodium **AND** polyethylene glycol **AND** bisacodyl **AND** bisacodyl  •  diphenhydrAMINE  •  meclizine  •  ondansetron **OR** ondansetron  •  sodium chloride  •  sodium chloride    Imaging:  Imaging Results (Last 72 Hours)     Procedure Component Value Units Date/Time    XR Chest 1 View [[id number]] Collected: 03/08/23 0647     Updated: 03/08/23 0651    Narrative:      XR CHEST 1 VW    Date of Exam: 3/8/2023 12:14 AM EST    Indication: cp.    Comparison: 12/1/2022 and prior    Findings:  Study limited by overlying support and monitoring apparatus. Lung volumes are low. The heart size appears stable. Pulmonary vascularity is congested with vascular crowding accentuated by low lung volumes and technique. Dependent atelectasis is noted. No   obvious pneumothorax or significant pleural effusion appreciated given limitations of the study.      Impression:      Impression:  Limited low lung volume portable chest with vascular crowding and dependent opacities likely atelectasis. Recommend follow-up as clinically indicated    Electronically Signed: José Miguel Douglas    3/8/2023 6:49 AM EST    Workstation ID: [id number]            LALI Owusu  03/10/23  13:59 EST       EMR Dragon/Transcription:   \"Dictated utilizing Dragon dictation\".       Electronically signed by LALI Owusu, 03/10/23, 1:59 PM EST.    "

## 2023-03-11 NOTE — OUTREACH NOTE
Prep Survey    Flowsheet Row Responses   [facility] facility patient discharged from? [facility]   Is LACE score < 7 ? No   Eligibility Readm Mgmt   Discharge diagnosis STEMI   Does the patient have one of the following disease processes/diagnoses(primary or secondary)? Acute MI (STEMI,NSTEMI)   Does the patient have Home health ordered? No   Is there a DME ordered? No   Medication alerts for this patient Cole - Brilinta    Prep survey completed? Yes          Elvira JOE - Registered Nurse

## 2023-03-13 LAB — QT INTERVAL: 418 MS

## 2023-03-14 ENCOUNTER — READMISSION MANAGEMENT (OUTPATIENT)
Dept: CALL CENTER | Facility: HOSPITAL | Age: 83
End: 2023-03-14
Payer: MEDICARE

## 2023-03-14 NOTE — OUTREACH NOTE
AMI Week 1 Survey    Flowsheet Row Responses   [facility] facility patient discharged from? [facility]   Does the patient have one of the following disease processes/diagnoses(primary or secondary)? Acute MI (STEMI,NSTEMI)   Week 1 attempt successful? No   Unsuccessful attempts Attempt 1   Discharge diagnosis STEMI          TORRI MITCHELL - Registered Nurse   [facility] EMERGENCY DEPT VISIT      Patient Identification  Jesus Guerra is a 80 y.o. female. Chief Complaint   Ankle Pain (Pt c/o L ankle pain after turning quickly last night and hearing \"pop\". + swelling. + CMS.)      History of Present Illness: This is a  80 y.o. female who presents ambulatory  to the ED with complaints of left ankle pain after twisting ankle walking on uneven gravel parking lot. felt a pop and had pain. No fall. Pain lateral aspect of foot and ankle. No numbness. Able to bear weight. Past Medical History:   Diagnosis Date    Diabetes mellitus ([address]     GERD (gastroesophageal reflux disease)     Hyperlipidemia     Hypertension     Obesity     S/P hip replacement     Sleep apnea        Past Surgical History:   Procedure Laterality Date    COLONOSCOPY  9/28/2021    COLONOSCOPY POLYPECTOMY SNARE/COLD BIOPSY performed by Alma Short MD at [address] Left Oct '13    UPPER GASTROINTESTINAL ENDOSCOPY N/A 9/28/2021    EGD BIOPSY performed by Alma Short MD at [address] [place] [address] N/A 10/13/2021    ESOPHAGOGASTRODUODENOSCOPY RADIOFREQUENCY ABLATION performed by Alma Short MD at [address] [place] [address] N/A 10/13/2021    EGD BIOPSY performed by Alma Short MD at [place] [address]       No current facility-administered medications for this encounter.     Current Outpatient Medications:     minocycline (MINOCIN;DYNACIN) 50 MG capsule, TAKE 1 CAPSULE BY MOUTH TWICE A DAY, Disp: 60 capsule, Rfl: 0    glimepiride (AMARYL) 4 MG tablet, TAKE 1 TABLET EVERY MORNING, Disp: 90 tablet, Rfl: 2    blood glucose test strips (TRUE METRIX BLOOD GLUCOSE TEST) strip, Test glucose  ONCE a day, Disp: 200 each, Rfl: 5    canagliflozin-metFORMIN HCl (INVOKAMET) 150-500 MG, Take 1 tablet by mouth 2 times daily (with meals), Disp: 60 tablet, Rfl: 3    Dulaglutide (TRULICITY) 4.72 IZ/2.0CE SOPN, ADMINISTER 0.5 ML UNDER THE SKIN 1 TIME WEEKLY, Disp: 2 mL, Rfl: 5    metFORMIN (GLUCOPHAGE-XR) 500 MG extended release tablet, TAKE 2 TABLETS BY MOUTH TWICE A DAY, Disp: 180 tablet, Rfl: 1    pantoprazole (PROTONIX) 40 MG tablet, TAKE 1 TABLET BY MOUTH DAILY, Disp: 90 tablet, Rfl: 2    amLODIPine (NORVASC) 5 MG tablet, TAKE 1 TABLET BY MOUTH DAILY, Disp: 90 tablet, Rfl: 2    cloNIDine (CATAPRES) 0.2 MG tablet, TAKE 1 TABLET BY MOUTH TWICE DAILY, Disp: 180 tablet, Rfl: 1    oxaprozin (DAYPRO) 600 MG tablet, TAKE 1 TABLET BY MOUTH EVERY DAY, Disp: 60 tablet, Rfl: 5    valsartan-hydroCHLOROthiazide (DIOVAN-HCT) 320-25 MG per tablet, Take 1 tablet by mouth daily, Disp: 90 tablet, Rfl: 1    atorvastatin (LIPITOR) 20 MG tablet, TAKE ONE TABLET BY MOUTH DAILY, Disp: 90 tablet, Rfl: 3    albuterol sulfate HFA (PROVENTIL HFA) 108 (90 Base) MCG/ACT inhaler, Inhale 2 puffs into the lungs every 6 hours as needed for Wheezing, Disp: 1 Inhaler, Rfl: 5    estradiol (ESTRING) 2 MG vaginal ring, Place 2 mg vaginally, Disp: , Rfl:     tacrolimus (PROTOPIC) 0.1 % ointment, Apply topically to the face 2 times daily if needed for rosacea., Disp: 30 g, Rfl: 0    clobetasol (TEMOVATE) 0.05 % ointment, Apply to affected area on the left thigh twice daily for up to 2 weeks or until improved. , Disp: 45 g, Rfl: 2    mometasone (NASONEX) 50 MCG/ACT nasal spray, SHAKE WELL AND USE 2 SPRAYS NASALLY DAILY, Disp: 34 g, Rfl: 5    Allergies   Allergen Reactions    Codeine Itching    Penicillins Itching       Social History     Socioeconomic History    Marital status:      Spouse name: Not on file    Number of children: Not on file    Years of education: Not on file    Highest education level: Not on file   Occupational History    Not on file   Tobacco Use    Smoking status: Never Smoker    Smokeless tobacco: Never Used   Vaping Use    Vaping Use: Never used   Substance and Sexual Activity    Alcohol use: No    Drug use: No    Sexual activity: Yes     Partners: Male   Other Topics Concern    Not on file   Social History Narrative    Not on file     Social Determinants of Health     Financial Resource Strain: Low Risk     Difficulty of Paying Living Expenses: Not hard at all   Food Insecurity: No Food Insecurity    Worried About Running Out of Food in the Last Year: Never true    [address] in the Last Year: Never true   Transportation Needs:     Lack of Transportation (Medical): Not on file    Lack of Transportation (Non-Medical): Not on file   Physical Activity:     Days of Exercise per Week: Not on file    Minutes of Exercise per Session: Not on file   Stress:     Feeling of Stress : Not on file   Social Connections:     Frequency of Communication with Friends and Family: Not on file    Frequency of Social Gatherings with Friends and Family: Not on file    Attends Congregational Services: Not on file    Active Member of [address] [place] [address] or Organizations: Not on file    Attends Club or Organization Meetings: Not on file    Marital Status: Not on file   Intimate Partner Violence:     Fear of Current or Ex-Partner: Not on file    Emotionally Abused: Not on file    Physically Abused: Not on file    Sexually Abused: Not on file   Housing Stability:     Unable to Pay for Housing in the Last Year: Not on file    Number of [place] in the Last Year: Not on file    Unstable Housing in the Last Year: Not on file       Nursing Notes Reviewed      ROS:  General: no fever  ENT: no sinus congestion, no sore throat  RESP: no cough, no shortness of breath  Musculoskeletal: + arthralgia, no myalgia, no back pain,  + joint swelling  NEURO: no headache, no numbness, no weakness, no dizziness  DERM: no rash, no erythema, no ecchymosis, no wounds      PHYSICAL EXAM:  GENERAL APPEARANCE: Willie Sanchez is in no acute respiratory distress. Awake and alert.   VITAL SIGNS:   ED Triage Vitals [05/04/22 1453]   Enc Vitals Group      BP 118/76      Pulse 94      Resp 14      Temp 98.1 °F (36.7 °C)      Temp Source Oral      SpO2 99 %      Weight 249 lb 11.2 oz (113.3 kg)      Height 5' 3\" (1.6 m)      Head Circumference       Peak Flow       Pain Score       Pain Loc       Pain Edu? Excl. in [address]? HEAD: Normocephalic, atraumatic. EYES:  Extraocular muscles are intact. Conjunctivas are pink. Negative scleral icterus. ENT:  Mucous membranes are moist.    NECK: Nontender and supple. CHEST: Clear to auscultation bilaterally. No rales, rhonchi, or wheezing. HEART:  Regular rate and rhythm. No murmurs. Strong and equal pulses in the upper and lower extremities. .  MUSCULOSKELETAL:  Active range of motion of the upper and lower extremities. No edema. Left ankle with tenderness and swelling over lateral malleolus and 5th metatarsal and fibulotalar insertion point. No medial malleolar tenderness. No achilles tenderness or defect. Strong pedal pulses. No proximal fibular tenderness or calf tenderness  NEUROLOGICAL: Awake, alert and oriented x 3. Power intact in the upper and lower extremities. DERMATOLOGIC: No petechiae, rashes, or ecchymoses. ED COURSE AND MEDICAL DECISION MAKING:      Radiology:  All plain films have been evaluated by myself. They may have been overread by radiologist as noted in chart. Other radiologic studies (i.e. CT, MRI, ultrasounds, etc ) have been interpreted by radiologist.     XR FOOT LEFT (MIN 3 VIEWS)   Final Result   Left foot:   No acute osseous abnormality. Left ankle:   Soft tissue swelling with no acute osseous abnormality. XR ANKLE LEFT (MIN 3 VIEWS)   Final Result   Left foot:   No acute osseous abnormality. Left ankle:   Soft tissue swelling with no acute osseous abnormality. Labs:  No results found for this visit on 05/04/22.     Treatment in the department:  Patient received   Medications   ibuprofen (ADVIL;MOTRIN) tablet 800 mg (800 mg Oral Given 5/4/22 1719)     [employer] cast placed. .    Medical decision making:    I estimate there is LOW risk for FRACTURE, DISLOCATION, COMPARTMENT SYNDROME, DEEP VENOUS THROMBOSIS, SEPTIC ARTHRITIS, OSTEOMYELITIS, TENDON OR NEUROVASCULAR INJURY, thus I consider the discharge disposition reasonable. Bina Simpson and I have discussed the diagnosis and risks, and we agree with discharging home to follow-up with their primary doctor or the referral orthopedist. We also discussed returning to the Emergency Department immediately if new or worsening symptoms occur. Clinical Impression:  1. Moderate left ankle sprain, initial encounter        Dispo:  Patient will be discharged  at this time. Patient was informed of this decision and agrees with plan. I have discussed lab and xray findings with patient and they understand. Questions were answered to the best of my ability. Discharge vitals:  Blood pressure 118/76, pulse 94, temperature 98.1 °F (36.7 °C), temperature source Oral, resp. rate 14, height 5' 3\" (1.6 m), weight 249 lb 11.2 oz (113.3 kg), SpO2 99 %, not currently breastfeeding. Prescriptions given:   Discharge Medication List as of 5/4/2022  5:12 PM            This chart was created using dragon voice recognition software.         Lesli Feliz MD  05/05/22 0636

## 2023-03-15 ENCOUNTER — TELEPHONE (OUTPATIENT)
Dept: CARDIOLOGY | Facility: CLINIC | Age: 83
End: 2023-03-15

## 2023-03-15 NOTE — TELEPHONE ENCOUNTER
"  Caller: Darrick Andrade \"Jhoan\"    Relationship: Self    Best call back number: [phone]    What was the call regarding: HOSPITAL FU     Do you require a callback: YES    PT IS NEEDING A 2 WEEK HOSPITAL FU WITH  HOWEVER HIS FIRST AVAILABLE APPT IS NOT UNTIL 5.2.23.   "

## 2023-03-27 ENCOUNTER — OFFICE VISIT (OUTPATIENT)
Dept: CARDIOLOGY | Facility: CLINIC | Age: 83
End: 2023-03-27
Payer: MEDICARE

## 2023-03-27 ENCOUNTER — READMISSION MANAGEMENT (OUTPATIENT)
Dept: CALL CENTER | Facility: HOSPITAL | Age: 83
End: 2023-03-27
Payer: MEDICARE

## 2023-03-27 VITALS
DIASTOLIC BLOOD PRESSURE: 62 MMHG | BODY MASS INDEX: 29.4 KG/M2 | HEIGHT: 71 IN | WEIGHT: 210 LBS | HEART RATE: 84 BPM | SYSTOLIC BLOOD PRESSURE: 97 MMHG | OXYGEN SATURATION: 95 %

## 2023-03-27 DIAGNOSIS — I21.02 ST ELEVATION MYOCARDIAL INFARCTION INVOLVING LEFT ANTERIOR DESCENDING (LAD) CORONARY ARTERY: ICD-10-CM

## 2023-03-27 DIAGNOSIS — I25.10 CORONARY ARTERY DISEASE INVOLVING NATIVE CORONARY ARTERY OF NATIVE HEART WITHOUT ANGINA PECTORIS: Primary | Chronic | ICD-10-CM

## 2023-03-27 PROCEDURE — 1160F RVW MEDS BY RX/DR IN RCRD: CPT | Performed by: NURSE PRACTITIONER

## 2023-03-27 PROCEDURE — 1159F MED LIST DOCD IN RCRD: CPT | Performed by: NURSE PRACTITIONER

## 2023-03-27 PROCEDURE — 99214 OFFICE O/P EST MOD 30 MIN: CPT | Performed by: NURSE PRACTITIONER

## 2023-03-27 PROCEDURE — 3074F SYST BP LT 130 MM HG: CPT | Performed by: NURSE PRACTITIONER

## 2023-03-27 PROCEDURE — 3078F DIAST BP <80 MM HG: CPT | Performed by: NURSE PRACTITIONER

## 2023-03-27 RX ORDER — ISOSORBIDE MONONITRATE 30 MG/1
30 TABLET, EXTENDED RELEASE ORAL DAILY
Qty: 90 TABLET | Refills: 3 | COMMUNITY
Start: 2023-03-27

## 2023-03-27 RX ORDER — LOSARTAN POTASSIUM 50 MG/1
50 TABLET ORAL
Qty: 90 TABLET | Refills: 3 | COMMUNITY
Start: 2023-03-27

## 2023-03-27 NOTE — OUTREACH NOTE
AMI Week 3 Survey    Flowsheet Row Responses   [facility] patient discharged from? [facility]   Does the patient have one of the following disease processes/diagnoses(primary or secondary)? Acute MI (STEMI,NSTEMI)   Week 3 attempt successful? Yes   Call start time 1645   Call end time 1647   Discharge diagnosis STEMI   Is the patient taking all medications as directed (includes completed medication regime)? Yes   Does the patient have a primary care provider?  Yes   Has the patient kept scheduled appointments due by today? Yes   Psychosocial issues? No   What is the patient's perception of their health status since discharge? Improving   Is the patient/caregiver able to teach back ways to prevent a second heart attack: Take medications, Follow up with MD   If the patient is a current smoker, are they able to teach back resources for cessation? Not a smoker   Is the patient/caregiver able to teach back the hierarchy of who to call/visit for symptoms/problems? PCP, Specialist, Home health nurse, Urgent Care, ED, 911 Yes   Additional teach back comments States he is doing well.  Has followed up with his PCP and cardiologist.  Has been approved to start cardiac rehab on 4/13.     Week 3 call completed? Yes   Revoked No further contact(revokes)-requires comment   Graduated/Revoked comments Denies questions or needs at this time.          Martina JOE - Licensed Nurse

## 2023-03-27 NOTE — PROGRESS NOTES
[facility] CARDIOLOGY      REASON FOR FOLLOW-UP:  Follow-up hospitalization for STEMI, PCI          Chief Complaint   Patient presents with   • Heart Problem         Dear Emily Batres APRN        History of Present Illness   It was my pleasure to see Darrick Andrade in the office today.  He is an 83-year-old gentleman with known history of coronary artery disease per heart cath 2019, hypertension, dyslipidemia, esophageal stricture, peptic ulcer disease, chronic stable angina.      Mr. Andrade presented to [facility] 3/8/2023 via EMS with complaint of chest pain. He reported his pain is retrosternal, intermittent which began the day prior.  EKG performed by EMS showed lateral wall STEMI with inferior reciprocal changes.  By the time he arrived to the emergency department, his chest pain had resolved as well as the acute EKG changes.  He was taken emergently to the cardiac Cath Lab by Dr. 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  He was started on dual antiplatelet therapy consisting of aspirin and Brilinta.  He was admitted to the intensive care unit for close monitoring of any reperfusion arrhythmias.  TTE that admission showed normal LV systolic function with EF 61-65, grade 1 DD, mild aortic valve stenosis.  He was started on isosorbide mononitrate 30 mg daily, losartan 50 mg daily.  There were no adverse events reported and he was discharged home on 3/10/2023.  He presents today in follow-up for the above hospitalization.    Today, Mr. Andrade reports that he feels well.  He specifically denies any complaints of chest pain, pressure or tightness.  He reports no shortness of breath, dyspnea with exertion, dizziness, lightheadedness.  He denies any pain, swelling or bruising at groin cath site.   His blood pressure is a little soft today at 97/62.  He does check pressures at home and reports yesterday it was 147 systolic.  He had just taken his blood pressure medication prior to arrival here.  He denies any abnormal bleeding such as melena, hematochezia, hematuria or epistaxis.  He denies any questions about his procedure.  He is interested in participating in cardiac rehab.      ASSESSMENT:  ST elevation myocardial infarction March 2023 status post PCI  Known history of severe two-vessel coronary artery disease  Primary hypertension  Dyslipidemia  Known history of severe two-vessel coronary artery disease per prior heart cath  Antiplatelet therapy    PLAN:  Plain treadmill per rehab protocol for entrance to his phase 2 cardiac rehab-orders placed  Continue uninterrupted dual antiplatelet therapy consisting of Brilinta and aspirin  Continue losartan as prescribed, monitor pressures at home and call if systolic pressures following below 100 consistently or if dizzy/lightheaded  New medications changed to 90-day supply per patient request  Risk factor modification including heart healthy diet, routine exercise  Follow-up with primary cardiologist in 3 months or sooner if needed      Diagnoses and all orders for this visit:    1. Coronary artery disease involving native coronary artery of native heart without angina pectoris (Primary)  -     Treadmill Stress Test - Rehab Protocol; Future  -     Cardiac Rehab Phase II; Future    2. ST elevation myocardial infarction involving left anterior descending (LAD) coronary artery (HCC)  -     Treadmill Stress Test - Rehab Protocol; Future  -     Cardiac Rehab Phase II; Future          The following portions of the patient's history were reviewed and updated as appropriate: allergies, current medications, past family history, past medical history, past social history, past surgical history and problem list.    REVIEW OF SYSTEMS:    Review of Systems   All other systems  reviewed and are negative.      Vitals:    03/27/23 0957   BP: 97/62   Pulse: 84   SpO2: 95%         PHYSICAL EXAM:    General: Alert, cooperative, no distress, appears stated age  Head:  Normocephalic, atraumatic, mucous membranes moist  Eyes:  Conjunctiva/corneas clear, EOM's intact     Neck:  Supple,  no JVD or bruit     Lungs: Clear to auscultation bilaterally, no wheezes rhonchi rales are noted  Chest wall: No tenderness  Musculoskeletal:   Ambulates freely without assistance  Heart::  Regular rate and rhythm, S1 and S2 normal, no murmur, rub or gallop  Abdomen: Soft, non-tender, nondistended, bowel sounds active, no abdominal bruit  Extremities: No cyanosis, clubbing, or edema   Pulses: 2+ and symmetric all extremities  Skin:  No rashes or lesions  Neuro/psych: A&O x3. CN II through XII are grossly intact with appropriate affect        Past Medical History:   Diagnosis Date   • Basal cell carcinoma of skin 9/22/2014   • Coronary artery disease of native artery of native heart with stable angina pectoris (HCC) 7/15/2019   • COVID-19 vaccination declined    • COVID-19 virus detected 1/27/2022   • Essential hypertension 7/15/2019   • Gastrointestinal hemorrhage with melena 5/9/2020   • Glaucoma    • Hyperlipidemia LDL goal <70 7/15/2019   • Hypothyroidism        Past Surgical History:   Procedure Laterality Date   • CARDIAC CATHETERIZATION  04/26/2019    No stents placed   • CARDIAC CATHETERIZATION N/A 3/8/2023    Procedure: Left Heart Cath;  Surgeon: Eloisa Adrian MD;  Location: [facility] CATH INVASIVE LOCATION;  Service: Cardiology;  Laterality: N/A;   • CARDIAC CATHETERIZATION N/A 3/8/2023    Procedure: Stent AJIT coronary;  Surgeon: Eloisa Adrian MD;  Location: [facility] CATH INVASIVE LOCATION;  Service: Cardiology;  Laterality: N/A;   • CARDIAC CATHETERIZATION N/A 3/8/2023    Procedure: Percutaneous Coronary Intervention;  Surgeon: Eloisa Adrian MD;  Location: [facility] CATH INVASIVE LOCATION;  Service:  Cardiology;  Laterality: N/A;   • ENDOSCOPY N/A 5/10/2020    Procedure: ESOPHAGOGASTRODUODENOSCOPY  WITH BIOPSY X 1 AREA;  Surgeon: Chava Wood MD;  Location: [facility] ENDOSCOPY;  Service: Gastroenterology;  Laterality: N/A;  POST OP: DUODENAL ULCER, GASTRIC ULCER, HITAL HERNIA, ESOPHAGEAL STRICTURE   • HERNIA REPAIR     • INTERVENTIONAL RADIOLOGY PROCEDURE N/A 3/8/2023    Procedure: Intravascular Ultrasound;  Surgeon: Eloisa Adrian MD;  Location: [facility] CATH INVASIVE LOCATION;  Service: Cardiology;  Laterality: N/A;   • OTHER SURGICAL HISTORY      lump on back of head removed   • TOE SURGERY           Current Outpatient Medications:   •  aspirin 81 MG EC tablet, Take 1 tablet by mouth Daily., Disp: , Rfl:   •  isosorbide mononitrate (IMDUR) 30 MG 24 hr tablet, Take 1 tablet by mouth Daily., Disp: 90 tablet, Rfl: 3  •  levothyroxine (SYNTHROID, LEVOTHROID) 50 MCG tablet, Take 1 tablet by mouth Daily., Disp: , Rfl:   •  losartan (COZAAR) 50 MG tablet, Take 1 tablet by mouth Daily., Disp: 90 tablet, Rfl: 3  •  meclizine (ANTIVERT) 25 MG tablet, Take 12.5 mg by mouth 3 (Three) Times a Day As Needed., Disp: , Rfl:   •  metFORMIN ER (GLUCOPHAGE-XR) 500 MG 24 hr tablet, , Disp: , Rfl:   •  metoprolol tartrate (LOPRESSOR) 100 MG tablet, Take 50 mg by mouth 2 (Two) Times a Day., Disp: , Rfl:   •  pantoprazole (PROTONIX) 40 MG EC tablet, Take 1 tablet by mouth 2 (Two) Times a Day Before Meals., Disp: 60 tablet, Rfl: 2  •  ranolazine (RANEXA) 500 MG 12 hr tablet, TAKE ONE TABLET BY MOUTH TWICE A DAY, Disp: 60 tablet, Rfl: 4  •  rosuvastatin (CRESTOR) 20 MG tablet, Take 1 tablet by mouth Every Night., Disp: , Rfl:   •  ticagrelor (BRILINTA) 90 MG tablet tablet, Take 1 tablet by mouth 2 (Two) Times a Day., Disp: 180 tablet, Rfl: 3    No Known Allergies    Family History   Problem Relation Age of Onset   • Hyperlipidemia Mother    • Hyperlipidemia Father    • Brain cancer Father        Social History     Tobacco  "Use   • Smoking status: Former   • Smokeless tobacco: Never   Substance Use Topics   • Alcohol use: No           Current Electrocardiogram:  Procedures        EMR Dragon/Transcription:   \"Dictated utilizing Dragon dictation\".       "

## 2023-03-28 ENCOUNTER — TELEPHONE (OUTPATIENT)
Dept: CARDIAC REHAB | Facility: HOSPITAL | Age: 83
End: 2023-03-28
Payer: MEDICARE

## 2023-03-28 ENCOUNTER — DOCUMENTATION (OUTPATIENT)
Dept: CARDIAC REHAB | Facility: HOSPITAL | Age: 83
End: 2023-03-28
Payer: MEDICARE

## 2023-03-28 NOTE — TELEPHONE ENCOUNTER
Called to discuss Cardiac rehab.  Order was placed 3/27/23.  Pts voicemail box in full and unable to leave message.  Left message on daughters voicemail to have him to call.

## 2023-03-28 NOTE — PROGRESS NOTES
Changed referance location to [facility] for them to call patient due to [facility] address.

## 2023-04-04 ENCOUNTER — TELEPHONE (OUTPATIENT)
Dept: CARDIAC REHAB | Facility: HOSPITAL | Age: 83
End: 2023-04-04
Payer: MEDICARE

## 2023-04-04 NOTE — TELEPHONE ENCOUNTER
Discussed cardiac rehab insurance benefits with patient. Confirmed stress test appt and cardiac rehab initial session appt.

## 2023-04-10 ENCOUNTER — DOCUMENTATION (OUTPATIENT)
Dept: CARDIAC REHAB | Facility: HOSPITAL | Age: 83
End: 2023-04-10
Payer: MEDICARE

## 2023-04-10 NOTE — PROGRESS NOTES
Patient called and states has hurt his knee. Is requiring cane/crutches/wheelchair at times to get around.  Will f/u with MD on 5/9.  Will cancel TMT and Cardiac Rehab appointments for 4/13 and 4/18 due to injury.  Patient will call us when ready to reschedule.Our phone number given to patient.

## 2023-05-30 ENCOUNTER — HOSPITAL ENCOUNTER (OUTPATIENT)
Dept: CARDIOLOGY | Facility: HOSPITAL | Age: 83
Discharge: HOME | End: 2023-05-30

## 2023-05-30 DIAGNOSIS — I21.02 ST ELEVATION MYOCARDIAL INFARCTION INVOLVING LEFT ANTERIOR DESCENDING (LAD) CORONARY ARTERY: ICD-10-CM

## 2023-05-30 DIAGNOSIS — R00.1 BRADYCARDIA: Primary | ICD-10-CM

## 2023-05-30 DIAGNOSIS — I25.10 CORONARY ARTERY DISEASE INVOLVING NATIVE CORONARY ARTERY OF NATIVE HEART WITHOUT ANGINA PECTORIS: Chronic | ICD-10-CM

## 2023-05-30 LAB
MAXIMAL PREDICTED HEART RATE: 138 BPM
STRESS TARGET HR: 117 BPM

## 2023-05-30 PROCEDURE — 93017 CV STRESS TEST TRACING ONLY: CPT

## 2023-06-01 ENCOUNTER — DOCUMENTATION (OUTPATIENT)
Dept: CARDIAC REHAB | Facility: HOSPITAL | Age: 83
End: 2023-06-01
Payer: MEDICARE

## 2023-06-01 LAB
BH CV STRESS BP STAGE 1: NORMAL
BH CV STRESS DURATION MIN STAGE 1: 1
BH CV STRESS DURATION SEC STAGE 1: 5
BH CV STRESS GRADE STAGE 1: 10
BH CV STRESS HR STAGE 1: 75
BH CV STRESS METS STAGE 1: 4.6
BH CV STRESS PROTOCOL 1: NORMAL
BH CV STRESS RECOVERY BP: NORMAL MMHG
BH CV STRESS RECOVERY HR: 68 BPM
BH CV STRESS SPEED STAGE 1: 1.7
BH CV STRESS STAGE 1: 1
MAXIMAL PREDICTED HEART RATE: 138 BPM
PERCENT MAX PREDICTED HR: 69.57 %
STRESS BASELINE BP: NORMAL MMHG
STRESS BASELINE HR: 74 BPM
STRESS PERCENT HR: 82 %
STRESS POST ESTIMATED WORKLOAD: 4.6 METS
STRESS POST EXERCISE DUR MIN: 1 MIN
STRESS POST EXERCISE DUR SEC: 5 SEC
STRESS POST PEAK BP: NORMAL MMHG
STRESS POST PEAK HR: 96 BPM
STRESS TARGET HR: 117 BPM

## 2023-06-01 NOTE — PROGRESS NOTES
Per Dr Parisi on the TMT results--hold cardiac rehab until after 30 day Monitor results are read. Notified patient and cancelled cardiac rehab entrance evaluation.  Will reschedule pending outpatient monitor evaluation.

## 2023-07-04 LAB
MAXIMAL PREDICTED HEART RATE: 138 BPM
STRESS TARGET HR: 117 BPM

## 2023-07-24 ENCOUNTER — OFFICE VISIT (OUTPATIENT)
Dept: CARDIAC REHAB | Facility: HOSPITAL | Age: 83
End: 2023-07-24
Payer: MEDICARE

## 2023-07-24 DIAGNOSIS — Z95.5 STENTED CORONARY ARTERY: Primary | ICD-10-CM

## 2023-07-24 PROCEDURE — 93798 PHYS/QHP OP CAR RHAB W/ECG: CPT

## 2023-07-25 ENCOUNTER — TREATMENT (OUTPATIENT)
Dept: CARDIAC REHAB | Facility: HOSPITAL | Age: 83
End: 2023-07-25
Payer: MEDICARE

## 2023-07-25 DIAGNOSIS — Z95.5 STENTED CORONARY ARTERY: Primary | ICD-10-CM

## 2023-07-25 PROCEDURE — 93798 PHYS/QHP OP CAR RHAB W/ECG: CPT

## 2023-07-27 ENCOUNTER — TREATMENT (OUTPATIENT)
Dept: CARDIAC REHAB | Facility: HOSPITAL | Age: 83
End: 2023-07-27
Payer: MEDICARE

## 2023-07-27 DIAGNOSIS — Z95.5 STENTED CORONARY ARTERY: Primary | ICD-10-CM

## 2023-07-27 PROCEDURE — 93798 PHYS/QHP OP CAR RHAB W/ECG: CPT

## 2023-08-01 ENCOUNTER — TREATMENT (OUTPATIENT)
Dept: CARDIAC REHAB | Facility: HOSPITAL | Age: 83
End: 2023-08-01
Payer: MEDICARE

## 2023-08-01 DIAGNOSIS — Z95.5 STENTED CORONARY ARTERY: Primary | ICD-10-CM

## 2023-08-01 PROCEDURE — 93798 PHYS/QHP OP CAR RHAB W/ECG: CPT

## 2023-08-03 ENCOUNTER — TREATMENT (OUTPATIENT)
Dept: CARDIAC REHAB | Facility: HOSPITAL | Age: 83
End: 2023-08-03
Payer: MEDICARE

## 2023-08-03 DIAGNOSIS — Z95.5 STENTED CORONARY ARTERY: Primary | ICD-10-CM

## 2023-08-03 PROCEDURE — 93798 PHYS/QHP OP CAR RHAB W/ECG: CPT

## 2023-08-08 ENCOUNTER — TREATMENT (OUTPATIENT)
Dept: CARDIAC REHAB | Facility: HOSPITAL | Age: 83
End: 2023-08-08
Payer: MEDICARE

## 2023-08-08 DIAGNOSIS — Z95.5 STENTED CORONARY ARTERY: Primary | ICD-10-CM

## 2023-08-08 PROCEDURE — 93798 PHYS/QHP OP CAR RHAB W/ECG: CPT

## 2023-08-10 ENCOUNTER — TREATMENT (OUTPATIENT)
Dept: CARDIAC REHAB | Facility: HOSPITAL | Age: 83
End: 2023-08-10
Payer: MEDICARE

## 2023-08-10 DIAGNOSIS — Z95.5 STENTED CORONARY ARTERY: Primary | ICD-10-CM

## 2023-08-10 PROCEDURE — 93798 PHYS/QHP OP CAR RHAB W/ECG: CPT

## 2023-08-15 ENCOUNTER — TREATMENT (OUTPATIENT)
Dept: CARDIAC REHAB | Facility: HOSPITAL | Age: 83
End: 2023-08-15
Payer: MEDICARE

## 2023-08-15 DIAGNOSIS — Z95.5 STENTED CORONARY ARTERY: Primary | ICD-10-CM

## 2023-08-15 PROCEDURE — 93798 PHYS/QHP OP CAR RHAB W/ECG: CPT

## 2023-08-17 ENCOUNTER — TREATMENT (OUTPATIENT)
Dept: CARDIAC REHAB | Facility: HOSPITAL | Age: 83
End: 2023-08-17
Payer: MEDICARE

## 2023-08-17 DIAGNOSIS — Z95.5 STENTED CORONARY ARTERY: Primary | ICD-10-CM

## 2023-08-17 PROCEDURE — 93798 PHYS/QHP OP CAR RHAB W/ECG: CPT

## 2023-08-22 ENCOUNTER — TREATMENT (OUTPATIENT)
Dept: CARDIAC REHAB | Facility: HOSPITAL | Age: 83
End: 2023-08-22
Payer: MEDICARE

## 2023-08-22 DIAGNOSIS — Z95.5 STENTED CORONARY ARTERY: Primary | ICD-10-CM

## 2023-08-22 PROCEDURE — 93798 PHYS/QHP OP CAR RHAB W/ECG: CPT

## 2023-08-24 ENCOUNTER — TREATMENT (OUTPATIENT)
Dept: CARDIAC REHAB | Facility: HOSPITAL | Age: 83
End: 2023-08-24
Payer: MEDICARE

## 2023-08-24 DIAGNOSIS — Z95.5 STENTED CORONARY ARTERY: Primary | ICD-10-CM

## 2023-08-24 PROCEDURE — 93798 PHYS/QHP OP CAR RHAB W/ECG: CPT

## 2023-08-29 ENCOUNTER — TREATMENT (OUTPATIENT)
Dept: CARDIAC REHAB | Facility: HOSPITAL | Age: 83
End: 2023-08-29
Payer: MEDICARE

## 2023-08-29 DIAGNOSIS — Z95.5 STENTED CORONARY ARTERY: Primary | ICD-10-CM

## 2023-08-29 PROCEDURE — 93798 PHYS/QHP OP CAR RHAB W/ECG: CPT

## 2023-08-31 ENCOUNTER — TREATMENT (OUTPATIENT)
Dept: CARDIAC REHAB | Facility: HOSPITAL | Age: 83
End: 2023-08-31
Payer: MEDICARE

## 2023-08-31 DIAGNOSIS — Z95.5 STENTED CORONARY ARTERY: Primary | ICD-10-CM

## 2023-08-31 PROCEDURE — 93798 PHYS/QHP OP CAR RHAB W/ECG: CPT

## 2023-09-05 ENCOUNTER — TREATMENT (OUTPATIENT)
Dept: CARDIAC REHAB | Facility: HOSPITAL | Age: 83
End: 2023-09-05
Payer: MEDICARE

## 2023-09-05 DIAGNOSIS — Z95.5 STENTED CORONARY ARTERY: Primary | ICD-10-CM

## 2023-09-05 PROCEDURE — 93798 PHYS/QHP OP CAR RHAB W/ECG: CPT

## 2023-09-07 ENCOUNTER — TREATMENT (OUTPATIENT)
Dept: CARDIAC REHAB | Facility: HOSPITAL | Age: 83
End: 2023-09-07
Payer: MEDICARE

## 2023-09-07 DIAGNOSIS — Z95.5 STENTED CORONARY ARTERY: Primary | ICD-10-CM

## 2023-09-07 PROCEDURE — 93798 PHYS/QHP OP CAR RHAB W/ECG: CPT

## 2023-09-12 ENCOUNTER — TREATMENT (OUTPATIENT)
Dept: CARDIAC REHAB | Facility: HOSPITAL | Age: 83
End: 2023-09-12
Payer: MEDICARE

## 2023-09-12 DIAGNOSIS — Z95.5 STENTED CORONARY ARTERY: Primary | ICD-10-CM

## 2023-09-12 PROCEDURE — 93798 PHYS/QHP OP CAR RHAB W/ECG: CPT

## 2023-09-14 ENCOUNTER — TREATMENT (OUTPATIENT)
Dept: CARDIAC REHAB | Facility: HOSPITAL | Age: 83
End: 2023-09-14
Payer: MEDICARE

## 2023-09-14 DIAGNOSIS — Z95.5 STENTED CORONARY ARTERY: Primary | ICD-10-CM

## 2023-09-14 PROCEDURE — 93798 PHYS/QHP OP CAR RHAB W/ECG: CPT

## 2023-09-19 ENCOUNTER — APPOINTMENT (OUTPATIENT)
Dept: CARDIAC REHAB | Facility: HOSPITAL | Age: 83
End: 2023-09-19
Payer: MEDICARE

## 2023-09-21 ENCOUNTER — TREATMENT (OUTPATIENT)
Dept: CARDIAC REHAB | Facility: HOSPITAL | Age: 83
End: 2023-09-21
Payer: MEDICARE

## 2023-09-21 DIAGNOSIS — Z95.5 STENTED CORONARY ARTERY: Primary | ICD-10-CM

## 2023-09-21 PROCEDURE — 93798 PHYS/QHP OP CAR RHAB W/ECG: CPT

## 2023-09-26 ENCOUNTER — TREATMENT (OUTPATIENT)
Dept: CARDIAC REHAB | Facility: HOSPITAL | Age: 83
End: 2023-09-26
Payer: MEDICARE

## 2023-09-26 DIAGNOSIS — Z95.5 STENTED CORONARY ARTERY: Primary | ICD-10-CM

## 2023-09-26 PROCEDURE — 93798 PHYS/QHP OP CAR RHAB W/ECG: CPT

## 2023-09-28 ENCOUNTER — TREATMENT (OUTPATIENT)
Dept: CARDIAC REHAB | Facility: HOSPITAL | Age: 83
End: 2023-09-28
Payer: MEDICARE

## 2023-09-28 DIAGNOSIS — Z95.5 STENTED CORONARY ARTERY: Primary | ICD-10-CM

## 2023-09-28 PROCEDURE — 93798 PHYS/QHP OP CAR RHAB W/ECG: CPT

## 2023-10-03 ENCOUNTER — OFFICE VISIT (OUTPATIENT)
Dept: CARDIOLOGY | Facility: CLINIC | Age: 83
End: 2023-10-03
Payer: MEDICARE

## 2023-10-03 ENCOUNTER — TREATMENT (OUTPATIENT)
Dept: CARDIAC REHAB | Facility: HOSPITAL | Age: 83
End: 2023-10-03
Payer: MEDICARE

## 2023-10-03 VITALS
SYSTOLIC BLOOD PRESSURE: 104 MMHG | HEIGHT: 71 IN | DIASTOLIC BLOOD PRESSURE: 60 MMHG | HEART RATE: 64 BPM | WEIGHT: 201 LBS | BODY MASS INDEX: 28.14 KG/M2 | OXYGEN SATURATION: 97 %

## 2023-10-03 DIAGNOSIS — I10 ESSENTIAL HYPERTENSION: Chronic | ICD-10-CM

## 2023-10-03 DIAGNOSIS — I25.10 CORONARY ARTERY DISEASE INVOLVING NATIVE CORONARY ARTERY OF NATIVE HEART WITHOUT ANGINA PECTORIS: Chronic | ICD-10-CM

## 2023-10-03 DIAGNOSIS — E78.5 HYPERLIPIDEMIA LDL GOAL <70: Chronic | ICD-10-CM

## 2023-10-03 DIAGNOSIS — I21.02 ST ELEVATION MYOCARDIAL INFARCTION INVOLVING LEFT ANTERIOR DESCENDING (LAD) CORONARY ARTERY: ICD-10-CM

## 2023-10-03 DIAGNOSIS — I21.02 STEMI INVOLVING LEFT ANTERIOR DESCENDING CORONARY ARTERY: Primary | ICD-10-CM

## 2023-10-03 DIAGNOSIS — Z95.5 STENTED CORONARY ARTERY: Primary | ICD-10-CM

## 2023-10-03 PROCEDURE — 93798 PHYS/QHP OP CAR RHAB W/ECG: CPT

## 2023-10-03 PROCEDURE — 1160F RVW MEDS BY RX/DR IN RCRD: CPT | Performed by: INTERNAL MEDICINE

## 2023-10-03 PROCEDURE — 99214 OFFICE O/P EST MOD 30 MIN: CPT | Performed by: INTERNAL MEDICINE

## 2023-10-03 PROCEDURE — 3078F DIAST BP <80 MM HG: CPT | Performed by: INTERNAL MEDICINE

## 2023-10-03 PROCEDURE — 93000 ELECTROCARDIOGRAM COMPLETE: CPT | Performed by: INTERNAL MEDICINE

## 2023-10-03 PROCEDURE — 1159F MED LIST DOCD IN RCRD: CPT | Performed by: INTERNAL MEDICINE

## 2023-10-03 PROCEDURE — 3074F SYST BP LT 130 MM HG: CPT | Performed by: INTERNAL MEDICINE

## 2023-10-03 RX ORDER — RANOLAZINE 1000 MG/1
TABLET, EXTENDED RELEASE ORAL
COMMUNITY
Start: 2023-09-02

## 2023-10-03 NOTE — PROGRESS NOTES
Cardiology Office Visit      Encounter Date:  10/03/2023    Patient ID:   Darrick Andrade is a 83 y.o. male.    Reason For Followup:  Coronary artery disease  Hypertension  Hyperlipidemia      Brief Clinical History:  Dear Emily Etienne APRN    I had the pleasure of seeing Darrick Andrade today. As you are well aware, this is a 83 y.o. male with past medical history that is significant for history of  hypertension hyperlipidemia, currently on maximal medical therapy including aspirin statin beta-blocker and Imdur continued to have recurrent episodes of chest discomfort of the new suspicious for anginal equivalent.       Mr. Andrade presented to [facility] 3/8/2023 via EMS with complaint of chest pain. He reported his pain is retrosternal, intermittent which began the day prior.  EKG performed by EMS showed lateral wall STEMI with inferior reciprocal changes.  By the time he arrived to the emergency department, his chest pain had resolved as well as the acute EKG changes.  He was taken emergently to the cardiac Cath Lab by Dr. 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  He was started on dual antiplatelet therapy consisting of aspirin and Brilinta.  He was admitted to the intensive care unit for close monitoring of any reperfusion arrhythmias.  TTE that admission showed normal LV systolic function with EF 61-65, grade 1 DD, mild aortic valve stenosis.  He was started on isosorbide mononitrate 30 mg daily, losartan 50 mg daily.  There were no adverse events reported and he was discharged home on 3/10/2023       Interval History:  Patient denies any further symptoms of chest discomfort  Denies any symptoms of chest pain shortness of breath dizziness or syncope  No further episodes of dizziness or syncope  Compliant with  medical therapy  Status post PCI and stenting of the LAD diagonal bifurcation in March 2023  Assessment & Plan    Impressions:  Coronary artery disease  Stable angina  Hypertension  Hyperlipidemia  Nonobstructing distal esophageal stricture  Recent hospitalization with active COVID-19 infection and syncope with no recurrence of symptoms  Status post PCI and stenting of the LAD diagonal bifurcation in March 2023  Myocardial infarction in March status post PCI and stenting of the LAD diagonal bifurcation    Recommendations:  Continue aspirin 81 mg p.o. once a day  Brilinta 90 mg p.o. twice daily  Continue current medical therapy with Crestor 20 mg p.o. once a day Ranexa 500 mg p.o. twice daily metoprolol 12.5 mg p.o. twice daily losartan 50 mg p.o. once a day  Discontinue isosorbide with an intention patient wants to try medication for erectile dysfunction  Risk benefits and alternatives reviewed and discussed with patient  If patient has no further chest pain without isosorbide and continue to do well with cardiac rehab will consider prescription for erectile dysfunction medication  Stable from cardiac standpoint  Continue current medical therapy with aspirin 81 mg p.o. once a day Ranexa 500 mg p.o. twice daily metoprolol 100 mg p.o. 2 times a day losartan 25 mg p.o. once a day  Labs with primary care physician office  Continue cardiac rehab  Follow-up in office in 6 months            Lab Results   Component Value Date    GLUCOSE 113 (H) 03/10/2023    BUN 18 03/10/2023    CREATININE 1.12 03/10/2023    EGFR 65.6 03/10/2023    BCR 16.1 03/10/2023    K 3.7 03/10/2023    CO2 23.0 03/10/2023    CALCIUM 8.9 03/10/2023    ALBUMIN 3.8 03/10/2023    BILITOT 0.9 03/10/2023    AST 44 (H) 03/10/2023    ALT 18 03/10/2023     Results for orders placed during the hospital encounter of 03/07/23    Adult Transthoracic Echo Complete W/ Cont if Necessary Per Protocol    Interpretation Summary    Left ventricular systolic function is  normal. Left ventricular ejection fraction appears to be 61 - 65%.    Left ventricular wall thickness is consistent with mild concentric hypertrophy.    Left ventricular diastolic function is consistent with (grade I) impaired relaxation.    Mild aortic valve stenosis is present.    Aortic valve maximum pressure gradient is 32 mmHg. Aortic valve mean pressure gradient is 16 mmHg.    There is mild, bileaflet mitral valve thickening present.    Estimated right ventricular systolic pressure from tricuspid regurgitation is normal (<35 mmHg).    The study is technically difficult for diagnosis.     Results for orders placed during the hospital encounter of 03/07/23    Cardiac Catheterization/Vascular Study    Narrative  OPERATORS  Eloisa Adrian M.D. (Attending Cardiologist)      PROCEDURES PERFORMED  Ultrasound guided Vascular access  Left Heart Catheterization  Coronary Angiogram  Moderate sedation  Primary PCI of the D2  IVUS of D2  PCI of mid LAD  IVUS of LAD    INDICATIONS FOR PROCEDURE  Lateral STEMI    PROCEDURE IN DETAIL  Informed consent was obtained from the patient after explaining the risks, benefits, and alternative options of the procedure. After obtaining informed consent, the patient was brought to the cath lab and was prepped in a sterile fashion. Lidocaine 2% was used for local anesthesia into the right femoral access site. The right femoral artery was accessed with a micropuncture needle via modified Seldinger technique under ultrasound guidance. A 6F sheath was inserted successfully. Afterwards, 6F JR4 and JL4 diagnostic catheters were advanced over a wire into the ascending aorta and were used to engage the ostia of the left main and RCA respectively. JR4 used to cross the AV and obtain LV pressures and gradient across the AV measured via pullback technique. Images of the right and left coronary systems were obtained.      INTERVENTIONAL REPORT  Heparin was given to maintain an ACT more than 300.  6  Latvian XB LAD 3.5 guide was used to engage the left main.  Run-through wire was advanced to the distal D2.  This was predilated with trek 2.0 x 12 mm balloon.  IVUS of the diagonal showed plaque rupture with reference vessel diameter of 2.75 mm.  A run-through wire was advanced to the distal LAD.  The LAD was then predilated with a trek 2.0 x 12 mm balloon.  Next the diagonal was stented with a Xience 2.5 x 23 mm balloon.  This was postdilated with the stent balloon and then a previously positioned NC trek 2.5 x 12 mm balloon in the LAD was used to position and crush the diagonal stent.  I was unable to recross the diagonal stent with multiple balloons including a mini trek 1.25 x 12 mm balloon.  Next the LAD was further predilated with a mini trek 2.0 x 12 mm balloon followed by NC 2.5 x 12 mm balloon.  IVUS of the LAD showed reference vessel diameter of 3.75 mm.  The LAD was then stented with a Xience 3.0 x 12 mm stent followed by a Xience 3.0 X 18 mm stent.  IVUS post stent deployment showed underexpansion in the mid segment.  This was then postdilated with NC Euphora 3.5 x 12 mm balloon to max pressure of 16 rosana.  KVNG-3 flow in the diagonal and LAD noted post PCI.  No residual stenosis.  No complications.    All the catheters were exchanged over a wire and subsequently removed. Angiogram of the femoral access site was obtained and did not show complications. The patient tolerated the procedure well without any complications. The pictures were reviewed at the end of the procedure. An Angio-Seal closure device was applied.    HEMODYNAMICS    LV: 163/3/15  AO: 153/86/117  Gradient 10 mmHg    FINDINGS  Coronary Angiogram    Right dominant circulation    Left main: Left main is a large caliber vessel which gives rise to the Left Anterior Descending and the Left circumflex. No angiographically significant stenosis    Left Anterior Descending Artery: LAD is a large caliber vessel which gives rise to several septal  perforators and several diagonal branches.  There is 70% stenosis in the mid LAD.  There is hazy lesion in the proximal D2 with 90% stenosis.  This is the culprit vessel.  KVNG-3 flow.    Left Circumflex: Lcx is a large caliber vessel which gives rise to several OM branches.  There are luminal irregularities    Right Coronary Artery: The RCA is a large caliber vessel gives rise to PDA and PLV.  There is 30% stenosis in the mid RCA.    LESION INFORMATION  Culprit lesion  Diagonal 2  90% hazy lesion  Type C  KVNG-3 flow pre and post PCI  No residual stenosis  LAD  70% stenosis  Type C  KVNG-3 flow pre and post PCI  No residual stenosis    ESTIMATED BLOOD LOSS:  10 ml    COMPLICATIONS:  None    PROCEDURE DATA:  Contrast Used:  250cc  Sedation Time:  90 minutes    IMPRESSIONS  Two-vessel CAD involving the mid LAD and D2  Plaque rupture of the D2.  This is the culprit vessel  Normal left heart filling pressures  Mild gradient across aortic valve    RECOMMENDATIONS  Continue Integrilin until the bag runs out  Continue with aspirin and Brilinta uninterrupted for 12 months  Statin and beta-blocker  Check lipid panel  Admit to ICU  Check echocardiogram  Cardiac rehab at discharge     Lab Results   Component Value Date    CHOL 160 03/08/2023    TRIG 168 (H) 03/08/2023    HDL 38 (L) 03/08/2023    LDL 93 03/08/2023      Results for orders placed during the hospital encounter of 05/30/23    Treadmill Stress Test - Rehab Protocol    Interpretation Summary    Patient exercised only 1 minute on Mauri protocol    Patient at the end of the procedure developed significant diaphoresis with bradycardia possible high degree AV block with significant artifact on EKG    Consider holding off on the cardiac rehab until patient is seen in the office again after the extended Holter monitor    Plans to reduce the dose of beta-blocker and arrange for extended Holter monitor and close monitoring in the office   Results for orders placed in visit  "on 05/30/23    Mobile Cardiac Outpatient Telemetry    Interpretation Summary  Extended Holter monitor study  Patient was monitored from May 30, 2023 to June 28, 2023  Underlying rhythm is sinus rhythm with a minimal heart rate of 50 bpm maximal heart rate of 120 bpm average heart rate of 82 bpm  No atrial fibrillation  No sustained ventricular or supraventricular tachyarrhythmia  No clinically significant pauses  First-degree AV block  Patient reported multiple symptomatic episodes with symptoms of lightheadedness and shortness of breath during that time patient noted to be in sinus rhythm with ventricular rate varying between 74bpm to 102 bpm with no significant cardiac arrhythmia  Relatively benign study  Clinical correlation is recommended           Objective:    Vitals:  Vitals:    10/03/23 1346   BP: 104/60   BP Location: Left arm   Patient Position: Sitting   Cuff Size: Adult   Pulse: 64   SpO2: 97%   Weight: 91.2 kg (201 lb)   Height: 180.3 cm (71\")       Physical Exam:    General: Alert, cooperative, no distress, appears stated age  Head:  Normocephalic, atraumatic, mucous membranes moist  Eyes:  Conjunctiva/corneas clear, EOM's intact     Neck:  Supple,  no adenopathy;      Lungs: Clear to auscultation bilaterally, no wheezes rhonchi rales are noted  Chest wall: No tenderness  Heart::  Regular rate and rhythm, S1 and S2 normal, no murmur, rub or gallop  Abdomen: Soft, non-tender, nondistended bowel sounds active  Extremities: No cyanosis, clubbing, or edema  Pulses: 2+ and symmetric all extremities  Skin:  No rashes or lesions  Neuro/psych: A&O x3. CN II through XII are grossly intact with appropriate affect      Allergies:  No Known Allergies    Medication Review:     Current Outpatient Medications:     albuterol sulfate  (90 Base) MCG/ACT inhaler, , Disp: , Rfl:     aspirin 81 MG EC tablet, Take 1 tablet by mouth Daily., Disp: , Rfl:     levothyroxine (SYNTHROID, LEVOTHROID) 50 MCG tablet, Take 1 " tablet by mouth Daily., Disp: , Rfl:     meclizine (ANTIVERT) 25 MG tablet, Take 0.5 tablets by mouth 3 (Three) Times a Day As Needed., Disp: , Rfl:     metFORMIN ER (GLUCOPHAGE-XR) 500 MG 24 hr tablet, 1 tablet Daily With Breakfast., Disp: , Rfl:     metoprolol tartrate (LOPRESSOR) 25 MG tablet, Take 1 tablet by mouth 2 (Two) Times a Day., Disp: 60 tablet, Rfl: 11    nitroglycerin (NITROSTAT) 0.4 MG SL tablet, , Disp: , Rfl:     ranolazine (RANEXA) 1000 MG 12 hr tablet, , Disp: , Rfl:     rosuvastatin (CRESTOR) 20 MG tablet, Take 1 tablet by mouth Every Night., Disp: , Rfl:     ticagrelor (BRILINTA) 90 MG tablet tablet, Take 1 tablet by mouth 2 (Two) Times a Day., Disp: 180 tablet, Rfl: 3    losartan (COZAAR) 50 MG tablet, Take 1 tablet by mouth Daily., Disp: 30 tablet, Rfl: 1    Family History:  Family History   Problem Relation Age of Onset    Hyperlipidemia Mother     Hyperlipidemia Father     Brain cancer Father        Past Medical History:  Past Medical History:   Diagnosis Date    Basal cell carcinoma of skin 9/22/2014    Coronary artery disease of native artery of native heart with stable angina pectoris 7/15/2019    COVID-19 vaccination declined     COVID-19 virus detected 1/27/2022    Essential hypertension 7/15/2019    Gastrointestinal hemorrhage with melena 5/9/2020    Glaucoma     Hyperlipidemia LDL goal <70 7/15/2019    Hypothyroidism        Past surgical History:  Past Surgical History:   Procedure Laterality Date    CARDIAC CATHETERIZATION  04/26/2019    No stents placed    CARDIAC CATHETERIZATION N/A 3/8/2023    Procedure: Left Heart Cath;  Surgeon: Eloisa Adrian MD;  Location: [facility] CATH INVASIVE LOCATION;  Service: Cardiology;  Laterality: N/A;    CARDIAC CATHETERIZATION N/A 3/8/2023    Procedure: Stent AJIT coronary;  Surgeon: Eloisa Adrian MD;  Location: [facility] CATH INVASIVE LOCATION;  Service: Cardiology;  Laterality: N/A;    CARDIAC CATHETERIZATION N/A 3/8/2023    Procedure: Percutaneous  Coronary Intervention;  Surgeon: Eloisa Adrian MD;  Location: [facility] CATH INVASIVE LOCATION;  Service: Cardiology;  Laterality: N/A;    ENDOSCOPY N/A 5/10/2020    Procedure: ESOPHAGOGASTRODUODENOSCOPY  WITH BIOPSY X 1 AREA;  Surgeon: Chava Wood MD;  Location: [facility] ENDOSCOPY;  Service: Gastroenterology;  Laterality: N/A;  POST OP: DUODENAL ULCER, GASTRIC ULCER, HITAL HERNIA, ESOPHAGEAL STRICTURE    HERNIA REPAIR      INTERVENTIONAL RADIOLOGY PROCEDURE N/A 3/8/2023    Procedure: Intravascular Ultrasound;  Surgeon: Eloisa Adrian MD;  Location: [facility] CATH INVASIVE LOCATION;  Service: Cardiology;  Laterality: N/A;    OTHER SURGICAL HISTORY      lump on back of head removed    TOE SURGERY         Social History:  Social History     Socioeconomic History    Marital status:    Tobacco Use    Smoking status: Former    Smokeless tobacco: Never   Vaping Use    Vaping Use: Never used   Substance and Sexual Activity    Alcohol use: No    Drug use: No    Sexual activity: Defer       Review of Systems:  The following systems were reviewed as they relate to the cardiovascular system: Constitutional, Eyes, ENT, Cardiovascular, Respiratory, Gastrointestinal, Integumentary, Neurological, Psychiatric, Hematologic, Endocrine, Musculoskeletal, and Genitourinary. The pertinent cardiovascular findings are reported above with all other cardiovascular points within those systems being negative.    Diagnostic Study Review:     Current Electrocardiogram:    ECG 12 Lead    Date/Time: 10/3/2023 2:00 PM  Performed by: Hien Parisi MD  Authorized by: Hien Parisi MD   Comparison: compared with previous ECG   Similar to previous ECG  Rhythm: sinus rhythm  Rate: normal  BPM: 83  Conduction: conduction normal  QRS axis: normal  Other findings: non-specific ST-T wave changes    Clinical impression: abnormal EKG          Advance Care Planning   ACP discussion was held with the patient during this  visit. Patient has an advance directive in EMR which is still valid.         NOTE: The following portions of the patient's history were reviewed and updated this visit as appropriate: allergies, current medications, past family history, past medical history, past social history, past surgical history and problem list.

## 2023-10-05 ENCOUNTER — TREATMENT (OUTPATIENT)
Dept: CARDIAC REHAB | Facility: HOSPITAL | Age: 83
End: 2023-10-05
Payer: MEDICARE

## 2023-10-05 DIAGNOSIS — Z95.5 STENTED CORONARY ARTERY: Primary | ICD-10-CM

## 2023-10-05 PROCEDURE — 93798 PHYS/QHP OP CAR RHAB W/ECG: CPT

## 2023-10-10 ENCOUNTER — TREATMENT (OUTPATIENT)
Dept: CARDIAC REHAB | Facility: HOSPITAL | Age: 83
End: 2023-10-10
Payer: MEDICARE

## 2023-10-10 DIAGNOSIS — Z95.5 STENTED CORONARY ARTERY: Primary | ICD-10-CM

## 2023-10-10 PROCEDURE — 93798 PHYS/QHP OP CAR RHAB W/ECG: CPT

## 2023-10-12 ENCOUNTER — APPOINTMENT (OUTPATIENT)
Dept: CARDIAC REHAB | Facility: HOSPITAL | Age: 83
End: 2023-10-12
Payer: MEDICARE

## 2023-10-17 ENCOUNTER — TREATMENT (OUTPATIENT)
Dept: CARDIAC REHAB | Facility: HOSPITAL | Age: 83
End: 2023-10-17
Payer: MEDICARE

## 2023-10-17 DIAGNOSIS — Z95.5 STENTED CORONARY ARTERY: Primary | ICD-10-CM

## 2023-10-17 PROCEDURE — 93798 PHYS/QHP OP CAR RHAB W/ECG: CPT

## 2023-10-19 ENCOUNTER — TREATMENT (OUTPATIENT)
Dept: CARDIAC REHAB | Facility: HOSPITAL | Age: 83
End: 2023-10-19
Payer: MEDICARE

## 2023-10-19 DIAGNOSIS — Z95.5 STENTED CORONARY ARTERY: Primary | ICD-10-CM

## 2023-10-19 PROCEDURE — 93798 PHYS/QHP OP CAR RHAB W/ECG: CPT

## 2023-10-24 ENCOUNTER — TREATMENT (OUTPATIENT)
Dept: CARDIAC REHAB | Facility: HOSPITAL | Age: 83
End: 2023-10-24
Payer: MEDICARE

## 2023-10-24 DIAGNOSIS — Z95.5 STENTED CORONARY ARTERY: Primary | ICD-10-CM

## 2023-10-24 PROCEDURE — 93798 PHYS/QHP OP CAR RHAB W/ECG: CPT

## 2023-10-26 ENCOUNTER — TREATMENT (OUTPATIENT)
Dept: CARDIAC REHAB | Facility: HOSPITAL | Age: 83
End: 2023-10-26
Payer: MEDICARE

## 2023-10-26 DIAGNOSIS — Z95.5 STENTED CORONARY ARTERY: Primary | ICD-10-CM

## 2023-10-26 PROCEDURE — 93798 PHYS/QHP OP CAR RHAB W/ECG: CPT

## 2023-10-31 ENCOUNTER — TREATMENT (OUTPATIENT)
Dept: CARDIAC REHAB | Facility: HOSPITAL | Age: 83
End: 2023-10-31
Payer: MEDICARE

## 2023-10-31 DIAGNOSIS — Z95.5 STENTED CORONARY ARTERY: Primary | ICD-10-CM

## 2023-10-31 PROCEDURE — 93798 PHYS/QHP OP CAR RHAB W/ECG: CPT

## 2023-11-02 ENCOUNTER — TREATMENT (OUTPATIENT)
Dept: CARDIAC REHAB | Facility: HOSPITAL | Age: 83
End: 2023-11-02
Payer: MEDICARE

## 2023-11-02 DIAGNOSIS — Z95.5 STENTED CORONARY ARTERY: Primary | ICD-10-CM

## 2023-11-02 PROCEDURE — 93798 PHYS/QHP OP CAR RHAB W/ECG: CPT

## 2023-11-07 ENCOUNTER — TREATMENT (OUTPATIENT)
Dept: CARDIAC REHAB | Facility: HOSPITAL | Age: 83
End: 2023-11-07
Payer: MEDICARE

## 2023-11-07 DIAGNOSIS — Z95.5 STENTED CORONARY ARTERY: Primary | ICD-10-CM

## 2023-11-07 PROCEDURE — 93798 PHYS/QHP OP CAR RHAB W/ECG: CPT

## 2023-11-09 ENCOUNTER — TREATMENT (OUTPATIENT)
Dept: CARDIAC REHAB | Facility: HOSPITAL | Age: 83
End: 2023-11-09
Payer: MEDICARE

## 2023-11-09 DIAGNOSIS — Z95.5 STENTED CORONARY ARTERY: Primary | ICD-10-CM

## 2023-11-09 PROCEDURE — 93798 PHYS/QHP OP CAR RHAB W/ECG: CPT

## 2023-11-14 ENCOUNTER — TREATMENT (OUTPATIENT)
Dept: CARDIAC REHAB | Facility: HOSPITAL | Age: 83
End: 2023-11-14
Payer: MEDICARE

## 2023-11-14 DIAGNOSIS — Z95.5 STENTED CORONARY ARTERY: Primary | ICD-10-CM

## 2023-11-14 PROCEDURE — 93798 PHYS/QHP OP CAR RHAB W/ECG: CPT

## 2023-11-16 ENCOUNTER — TREATMENT (OUTPATIENT)
Dept: CARDIAC REHAB | Facility: HOSPITAL | Age: 83
End: 2023-11-16
Payer: MEDICARE

## 2023-11-16 DIAGNOSIS — Z95.5 STENTED CORONARY ARTERY: Primary | ICD-10-CM

## 2023-11-16 PROCEDURE — 93798 PHYS/QHP OP CAR RHAB W/ECG: CPT

## 2023-11-20 ENCOUNTER — TREATMENT (OUTPATIENT)
Dept: CARDIAC REHAB | Facility: HOSPITAL | Age: 83
End: 2023-11-20
Payer: MEDICARE

## 2023-11-20 DIAGNOSIS — Z95.5 STENTED CORONARY ARTERY: Primary | ICD-10-CM

## 2023-11-20 PROCEDURE — 93798 PHYS/QHP OP CAR RHAB W/ECG: CPT

## 2023-11-21 ENCOUNTER — APPOINTMENT (OUTPATIENT)
Dept: CARDIAC REHAB | Facility: HOSPITAL | Age: 83
End: 2023-11-21
Payer: MEDICARE

## 2023-11-23 ENCOUNTER — APPOINTMENT (OUTPATIENT)
Dept: CARDIAC REHAB | Facility: HOSPITAL | Age: 83
End: 2023-11-23
Payer: MEDICARE

## 2023-11-28 ENCOUNTER — TREATMENT (OUTPATIENT)
Dept: CARDIAC REHAB | Facility: HOSPITAL | Age: 83
End: 2023-11-28
Payer: MEDICARE

## 2023-11-28 DIAGNOSIS — Z95.5 STENTED CORONARY ARTERY: Primary | ICD-10-CM

## 2023-11-28 PROCEDURE — 93798 PHYS/QHP OP CAR RHAB W/ECG: CPT

## 2023-11-30 ENCOUNTER — TREATMENT (OUTPATIENT)
Dept: CARDIAC REHAB | Facility: HOSPITAL | Age: 83
End: 2023-11-30
Payer: MEDICARE

## 2023-11-30 DIAGNOSIS — Z95.5 STENTED CORONARY ARTERY: Primary | ICD-10-CM

## 2023-11-30 PROCEDURE — 93798 PHYS/QHP OP CAR RHAB W/ECG: CPT

## 2023-12-26 ENCOUNTER — TELEPHONE (OUTPATIENT)
Dept: CARDIOLOGY | Facility: CLINIC | Age: 83
End: 2023-12-26

## 2023-12-26 NOTE — TELEPHONE ENCOUNTER
Caller: GILMAR    Relationship: SELF    Best call back number: [phone]    What is the best time to reach you: ANY        What was the call regarding: RETURNING A PHONE CALL FOR DALLAS TO GIVE HER THIS NUMBER DUE TO POTENTIAL ISSUES WITH HIS PHONE.

## 2024-04-18 ENCOUNTER — OFFICE VISIT (OUTPATIENT)
Dept: CARDIOLOGY | Facility: CLINIC | Age: 84
End: 2024-04-18
Payer: MEDICARE

## 2024-04-18 VITALS
DIASTOLIC BLOOD PRESSURE: 68 MMHG | SYSTOLIC BLOOD PRESSURE: 107 MMHG | BODY MASS INDEX: 27.86 KG/M2 | HEART RATE: 74 BPM | HEIGHT: 71 IN | WEIGHT: 199 LBS | OXYGEN SATURATION: 98 %

## 2024-04-18 DIAGNOSIS — E78.5 HYPERLIPIDEMIA LDL GOAL <70: Chronic | ICD-10-CM

## 2024-04-18 DIAGNOSIS — I21.02 ST ELEVATION MYOCARDIAL INFARCTION INVOLVING LEFT ANTERIOR DESCENDING (LAD) CORONARY ARTERY: ICD-10-CM

## 2024-04-18 DIAGNOSIS — I21.02 STEMI INVOLVING LEFT ANTERIOR DESCENDING CORONARY ARTERY: ICD-10-CM

## 2024-04-18 DIAGNOSIS — I10 ESSENTIAL HYPERTENSION: Chronic | ICD-10-CM

## 2024-04-18 DIAGNOSIS — I25.10 CORONARY ARTERY DISEASE INVOLVING NATIVE CORONARY ARTERY OF NATIVE HEART WITHOUT ANGINA PECTORIS: Primary | Chronic | ICD-10-CM

## 2024-04-18 PROCEDURE — 3074F SYST BP LT 130 MM HG: CPT | Performed by: INTERNAL MEDICINE

## 2024-04-18 PROCEDURE — 1159F MED LIST DOCD IN RCRD: CPT | Performed by: INTERNAL MEDICINE

## 2024-04-18 PROCEDURE — 1160F RVW MEDS BY RX/DR IN RCRD: CPT | Performed by: INTERNAL MEDICINE

## 2024-04-18 PROCEDURE — 93000 ELECTROCARDIOGRAM COMPLETE: CPT | Performed by: INTERNAL MEDICINE

## 2024-04-18 PROCEDURE — 99214 OFFICE O/P EST MOD 30 MIN: CPT | Performed by: INTERNAL MEDICINE

## 2024-04-18 PROCEDURE — 3078F DIAST BP <80 MM HG: CPT | Performed by: INTERNAL MEDICINE

## 2024-04-18 NOTE — PROGRESS NOTES
Cardiology Office Visit      Encounter Date:  04/18/2024    Patient ID:   Darrick Andrade is a 84 y.o. male.  Reason For Followup:  Coronary artery disease  Hypertension  Hyperlipidemia      Brief Clinical History:  Dear Emily Etienne APRN    I had the pleasure of seeing Darrick Andrade today. As you are well aware, this is a 84 y.o. male with past medical history that is significant for history of  hypertension hyperlipidemia, currently on maximal medical therapy including aspirin statin beta-blocker and Imdur continued to have recurrent episodes of chest discomfort of the new suspicious for anginal equivalent.       Mr. Andrade presented to [facility] 3/8/2023 via EMS with complaint of chest pain. He reported his pain is retrosternal, intermittent which began the day prior.  EKG performed by EMS showed lateral wall STEMI with inferior reciprocal changes.  By the time he arrived to the emergency department, his chest pain had resolved as well as the acute EKG changes.  He was taken emergently to the cardiac Cath Lab by Dr. 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  He was started on dual antiplatelet therapy consisting of aspirin and Brilinta.  He was admitted to the intensive care unit for close monitoring of any reperfusion arrhythmias.  TTE that admission showed normal LV systolic function with EF 61-65, grade 1 DD, mild aortic valve stenosis.  He was started on isosorbide mononitrate 30 mg daily, losartan 50 mg daily.  There were no adverse events reported and he was discharged home on 3/10/2023       Interval History:  Patient denies any further symptoms of chest discomfort  Denies any symptoms of chest pain shortness of breath dizziness or syncope  No further episodes of dizziness or syncope  Compliant with  "medical therapy  Status post PCI and stenting of the LAD diagonal bifurcation in March 2023  Assessment & Plan    Impressions:  Coronary artery disease  Stable angina  Hypertension  Hyperlipidemia  Nonobstructing distal esophageal stricture  Recent hospitalization with active COVID-19 infection and syncope with no recurrence of symptoms  Status post PCI and stenting of the LAD diagonal bifurcation in March 2023  Myocardial infarction in March status post PCI and stenting of the LAD diagonal bifurcation    Recommendations:  Continue aspirin 81 mg p.o. once a day  Brilinta 60 mg p.o. twice daily  Continue current medical therapy with Crestor 20 mg p.o. once a day Ranexa 500 mg p.o. twice daily metoprolol 12.5 mg p.o. twice daily losartan 50 mg p.o. once a day  Discontinue isosorbide with an intention patient wants to try medication for erectile dysfunction  Risk benefits and alternatives reviewed and discussed with patient  If patient has no further chest pain without isosorbide and continue to do well with cardiac rehab will consider prescription for erectile dysfunction medication  Stable from cardiac standpoint  Continue current medical therapy with aspirin 81 mg p.o. once a day Ranexa 500 mg p.o. twice daily metoprolol 100 mg p.o. 2 times a day losartan 25 mg p.o. once a day  Decrease the dose of Brilinta to 60 mg p.o. twice daily  Labs with primary care physician office  Labs workup and recent medical records reviewed and discussed with patient  Follow-up in office in 6 months        Vitals:  Vitals:    04/18/24 1511   BP: 107/68   Pulse: 74   SpO2: 98%   Weight: 90.3 kg (199 lb)   Height: 180.3 cm (71\")       Physical Exam:    General: Alert, cooperative, no distress, appears stated age  Head:  Normocephalic, atraumatic, mucous membranes moist  Eyes:  Conjunctiva/corneas clear, EOM's intact     Neck:  Supple,  no adenopathy;      Lungs: Clear to auscultation bilaterally, no wheezes rhonchi rales are noted  Chest " wall: No tenderness  Heart::  Regular rate and rhythm, S1 and S2 normal, no murmur, rub or gallop  Abdomen: Soft, non-tender, nondistended bowel sounds active  Extremities: No cyanosis, clubbing, or edema  Pulses: 2+ and symmetric all extremities  Skin:  No rashes or lesions  Neuro/psych: A&O x3. CN II through XII are grossly intact with appropriate affect              Lab Results   Component Value Date    GLUCOSE 113 (H) 03/10/2023    BUN 18 03/10/2023    CREATININE 1.12 03/10/2023    EGFR 65.6 03/10/2023    BCR 16.1 03/10/2023    K 3.7 03/10/2023    CO2 23.0 03/10/2023    CALCIUM 8.9 03/10/2023    ALBUMIN 3.8 03/10/2023    BILITOT 0.9 03/10/2023    AST 44 (H) 03/10/2023    ALT 18 03/10/2023     Results for orders placed during the hospital encounter of 03/07/23    Adult Transthoracic Echo Complete W/ Cont if Necessary Per Protocol    Interpretation Summary    Left ventricular systolic function is normal. Left ventricular ejection fraction appears to be 61 - 65%.    Left ventricular wall thickness is consistent with mild concentric hypertrophy.    Left ventricular diastolic function is consistent with (grade I) impaired relaxation.    Mild aortic valve stenosis is present.    Aortic valve maximum pressure gradient is 32 mmHg. Aortic valve mean pressure gradient is 16 mmHg.    There is mild, bileaflet mitral valve thickening present.    Estimated right ventricular systolic pressure from tricuspid regurgitation is normal (<35 mmHg).    The study is technically difficult for diagnosis.     Results for orders placed during the hospital encounter of 03/07/23    Cardiac Catheterization/Vascular Study    Conclusion  OPERATORS  Eloisa Adrian M.D. (Attending Cardiologist)      PROCEDURES PERFORMED  Ultrasound guided Vascular access  Left Heart Catheterization  Coronary Angiogram  Moderate sedation  Primary PCI of the D2  IVUS of D2  PCI of mid LAD  IVUS of LAD    INDICATIONS FOR PROCEDURE  Lateral STEMI    PROCEDURE IN  DETAIL  Informed consent was obtained from the patient after explaining the risks, benefits, and alternative options of the procedure. After obtaining informed consent, the patient was brought to the cath lab and was prepped in a sterile fashion. Lidocaine 2% was used for local anesthesia into the right femoral access site. The right femoral artery was accessed with a micropuncture needle via modified Seldinger technique under ultrasound guidance. A 6F sheath was inserted successfully. Afterwards, 6F JR4 and JL4 diagnostic catheters were advanced over a wire into the ascending aorta and were used to engage the ostia of the left main and RCA respectively. JR4 used to cross the AV and obtain LV pressures and gradient across the AV measured via pullback technique. Images of the right and left coronary systems were obtained.      INTERVENTIONAL REPORT  Heparin was given to maintain an ACT more than 300.  6 Croatian XB LAD 3.5 guide was used to engage the left main.  Run-through wire was advanced to the distal D2.  This was predilated with trek 2.0 x 12 mm balloon.  IVUS of the diagonal showed plaque rupture with reference vessel diameter of 2.75 mm.  A run-through wire was advanced to the distal LAD.  The LAD was then predilated with a trek 2.0 x 12 mm balloon.  Next the diagonal was stented with a Xience 2.5 x 23 mm balloon.  This was postdilated with the stent balloon and then a previously positioned NC trek 2.5 x 12 mm balloon in the LAD was used to position and crush the diagonal stent.  I was unable to recross the diagonal stent with multiple balloons including a mini trek 1.25 x 12 mm balloon.  Next the LAD was further predilated with a mini trek 2.0 x 12 mm balloon followed by NC 2.5 x 12 mm balloon.  IVUS of the LAD showed reference vessel diameter of 3.75 mm.  The LAD was then stented with a Xience 3.0 x 12 mm stent followed by a Xience 3.0 X 18 mm stent.  IVUS post stent deployment showed underexpansion in the  mid segment.  This was then postdilated with NC Euphora 3.5 x 12 mm balloon to max pressure of 16 rosana.  KVNG-3 flow in the diagonal and LAD noted post PCI.  No residual stenosis.  No complications.    All the catheters were exchanged over a wire and subsequently removed. Angiogram of the femoral access site was obtained and did not show complications. The patient tolerated the procedure well without any complications. The pictures were reviewed at the end of the procedure. An Angio-Seal closure device was applied.    HEMODYNAMICS    LV: 163/3/15  AO: 153/86/117  Gradient 10 mmHg    FINDINGS  Coronary Angiogram    Right dominant circulation    Left main: Left main is a large caliber vessel which gives rise to the Left Anterior Descending and the Left circumflex. No angiographically significant stenosis    Left Anterior Descending Artery: LAD is a large caliber vessel which gives rise to several septal perforators and several diagonal branches.  There is 70% stenosis in the mid LAD.  There is hazy lesion in the proximal D2 with 90% stenosis.  This is the culprit vessel.  KVNG-3 flow.    Left Circumflex: Lcx is a large caliber vessel which gives rise to several OM branches.  There are luminal irregularities    Right Coronary Artery: The RCA is a large caliber vessel gives rise to PDA and PLV.  There is 30% stenosis in the mid RCA.    LESION INFORMATION  Culprit lesion  Diagonal 2  90% hazy lesion  Type C  KVNG-3 flow pre and post PCI  No residual stenosis  LAD  70% stenosis  Type C  KVNG-3 flow pre and post PCI  No residual stenosis    ESTIMATED BLOOD LOSS:  10 ml    COMPLICATIONS:  None    PROCEDURE DATA:  Contrast Used:  250cc  Sedation Time:  90 minutes    IMPRESSIONS  Two-vessel CAD involving the mid LAD and D2  Plaque rupture of the D2.  This is the culprit vessel  Normal left heart filling pressures  Mild gradient across aortic valve    RECOMMENDATIONS  Continue Integrilin until the bag runs out  Continue with  aspirin and Brilinta uninterrupted for 12 months  Statin and beta-blocker  Check lipid panel  Admit to ICU  Check echocardiogram  Cardiac rehab at discharge     Lab Results   Component Value Date    CHOL 160 03/08/2023    TRIG 168 (H) 03/08/2023    HDL 38 (L) 03/08/2023    LDL 93 03/08/2023      Results for orders placed during the hospital encounter of 05/30/23    Treadmill Stress Test - Rehab Protocol    Interpretation Summary    Patient exercised only 1 minute on Mauri protocol    Patient at the end of the procedure developed significant diaphoresis with bradycardia possible high degree AV block with significant artifact on EKG    Consider holding off on the cardiac rehab until patient is seen in the office again after the extended Holter monitor    Plans to reduce the dose of beta-blocker and arrange for extended Holter monitor and close monitoring in the office   Results for orders placed in visit on 05/30/23    Mobile Cardiac Outpatient Telemetry    Interpretation Summary  Extended Holter monitor study  Patient was monitored from May 30, 2023 to June 28, 2023  Underlying rhythm is sinus rhythm with a minimal heart rate of 50 bpm maximal heart rate of 120 bpm average heart rate of 82 bpm  No atrial fibrillation  No sustained ventricular or supraventricular tachyarrhythmia  No clinically significant pauses  First-degree AV block  Patient reported multiple symptomatic episodes with symptoms of lightheadedness and shortness of breath during that time patient noted to be in sinus rhythm with ventricular rate varying between 74bpm to 102 bpm with no significant cardiac arrhythmia  Relatively benign study  Clinical correlation is recommended           Objective:          Allergies:  No Known Allergies    Medication Review:     Current Outpatient Medications:     albuterol sulfate  (90 Base) MCG/ACT inhaler, , Disp: , Rfl:     aspirin 81 MG EC tablet, Take 1 tablet by mouth Daily., Disp: , Rfl:      levothyroxine (SYNTHROID, LEVOTHROID) 50 MCG tablet, Take 1 tablet by mouth Daily., Disp: , Rfl:     meclizine (ANTIVERT) 25 MG tablet, Take 0.5 tablets by mouth 3 (Three) Times a Day As Needed., Disp: , Rfl:     metoprolol tartrate (LOPRESSOR) 25 MG tablet, Take 1 tablet by mouth 2 (Two) Times a Day., Disp: 60 tablet, Rfl: 11    nitroglycerin (NITROSTAT) 0.4 MG SL tablet, , Disp: , Rfl:     ranolazine (RANEXA) 1000 MG 12 hr tablet, , Disp: , Rfl:     rosuvastatin (CRESTOR) 20 MG tablet, Take 1 tablet by mouth Every Night., Disp: , Rfl:     ticagrelor (BRILINTA) 90 MG tablet tablet, Take 1 tablet by mouth 2 (Two) Times a Day., Disp: 180 tablet, Rfl: 3    losartan (COZAAR) 50 MG tablet, Take 1 tablet by mouth Daily., Disp: 30 tablet, Rfl: 1    metFORMIN ER (GLUCOPHAGE-XR) 500 MG 24 hr tablet, 1 tablet Daily With Breakfast. (Patient not taking: Reported on 4/18/2024), Disp: , Rfl:     Family History:  Family History   Problem Relation Age of Onset    Hyperlipidemia Mother     Hyperlipidemia Father     Brain cancer Father        Past Medical History:  Past Medical History:   Diagnosis Date    Basal cell carcinoma of skin 9/22/2014    Coronary artery disease of native artery of native heart with stable angina pectoris 7/15/2019    COVID-19 vaccination declined     COVID-19 virus detected 1/27/2022    Essential hypertension 7/15/2019    Gastrointestinal hemorrhage with melena 5/9/2020    Glaucoma     Hyperlipidemia LDL goal <70 7/15/2019    Hypothyroidism        Past surgical History:  Past Surgical History:   Procedure Laterality Date    CARDIAC CATHETERIZATION  04/26/2019    No stents placed    CARDIAC CATHETERIZATION N/A 3/8/2023    Procedure: Left Heart Cath;  Surgeon: Eloisa Adrian MD;  Location: [facility] CATH INVASIVE LOCATION;  Service: Cardiology;  Laterality: N/A;    CARDIAC CATHETERIZATION N/A 3/8/2023    Procedure: Stent AJIT coronary;  Surgeon: Eloisa Adrian MD;  Location: [facility] CATH INVASIVE LOCATION;   Service: Cardiology;  Laterality: N/A;    CARDIAC CATHETERIZATION N/A 3/8/2023    Procedure: Percutaneous Coronary Intervention;  Surgeon: Eloisa Adrian MD;  Location: [facility] CATH INVASIVE LOCATION;  Service: Cardiology;  Laterality: N/A;    ENDOSCOPY N/A 5/10/2020    Procedure: ESOPHAGOGASTRODUODENOSCOPY  WITH BIOPSY X 1 AREA;  Surgeon: Chava Wood MD;  Location: [facility] ENDOSCOPY;  Service: Gastroenterology;  Laterality: N/A;  POST OP: DUODENAL ULCER, GASTRIC ULCER, HITAL HERNIA, ESOPHAGEAL STRICTURE    HERNIA REPAIR      INTERVENTIONAL RADIOLOGY PROCEDURE N/A 3/8/2023    Procedure: Intravascular Ultrasound;  Surgeon: Eloisa Adrian MD;  Location: [facility] CATH INVASIVE LOCATION;  Service: Cardiology;  Laterality: N/A;    OTHER SURGICAL HISTORY      lump on back of head removed    TOE SURGERY         Social History:  Social History     Socioeconomic History    Marital status:    Tobacco Use    Smoking status: Former    Smokeless tobacco: Never   Vaping Use    Vaping status: Never Used   Substance and Sexual Activity    Alcohol use: No    Drug use: No    Sexual activity: Defer       Review of Systems:  The following systems were reviewed as they relate to the cardiovascular system: Constitutional, Eyes, ENT, Cardiovascular, Respiratory, Gastrointestinal, Integumentary, Neurological, Psychiatric, Hematologic, Endocrine, Musculoskeletal, and Genitourinary. The pertinent cardiovascular findings are reported above with all other cardiovascular points within those systems being negative.    Diagnostic Study Review:     Current Electrocardiogram:    ECG 12 Lead    Date/Time: 4/18/2024 3:57 PM  Performed by: Hien Parisi MD    Authorized by: Hien Parisi MD  Comparison: compared with previous ECG   Similar to previous ECG  Rhythm: sinus rhythm  Rate: normal  BPM: 74  Conduction: conduction normal  ST Segments: ST segments normal  T Waves: T waves normal  QRS axis:  normal    Clinical impression: normal ECG                NOTE: The following portions of the patient's history were reviewed and updated this visit as appropriate: allergies, current medications, past family history, past medical history, past social history, past surgical history and problem list.

## 2024-07-24 ENCOUNTER — TELEPHONE (OUTPATIENT)
Dept: CARDIOLOGY | Facility: CLINIC | Age: 84
End: 2024-07-24

## 2024-07-24 NOTE — TELEPHONE ENCOUNTER
"Caller: Darrick Andrade \"Jhoan\"    Relationship: Self    Best call back number: [phone]     Which medication are you concerned about: YANDELILINIVAN    Who prescribed you this medication: DR TREVINO     When did you start taking this medication: SEVERAL MONTHS    What are your concerns: PT HAS BEEN NOTICING TROUBLE BREATHING WHEN HE IS UP AND WALKING OR IF HE LAYS ON HIS SIDE IN BED - PT STATES HE CAN BE BREATHING FINE ONE MOMENT AND STRUGGLING THE NEXT, PT SEEKING ADVISEMENT ON ALTERNATIVE MEDICATIONS DUE TO SYMPTOMS - PT STATES THE SYMPTOMS STARTED WHEN HE STARTED THE BRILINTA BUT HE DIDN'T REALIZE IT WAS RELATED UNTIL READING ON IT MORE - PT SEEKING ADVISEMENT     How long have you had these concerns:  RECENTLY  "

## 2024-07-26 RX ORDER — CLOPIDOGREL BISULFATE 75 MG/1
75 TABLET ORAL DAILY
Qty: 90 TABLET | Refills: 3 | Status: SHIPPED | OUTPATIENT
Start: 2024-07-26

## 2024-10-17 ENCOUNTER — OFFICE VISIT (OUTPATIENT)
Dept: CARDIOLOGY | Facility: CLINIC | Age: 84
End: 2024-10-17
Payer: MEDICARE

## 2024-10-17 VITALS
BODY MASS INDEX: 28.19 KG/M2 | HEIGHT: 71 IN | DIASTOLIC BLOOD PRESSURE: 68 MMHG | OXYGEN SATURATION: 96 % | WEIGHT: 201.4 LBS | SYSTOLIC BLOOD PRESSURE: 108 MMHG | HEART RATE: 77 BPM

## 2024-10-17 DIAGNOSIS — I25.10 CORONARY ARTERY DISEASE INVOLVING NATIVE CORONARY ARTERY OF NATIVE HEART WITHOUT ANGINA PECTORIS: Chronic | ICD-10-CM

## 2024-10-17 DIAGNOSIS — I10 ESSENTIAL HYPERTENSION: Chronic | ICD-10-CM

## 2024-10-17 DIAGNOSIS — E78.5 HYPERLIPIDEMIA LDL GOAL <70: Chronic | ICD-10-CM

## 2024-10-17 DIAGNOSIS — I21.02 STEMI INVOLVING LEFT ANTERIOR DESCENDING CORONARY ARTERY: Primary | ICD-10-CM

## 2024-10-17 DIAGNOSIS — I21.02 ST ELEVATION MYOCARDIAL INFARCTION INVOLVING LEFT ANTERIOR DESCENDING (LAD) CORONARY ARTERY: ICD-10-CM

## 2024-10-17 PROCEDURE — 99214 OFFICE O/P EST MOD 30 MIN: CPT | Performed by: INTERNAL MEDICINE

## 2024-10-17 PROCEDURE — 3078F DIAST BP <80 MM HG: CPT | Performed by: INTERNAL MEDICINE

## 2024-10-17 PROCEDURE — 1159F MED LIST DOCD IN RCRD: CPT | Performed by: INTERNAL MEDICINE

## 2024-10-17 PROCEDURE — 1160F RVW MEDS BY RX/DR IN RCRD: CPT | Performed by: INTERNAL MEDICINE

## 2024-10-17 PROCEDURE — 3074F SYST BP LT 130 MM HG: CPT | Performed by: INTERNAL MEDICINE

## 2024-10-17 PROCEDURE — 93000 ELECTROCARDIOGRAM COMPLETE: CPT | Performed by: INTERNAL MEDICINE

## 2024-10-17 NOTE — PROGRESS NOTES
Cardiology Office Visit      Encounter Date:  10/17/2024    Patient ID:   Darrick Andrade is a 84 y.o. male.    Reason For Followup:  Coronary artery disease  Hypertension  Hyperlipidemia      Brief Clinical History:  Dear Emily Etienne APRN    I had the pleasure of seeing Darrick Andrade today. As you are well aware, this is a 84 y.o. male with past medical history that is significant for history of  hypertension hyperlipidemia, currently on maximal medical therapy including aspirin statin beta-blocker and Imdur continued to have recurrent episodes of chest discomfort of the new suspicious for anginal equivalent.       Mr. Andrade presented to [facility] 3/8/2023 via EMS with complaint of chest pain. He reported his pain is retrosternal, intermittent which began the day prior.  EKG performed by EMS showed lateral wall STEMI with inferior reciprocal changes.  By the time he arrived to the emergency department, his chest pain had resolved as well as the acute EKG changes.  He was taken emergently to the cardiac Cath Lab by Dr. 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  He was started on dual antiplatelet therapy consisting of aspirin and Brilinta.  He was admitted to the intensive care unit for close monitoring of any reperfusion arrhythmias.  TTE that admission showed normal LV systolic function with EF 61-65, grade 1 DD, mild aortic valve stenosis.  He was started on isosorbide mononitrate 30 mg daily, losartan 50 mg daily.  There were no adverse events reported and he was discharged home on 3/10/2023       Interval History:  Patient denies any further symptoms of chest discomfort  Denies any symptoms of chest pain shortness of breath dizziness or syncope  No further episodes of dizziness or syncope  Compliant with  "medical therapy  Status post PCI and stenting of the LAD diagonal bifurcation in March 2023  Assessment & Plan    Impressions:  Coronary artery disease  Stable angina  Hypertension  Hyperlipidemia  Nonobstructing distal esophageal stricture  Recent hospitalization with active COVID-19 infection and syncope with no recurrence of symptoms  Status post PCI and stenting of the LAD diagonal bifurcation in March 2023  Myocardial infarction in March status post PCI and stenting of the LAD diagonal bifurcation    Recommendations:  Continue aspirin 81 mg p.o. once a day  Plavix 75 mg p.o. once a day  Continue current medical therapy with Crestor 20 mg p.o. once a day Ranexa 27810 mg p.o. twice daily metoprolol 12.5 mg p.o. twice daily losartan 25 mg p.o. once a day  Risk benefits and alternatives reviewed and discussed with patient  Stable from cardiac standpoint  Labs with primary care physician office  Labs workup and recent medical records reviewed and discussed with patient  Follow-up in office in 6 months        Vitals:  Vitals:    10/17/24 1410   BP: 108/68   Pulse: 77   SpO2: 96%   Weight: 91.4 kg (201 lb 6.4 oz)   Height: 180.3 cm (71\")       Physical Exam:    General: Alert, cooperative, no distress, appears stated age  Head:  Normocephalic, atraumatic, mucous membranes moist  Eyes:  Conjunctiva/corneas clear, EOM's intact     Neck:  Supple,  no adenopathy;      Lungs: Clear to auscultation bilaterally, no wheezes rhonchi rales are noted  Chest wall: No tenderness  Heart::  Regular rate and rhythm, S1 and S2 normal, no murmur, rub or gallop  Abdomen: Soft, non-tender, nondistended bowel sounds active  Extremities: No cyanosis, clubbing, or edema  Pulses: 2+ and symmetric all extremities  Skin:  No rashes or lesions  Neuro/psych: A&O x3. CN II through XII are grossly intact with appropriate affect              Lab Results   Component Value Date    GLUCOSE 113 (H) 03/10/2023    BUN 18 03/10/2023    CREATININE 1.12 " 03/10/2023    EGFR 65.6 03/10/2023    BCR 16.1 03/10/2023    K 3.7 03/10/2023    CO2 23.0 03/10/2023    CALCIUM 8.9 03/10/2023    ALBUMIN 3.8 03/10/2023    BILITOT 0.9 03/10/2023    AST 44 (H) 03/10/2023    ALT 18 03/10/2023     Results for orders placed during the hospital encounter of 03/07/23    Adult Transthoracic Echo Complete W/ Cont if Necessary Per Protocol    Interpretation Summary    Left ventricular systolic function is normal. Left ventricular ejection fraction appears to be 61 - 65%.    Left ventricular wall thickness is consistent with mild concentric hypertrophy.    Left ventricular diastolic function is consistent with (grade I) impaired relaxation.    Mild aortic valve stenosis is present.    Aortic valve maximum pressure gradient is 32 mmHg. Aortic valve mean pressure gradient is 16 mmHg.    There is mild, bileaflet mitral valve thickening present.    Estimated right ventricular systolic pressure from tricuspid regurgitation is normal (<35 mmHg).    The study is technically difficult for diagnosis.     Results for orders placed during the hospital encounter of 03/07/23    Cardiac Catheterization/Vascular Study    Conclusion  OPERATORS  Eloisa Adrian M.D. (Attending Cardiologist)      PROCEDURES PERFORMED  Ultrasound guided Vascular access  Left Heart Catheterization  Coronary Angiogram  Moderate sedation  Primary PCI of the D2  IVUS of D2  PCI of mid LAD  IVUS of LAD    INDICATIONS FOR PROCEDURE  Lateral STEMI    PROCEDURE IN DETAIL  Informed consent was obtained from the patient after explaining the risks, benefits, and alternative options of the procedure. After obtaining informed consent, the patient was brought to the cath lab and was prepped in a sterile fashion. Lidocaine 2% was used for local anesthesia into the right femoral access site. The right femoral artery was accessed with a micropuncture needle via modified Seldinger technique under ultrasound guidance. A 6F sheath was inserted  successfully. Afterwards, 6F JR4 and JL4 diagnostic catheters were advanced over a wire into the ascending aorta and were used to engage the ostia of the left main and RCA respectively. JR4 used to cross the AV and obtain LV pressures and gradient across the AV measured via pullback technique. Images of the right and left coronary systems were obtained.      INTERVENTIONAL REPORT  Heparin was given to maintain an ACT more than 300.  6 [place] XB LAD 3.5 guide was used to engage the left main.  Run-through wire was advanced to the distal D2.  This was predilated with trek 2.0 x 12 mm balloon.  IVUS of the diagonal showed plaque rupture with reference vessel diameter of 2.75 mm.  A run-through wire was advanced to the distal LAD.  The LAD was then predilated with a trek 2.0 x 12 mm balloon.  Next the diagonal was stented with a Xience 2.5 x 23 mm balloon.  This was postdilated with the stent balloon and then a previously positioned NC trek 2.5 x 12 mm balloon in the LAD was used to position and crush the diagonal stent.  I was unable to recross the diagonal stent with multiple balloons including a mini trek 1.25 x 12 mm balloon.  Next the LAD was further predilated with a mini trek 2.0 x 12 mm balloon followed by NC 2.5 x 12 mm balloon.  IVUS of the LAD showed reference vessel diameter of 3.75 mm.  The LAD was then stented with a Xience 3.0 x 12 mm stent followed by a Xience 3.0 X 18 mm stent.  IVUS post stent deployment showed underexpansion in the mid segment.  This was then postdilated with NC Euphora 3.5 x 12 mm balloon to max pressure of 16 rosana.  KVNG-3 flow in the diagonal and LAD noted post PCI.  No residual stenosis.  No complications.    All the catheters were exchanged over a wire and subsequently removed. Angiogram of the femoral access site was obtained and did not show complications. The patient tolerated the procedure well without any complications. The pictures were reviewed at the end of the procedure.  An Angio-Seal closure device was applied.    HEMODYNAMICS    LV: 163/3/15  AO: 153/86/117  Gradient 10 mmHg    FINDINGS  Coronary Angiogram    Right dominant circulation    Left main: Left main is a large caliber vessel which gives rise to the Left Anterior Descending and the Left circumflex. No angiographically significant stenosis    Left Anterior Descending Artery: LAD is a large caliber vessel which gives rise to several septal perforators and several diagonal branches.  There is 70% stenosis in the mid LAD.  There is hazy lesion in the proximal D2 with 90% stenosis.  This is the culprit vessel.  KVNG-3 flow.    Left Circumflex: Lcx is a large caliber vessel which gives rise to several OM branches.  There are luminal irregularities    Right Coronary Artery: The RCA is a large caliber vessel gives rise to PDA and PLV.  There is 30% stenosis in the mid RCA.    LESION INFORMATION  Culprit lesion  Diagonal 2  90% hazy lesion  Type C  KVNG-3 flow pre and post PCI  No residual stenosis  LAD  70% stenosis  Type C  KVNG-3 flow pre and post PCI  No residual stenosis    ESTIMATED BLOOD LOSS:  10 ml    COMPLICATIONS:  None    PROCEDURE DATA:  Contrast Used:  250cc  Sedation Time:  90 minutes    IMPRESSIONS  Two-vessel CAD involving the mid LAD and D2  Plaque rupture of the D2.  This is the culprit vessel  Normal left heart filling pressures  Mild gradient across aortic valve    RECOMMENDATIONS  Continue Integrilin until the bag runs out  Continue with aspirin and Brilinta uninterrupted for 12 months  Statin and beta-blocker  Check lipid panel  Admit to ICU  Check echocardiogram  Cardiac rehab at discharge     Lab Results   Component Value Date    CHOL 160 03/08/2023    TRIG 168 (H) 03/08/2023    HDL 38 (L) 03/08/2023    LDL 93 03/08/2023      Results for orders placed during the hospital encounter of 05/30/23    Treadmill Stress Test - Rehab Protocol    Interpretation Summary    Patient exercised only 1 minute on Mauri  protocol    Patient at the end of the procedure developed significant diaphoresis with bradycardia possible high degree AV block with significant artifact on EKG    Consider holding off on the cardiac rehab until patient is seen in the office again after the extended Holter monitor    Plans to reduce the dose of beta-blocker and arrange for extended Holter monitor and close monitoring in the office   Results for orders placed in visit on 05/30/23    Mobile Cardiac Outpatient Telemetry    Interpretation Summary  Extended Holter monitor study  Patient was monitored from May 30, 2023 to June 28, 2023  Underlying rhythm is sinus rhythm with a minimal heart rate of 50 bpm maximal heart rate of 120 bpm average heart rate of 82 bpm  No atrial fibrillation  No sustained ventricular or supraventricular tachyarrhythmia  No clinically significant pauses  First-degree AV block  Patient reported multiple symptomatic episodes with symptoms of lightheadedness and shortness of breath during that time patient noted to be in sinus rhythm with ventricular rate varying between 74bpm to 102 bpm with no significant cardiac arrhythmia  Relatively benign study  Clinical correlation is recommended           Objective:          Allergies:  No Known Allergies    Medication Review:     Current Outpatient Medications:     albuterol sulfate  (90 Base) MCG/ACT inhaler, , Disp: , Rfl:     aspirin 81 MG EC tablet, Take 1 tablet by mouth Daily., Disp: , Rfl:     clopidogrel (PLAVIX) 75 MG tablet, Take 1 tablet by mouth Daily., Disp: 90 tablet, Rfl: 3    levothyroxine (SYNTHROID, LEVOTHROID) 50 MCG tablet, Take 1 tablet by mouth Daily., Disp: , Rfl:     losartan (COZAAR) 50 MG tablet, Take 1 tablet by mouth Daily. (Patient taking differently: Take 1 tablet by mouth Daily. Patient is taking 1/2 tablets daily), Disp: 30 tablet, Rfl: 1    meclizine (ANTIVERT) 25 MG tablet, Take 0.5 tablets by mouth 3 (Three) Times a Day As Needed., Disp: , Rfl:      metoprolol tartrate (LOPRESSOR) 25 MG tablet, TAKE 1 TABLET BY MOUTH TWICE A DAY (Patient taking differently: Take 1 tablet by mouth 2 (Two) Times a Day. Patient is taking 1/2 tablets BID), Disp: 180 tablet, Rfl: 0    nitroglycerin (NITROSTAT) 0.4 MG SL tablet, , Disp: , Rfl:     ranolazine (RANEXA) 1000 MG 12 hr tablet, , Disp: , Rfl:     rosuvastatin (CRESTOR) 20 MG tablet, Take 1 tablet by mouth Every Night., Disp: , Rfl:     Family History:  Family History   Problem Relation Age of Onset    Hyperlipidemia Mother     Hyperlipidemia Father     Brain cancer Father        Past Medical History:  Past Medical History:   Diagnosis Date    Basal cell carcinoma of skin 9/22/2014    Coronary artery disease of native artery of native heart with stable angina pectoris 7/15/2019    COVID-19 vaccination declined     COVID-19 virus detected 1/27/2022    Essential hypertension 7/15/2019    Gastrointestinal hemorrhage with melena 5/9/2020    Glaucoma     Hyperlipidemia LDL goal <70 7/15/2019    Hypothyroidism        Past surgical History:  Past Surgical History:   Procedure Laterality Date    CARDIAC CATHETERIZATION  04/26/2019    No stents placed    CARDIAC CATHETERIZATION N/A 3/8/2023    Procedure: Left Heart Cath;  Surgeon: Eloisa Adrian MD;  Location: [facility] CATH INVASIVE LOCATION;  Service: Cardiology;  Laterality: N/A;    CARDIAC CATHETERIZATION N/A 3/8/2023    Procedure: Stent AJIT coronary;  Surgeon: Eloisa Adrina MD;  Location: [facility] CATH INVASIVE LOCATION;  Service: Cardiology;  Laterality: N/A;    CARDIAC CATHETERIZATION N/A 3/8/2023    Procedure: Percutaneous Coronary Intervention;  Surgeon: Eloisa Adrian MD;  Location: [facility] CATH INVASIVE LOCATION;  Service: Cardiology;  Laterality: N/A;    ENDOSCOPY N/A 5/10/2020    Procedure: ESOPHAGOGASTRODUODENOSCOPY  WITH BIOPSY X 1 AREA;  Surgeon: Chava Wood MD;  Location: [facility] ENDOSCOPY;  Service: Gastroenterology;  Laterality: N/A;  POST OP:  DUODENAL ULCER, GASTRIC ULCER, HITAL HERNIA, ESOPHAGEAL STRICTURE    HERNIA REPAIR      INTERVENTIONAL RADIOLOGY PROCEDURE N/A 3/8/2023    Procedure: Intravascular Ultrasound;  Surgeon: Eloisa Adrian MD;  Location: [facility] INVASIVE LOCATION;  Service: Cardiology;  Laterality: N/A;    OTHER SURGICAL HISTORY      lump on back of head removed    TOE SURGERY         Social History:  Social History     Socioeconomic History    Marital status:    Tobacco Use    Smoking status: Former    Smokeless tobacco: Never   Vaping Use    Vaping status: Never Used   Substance and Sexual Activity    Alcohol use: No    Drug use: No    Sexual activity: Defer       Review of Systems:  The following systems were reviewed as they relate to the cardiovascular system: Constitutional, Eyes, ENT, Cardiovascular, Respiratory, Gastrointestinal, Integumentary, Neurological, Psychiatric, Hematologic, Endocrine, Musculoskeletal, and Genitourinary. The pertinent cardiovascular findings are reported above with all other cardiovascular points within those systems being negative.    Diagnostic Study Review:     Current Electrocardiogram:    ECG 12 Lead    Date/Time: 10/17/2024 2:36 PM  Performed by: Hien Parisi MD    Authorized by: Hien Parisi MD  Comparison: compared with previous ECG   Similar to previous ECG  Rhythm: sinus rhythm  Rate: normal  BPM: 77  Conduction: conduction normal  QRS axis: normal  Other findings: non-specific ST-T wave changes and left ventricular hypertrophy    Clinical impression: abnormal EKG                NOTE: The following portions of the patient's history were reviewed and updated this visit as appropriate: allergies, current medications, past family history, past medical history, past social history, past surgical history and problem list.

## 2025-01-20 ENCOUNTER — TRANSCRIBE ORDERS (OUTPATIENT)
Dept: ADMINISTRATIVE | Facility: HOSPITAL | Age: 85
End: 2025-01-20
Payer: MEDICARE

## 2025-01-20 ENCOUNTER — HOSPITAL ENCOUNTER (OUTPATIENT)
Dept: GENERAL RADIOLOGY | Facility: HOSPITAL | Age: 85
Discharge: HOME | End: 2025-01-20
Admitting: NURSE PRACTITIONER
Payer: MEDICARE

## 2025-01-20 DIAGNOSIS — R05.9 COUGH, UNSPECIFIED TYPE: Primary | ICD-10-CM

## 2025-01-20 DIAGNOSIS — R05.9 COUGH, UNSPECIFIED TYPE: ICD-10-CM

## 2025-01-20 PROCEDURE — 71046 X-RAY EXAM CHEST 2 VIEWS: CPT

## 2025-03-18 ENCOUNTER — HOSPITAL ENCOUNTER (EMERGENCY)
Facility: HOSPITAL | Age: 85
Discharge: HOME | End: 2025-03-18
Attending: EMERGENCY MEDICINE | Admitting: EMERGENCY MEDICINE
Payer: MEDICARE

## 2025-03-18 ENCOUNTER — APPOINTMENT (OUTPATIENT)
Dept: CT IMAGING | Facility: HOSPITAL | Age: 85
End: 2025-03-18
Payer: MEDICARE

## 2025-03-18 VITALS
TEMPERATURE: 98 F | HEART RATE: 80 BPM | DIASTOLIC BLOOD PRESSURE: 76 MMHG | OXYGEN SATURATION: 98 % | RESPIRATION RATE: 16 BRPM | BODY MASS INDEX: 27.77 KG/M2 | SYSTOLIC BLOOD PRESSURE: 131 MMHG | WEIGHT: 194 LBS | HEIGHT: 70 IN

## 2025-03-18 DIAGNOSIS — R42 DIZZINESS: Primary | ICD-10-CM

## 2025-03-18 DIAGNOSIS — R55 SYNCOPE, UNSPECIFIED SYNCOPE TYPE: ICD-10-CM

## 2025-03-18 LAB
ALBUMIN SERPL-MCNC: 3.6 G/DL (ref 3.5–5.2)
ALBUMIN/GLOB SERPL: 1.1 G/DL
ALP SERPL-CCNC: 42 U/L (ref 39–117)
ALT SERPL W P-5'-P-CCNC: 11 U/L (ref 1–41)
ANION GAP SERPL CALCULATED.3IONS-SCNC: 10 MMOL/L (ref 5–15)
AST SERPL-CCNC: 29 U/L (ref 1–40)
BASOPHILS # BLD AUTO: 0.02 10*3/MM3 (ref 0–0.2)
BASOPHILS NFR BLD AUTO: 0.3 % (ref 0–1.5)
BILIRUB SERPL-MCNC: 0.4 MG/DL (ref 0–1.2)
BUN SERPL-MCNC: 22 MG/DL (ref 8–23)
BUN/CREAT SERPL: 18 (ref 7–25)
CALCIUM SPEC-SCNC: 8.7 MG/DL (ref 8.6–10.5)
CHLORIDE SERPL-SCNC: 103 MMOL/L (ref 98–107)
CO2 SERPL-SCNC: 23 MMOL/L (ref 22–29)
CREAT SERPL-MCNC: 1.22 MG/DL (ref 0.76–1.27)
DEPRECATED RDW RBC AUTO: 49.8 FL (ref 37–54)
EGFRCR SERPLBLD CKD-EPI 2021: 58.5 ML/MIN/1.73
EOSINOPHIL # BLD AUTO: 0.05 10*3/MM3 (ref 0–0.4)
EOSINOPHIL NFR BLD AUTO: 0.7 % (ref 0.3–6.2)
ERYTHROCYTE [DISTWIDTH] IN BLOOD BY AUTOMATED COUNT: 13.6 % (ref 12.3–15.4)
GEN 5 1HR TROPONIN T REFLEX: 21 NG/L
GLOBULIN UR ELPH-MCNC: 3.2 GM/DL
GLUCOSE SERPL-MCNC: 104 MG/DL (ref 65–99)
HCT VFR BLD AUTO: 42.5 % (ref 37.5–51)
HGB BLD-MCNC: 13.5 G/DL (ref 13–17.7)
IMM GRANULOCYTES # BLD AUTO: 0.03 10*3/MM3 (ref 0–0.05)
IMM GRANULOCYTES NFR BLD AUTO: 0.4 % (ref 0–0.5)
LYMPHOCYTES # BLD AUTO: 1.29 10*3/MM3 (ref 0.7–3.1)
LYMPHOCYTES NFR BLD AUTO: 17.8 % (ref 19.6–45.3)
MCH RBC QN AUTO: 31.3 PG (ref 26.6–33)
MCHC RBC AUTO-ENTMCNC: 31.8 G/DL (ref 31.5–35.7)
MCV RBC AUTO: 98.4 FL (ref 79–97)
MONOCYTES # BLD AUTO: 0.9 10*3/MM3 (ref 0.1–0.9)
MONOCYTES NFR BLD AUTO: 12.4 % (ref 5–12)
NEUTROPHILS NFR BLD AUTO: 4.96 10*3/MM3 (ref 1.7–7)
NEUTROPHILS NFR BLD AUTO: 68.4 % (ref 42.7–76)
NRBC BLD AUTO-RTO: 0 /100 WBC (ref 0–0.2)
PLATELET # BLD AUTO: 174 10*3/MM3 (ref 140–450)
PMV BLD AUTO: 9.6 FL (ref 6–12)
POTASSIUM SERPL-SCNC: 4.3 MMOL/L (ref 3.5–5.2)
PROT SERPL-MCNC: 6.8 G/DL (ref 6–8.5)
RBC # BLD AUTO: 4.32 10*6/MM3 (ref 4.14–5.8)
SODIUM SERPL-SCNC: 136 MMOL/L (ref 136–145)
TROPONIN T % DELTA: -5
TROPONIN T NUMERIC DELTA: -1 NG/L
TROPONIN T SERPL HS-MCNC: 22 NG/L
WBC NRBC COR # BLD AUTO: 7.25 10*3/MM3 (ref 3.4–10.8)

## 2025-03-18 PROCEDURE — 99284 EMERGENCY DEPT VISIT MOD MDM: CPT

## 2025-03-18 PROCEDURE — 70450 CT HEAD/BRAIN W/O DYE: CPT

## 2025-03-18 PROCEDURE — 72125 CT NECK SPINE W/O DYE: CPT

## 2025-03-18 PROCEDURE — 36415 COLL VENOUS BLD VENIPUNCTURE: CPT

## 2025-03-18 PROCEDURE — 93005 ELECTROCARDIOGRAM TRACING: CPT | Performed by: EMERGENCY MEDICINE

## 2025-03-18 PROCEDURE — 85025 COMPLETE CBC W/AUTO DIFF WBC: CPT

## 2025-03-18 PROCEDURE — 84484 ASSAY OF TROPONIN QUANT: CPT

## 2025-03-18 PROCEDURE — 80053 COMPREHEN METABOLIC PANEL: CPT

## 2025-03-18 RX ORDER — METOPROLOL TARTRATE 25 MG/1
12.5 TABLET, FILM COATED ORAL 2 TIMES DAILY
COMMUNITY

## 2025-03-18 RX ORDER — LOSARTAN POTASSIUM 50 MG/1
25 TABLET ORAL 2 TIMES DAILY
COMMUNITY

## 2025-03-18 RX ORDER — SODIUM CHLORIDE 0.9 % (FLUSH) 0.9 %
10 SYRINGE (ML) INJECTION AS NEEDED
Status: DISCONTINUED | OUTPATIENT
Start: 2025-03-18 | End: 2025-03-18 | Stop reason: HOSPADM

## 2025-03-18 RX ORDER — RANOLAZINE 1000 MG/1
1000 TABLET, EXTENDED RELEASE ORAL EVERY 12 HOURS SCHEDULED
COMMUNITY

## 2025-03-18 NOTE — ED NOTES
Contrary to EMS report, pt states he fell backwards and lost consciousness. Pt has a long abrasion to R side of back as well as scattered small bruises on the back. Pt also has small bruise to R temporal area and small cut to R side of nose. Pt does take a blood thinner.   Not orthostatic.

## 2025-03-18 NOTE — ED NOTES
Pt arrived to ED this morning via EMS after having a syncopal episode in the bathroom at home. Family states he did loose consciousness for a short amount of time. EMS reported soft pressures on ride to ED and that pt hit is nose during fall this AM.

## 2025-03-18 NOTE — ED PROVIDER NOTES
Subjective   History of Present Illness  Chief Complaint: Fall, dizziness      HPI: Patient is an 85-year-old male who presents by EMS states this morning he was getting his medications together when he became off balance feeling dizzy causing him to fall, states that his wife heard him fall and there was no prolonged downtime.  Did have a bloody nose.  Is currently on blood thinners.  Has not taken his medications today.  Denies recent illness or exposure there was no associated chest pain or shortness of breath.    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/15/2019    COVID-19 vaccination declined     COVID-19 virus detected 1/27/2022    Essential hypertension 7/15/2019    Gastrointestinal hemorrhage with melena 5/9/2020    Glaucoma     Hyperlipidemia LDL goal <70 7/15/2019    Hypothyroidism        No Known Allergies    Past Surgical History:   Procedure Laterality Date    CARDIAC CATHETERIZATION  04/26/2019    No stents placed    CARDIAC CATHETERIZATION N/A 03/08/2023    Procedure: Left Heart Cath;  Surgeon: Eloisa Adrian MD;  Location: [facility] CATH INVASIVE LOCATION;  Service: Cardiology;  Laterality: N/A;    CARDIAC CATHETERIZATION N/A 03/08/2023    Procedure: Stent AJIT coronary;  Surgeon: Eloisa Adrian MD;  Location: [facility] CATH INVASIVE LOCATION;  Service: Cardiology;  Laterality: N/A;    CARDIAC CATHETERIZATION N/A 03/08/2023    Procedure: Percutaneous Coronary Intervention;  Surgeon: Eloisa Adrian MD;  Location: [facility] CATH INVASIVE LOCATION;  Service: Cardiology;  Laterality: N/A;    ENDOSCOPY N/A 05/10/2020    Procedure: ESOPHAGOGASTRODUODENOSCOPY  WITH BIOPSY X 1 AREA;  Surgeon: Chava Wood MD;  Location: [facility] ENDOSCOPY;  Service: Gastroenterology;  Laterality:  N/A;  POST OP: DUODENAL ULCER, GASTRIC ULCER, HITAL HERNIA, ESOPHAGEAL STRICTURE    HERNIA REPAIR      INTERVENTIONAL RADIOLOGY PROCEDURE N/A 03/08/2023    Procedure: Intravascular Ultrasound;  Surgeon: Eloisa Adrian MD;  Location: [facility] INVASIVE LOCATION;  Service: Cardiology;  Laterality: N/A;    OTHER SURGICAL HISTORY      lump on back of head removed  lump on top of head    TOE SURGERY         Family History   Problem Relation Age of Onset    Hyperlipidemia Mother     Hyperlipidemia Father     Brain cancer Father        Social History     Socioeconomic History    Marital status:    Tobacco Use    Smoking status: Former     Types: Cigarettes, Cigars     Passive exposure: Never    Smokeless tobacco: Never    Tobacco comments:     Quit smoking 1975  smoked not quite 1 ppd   Vaping Use    Vaping status: Never Used   Substance and Sexual Activity    Alcohol use: No    Drug use: No    Sexual activity: Defer           Objective   Physical Exam  Vitals reviewed.   Constitutional:       General: He is not in acute distress.     Appearance: He is not toxic-appearing.   HENT:      Head: Normocephalic.      Nose:      Right Nostril: No foreign body or septal hematoma.      Left Nostril: No foreign body, septal hematoma or occlusion.      Comments: Dried blood noted at the left nare.  Eyes:      Pupils: Pupils are equal, round, and reactive to light.   Cardiovascular:      Rate and Rhythm: Normal rate.      Pulses: Normal pulses.   Pulmonary:      Effort: Pulmonary effort is normal.   Skin:     General: Skin is warm.      Findings: Ecchymosis present.          Neurological:      General: No focal deficit present.      Mental Status: He is alert and oriented to person, place, and time.         Procedures           ED Course  ED Course as of 03/18/25 1345   Tue Mar 18, 2025   1119 HS Troponin T(!): 22 []   1207 Orthostatic vital signs documented:   Lying 142/76- HR 75  Sitting 127/81 - 81  Standing 133/78 HR  "81 [BH]      ED Course User Index  [] Leticia Barnett, LALI      /66   Pulse 85   Temp 97.8 °F (36.6 °C) (Oral)   Resp 13   Ht 177.8 cm (70\")   Wt 88 kg (194 lb)   SpO2 93%   BMI 27.84 kg/m²   Labs Reviewed   COMPREHENSIVE METABOLIC PANEL - Abnormal; Notable for the following components:       Result Value    Glucose 104 (*)     eGFR 58.5 (*)     All other components within normal limits    Narrative:     GFR Categories in Chronic Kidney Disease (CKD)      GFR Category          GFR (mL/min/1.73)    Interpretation  G1                     90 or greater         Normal or high (1)  G2                      60-89                Mild decrease (1)  G3a                   45-59                Mild to moderate decrease  G3b                   30-44                Moderate to severe decrease  G4                    15-29                Severe decrease  G5                    14 or less           Kidney failure          (1)In the absence of evidence of kidney disease, neither GFR category G1 or G2 fulfill the criteria for CKD.    eGFR calculation 2021 CKD-EPI creatinine equation, which does not include race as a factor   CBC WITH AUTO DIFFERENTIAL - Abnormal; Notable for the following components:    MCV 98.4 (*)     Lymphocyte % 17.8 (*)     Monocyte % 12.4 (*)     All other components within normal limits   TROPONIN - Abnormal; Notable for the following components:    HS Troponin T 22 (*)     All other components within normal limits    Narrative:     High Sensitive Troponin T Reference Range:  <14.0 ng/L- Negative Female for AMI  <22.0 ng/L- Negative Male for AMI  >=14 - Abnormal Female indicating possible myocardial injury.  >=22 - Abnormal Male indicating possible myocardial injury.   Clinicians would have to utilize clinical acumen, EKG, Troponin, and serial changes to determine if it is an Acute Myocardial Infarction or myocardial injury due to an underlying chronic condition.        HIGH SENSITIVITIY " TROPONIN T 1HR    Narrative:     High Sensitive Troponin T Reference Range:  <14.0 ng/L- Negative Female for AMI  <22.0 ng/L- Negative Male for AMI  >=14 - Abnormal Female indicating possible myocardial injury.  >=22 - Abnormal Male indicating possible myocardial injury.   Clinicians would have to utilize clinical acumen, EKG, Troponin, and serial changes to determine if it is an Acute Myocardial Infarction or myocardial injury due to an underlying chronic condition.        CBC AND DIFFERENTIAL    Narrative:     The following orders were created for panel order CBC & Differential.  Procedure                               Abnormality         Status                     ---------                               -----------         ------                     CBC Auto Differential[[id number]]        Abnormal            Final result                 Please view results for these tests on the individual orders.     Medications   sodium chloride 0.9 % flush 10 mL (has no administration in time range)     CT Head Without Contrast  Result Date: 3/18/2025  Impression: 1. No evidence of intracranial injury. 2. No evidence of cervical spine fracture Electronically Signed: Roger Del Toro  3/18/2025 10:43 AM EDT  Workstation ID: [id number]    CT Cervical Spine Without Contrast  Result Date: 3/18/2025  Impression: 1. No evidence of intracranial injury. 2. No evidence of cervical spine fracture Electronically Signed: Roger Del Toro  3/18/2025 10:43 AM EDT  Workstation ID: [id number]                                                     Medical Decision Making  Patient presented with above complaints, noted physical exam was completed, was placed on a cardiac monitor and an IV was established. Orthostatic vital signs completed, patient had a slight drop in systolic, though no symptoms were reproduced.  He has been monitored for over four hours withouth dizziness or acute abnormality.  CT of the head and neck were obtained, negative for acute  intracranial hemorrhage or fracture.  Labs were essentially unremarkable.  Patient feels that his dizzy spell this morning, following by reported syncopal episode is related to his chronic vertigo and fast position change.  Chart review notes mild carotid stenosis, he had never had an MRI or follow up with neurology related to chronic dizziness. States he has completed physical therapy in the past which did help with the reccurence of his dizziness.  I explained at bedside that the patient would likely benefit from admission, continued monitoring and additional testing.  Patient declined stating that he would like to go home and follow up with PCP.  Discussed worrisome symptoms to monitor for and when to return to the ED.  Wife at bedside has given verbal understanding of these instructions as well.  Deny further questions or complaints at time of discharge.     Patient case discussed with Dr. Alexander     Chart review:  1/28/2022 Carotid ultrasound  · Proximal right internal carotid artery mild stenosis.  · Proximal left internal carotid artery mild stenosis.    7/21/2021 stress test  · Findings consistent with a normal ECG stress test.  · Left ventricular ejection fraction is normal. (Calculated EF = 70%).  · Myocardial perfusion imaging indicates a normal myocardial perfusion study with no evidence of ischemia.  · Impressions are consistent with a low risk study.      Labs: trop 22, delta trop 21.      Radiology: Imaging reviewed by me and interpreted by radiologist.    Medications Medications  sodium chloride 0.9 % flush 10 mL (has no administration in time range)    Part of this note may be an electronic transcription/translation of spoken language to printed text using the Dragon Dictation System.    Appropriate PPE worn during exam.      Note Disclaimer: At [facility], we believe that sharing information builds trust and better  relationships. You are receiving this note because you recently visited [facility]. It is possible you will see health information before a provider has talked with you about it. This kind of information can be easy to misunderstand. To help you fully understand what it means for your health, we urge you to discuss this note with your provider.      Problems Addressed:  Dizziness: complicated acute illness or injury  Syncope, unspecified syncope type: complicated acute illness or injury    Amount and/or Complexity of Data Reviewed  Labs: ordered. Decision-making details documented in ED Course.  Radiology: ordered and independent interpretation performed. Decision-making details documented in ED Course.  ECG/medicine tests: ordered and independent interpretation performed. Decision-making details documented in ED Course.    Risk  Prescription drug management.        Final diagnoses:   Dizziness   Syncope, unspecified syncope type       ED Disposition  ED Disposition       ED Disposition   Discharge    Condition   Stable    Comment   --               Emily Batres, APRN  [address] [id number]  [phone]          [facility] PHYSICAL THERAP[facility]  [address] [place] [address]  [phone]  Call in 1 week           Medication List        Changed      losartan 50 MG tablet  Commonly known as: COZAAR  Take 1 tablet by mouth Daily.  What changed: additional instructions     metoprolol tartrate 25 MG tablet  Commonly known as: LOPRESSOR  TAKE 1 TABLET BY MOUTH TWICE A DAY  What changed: additional instructions     ranolazine 1000 MG 12 hr tablet  Commonly known as: RANEXA  What changed: See the new instructions.                 Leticia Barnett, APRN  03/18/25 2557

## 2025-03-19 LAB
QT INTERVAL: 410 MS
QTC INTERVAL: 457 MS

## 2025-07-17 RX ORDER — CLOPIDOGREL BISULFATE 75 MG/1
75 TABLET ORAL DAILY
Qty: 90 TABLET | Refills: 3 | Status: SHIPPED | OUTPATIENT
Start: 2025-07-17

## 2025-07-22 RX ORDER — CLOPIDOGREL BISULFATE 75 MG/1
75 TABLET ORAL DAILY
Qty: 90 TABLET | Refills: 3 | Status: SHIPPED | OUTPATIENT
Start: 2025-07-22

## (undated) DEVICE — DEV INFL COMPAK W/ACCESSPLUS IN4530

## (undated) DEVICE — PK ENDO GI 50

## (undated) DEVICE — BITEBLOCK ENDO W/STRAP 60F A/ LF DISP

## (undated) DEVICE — CONTRST ISOVUE300 61PCT 50ML

## (undated) DEVICE — BALN NC/EUPHORA RX 2.50X12MM

## (undated) DEVICE — 6F .070 XB LAD 3.5 100CM: Brand: VISTA BRITE TIP

## (undated) DEVICE — CATH IMG IVUS EAGLE EYE RAPDXNG PLAT SHT/TP 5F .014IN

## (undated) DEVICE — TBG NAMIC PRESS MONTR A/ F/M 12IN

## (undated) DEVICE — GW PTFE EMERALD HEPCOAT FC J TIP STD .035 3MM 150CM

## (undated) DEVICE — CATH DIAG IMPULSE FL4 6F 100CM

## (undated) DEVICE — MINI TREK CORONARY DILATATION CATHETER 2.0 MM X 12 MM / RAPID-EXCHANGE: Brand: MINI TREK

## (undated) DEVICE — GW RUNTHROUGH NS HYPERCOAT .014 3X180CM

## (undated) DEVICE — CATH DIAG IMPULSE PIG .056 6F 110CM

## (undated) DEVICE — CATH DIAG IMPULSE FR4 6F 100CM

## (undated) DEVICE — FRCP BIOP COLD ENDOJAW ALLGTR CUP 2MM/CH 155CM

## (undated) DEVICE — PK TRY HEART CATH 50

## (undated) DEVICE — SYR LL TP 10ML STRL

## (undated) DEVICE — ANGIO-SEAL VIP VASCULAR CLOSURE DEVICE: Brand: ANGIO-SEAL

## (undated) DEVICE — PAPR PRNT PK SONY W RIBN UPC55

## (undated) DEVICE — PINNACLE INTRODUCER SHEATH: Brand: PINNACLE

## (undated) DEVICE — BALN NC/EUPHORA RX 3.50X12MM

## (undated) DEVICE — ST ACC MICROPUNCTURE STFF/CANN PLAT/TP 4F 21G 40CM

## (undated) DEVICE — MINI TREK CORONARY DILATATION CATHETER 1.20 MM X 12 MM / RAPID-EXCHANGE: Brand: MINI TREK